# Patient Record
Sex: FEMALE | Race: WHITE | Employment: UNEMPLOYED | URBAN - METROPOLITAN AREA
[De-identification: names, ages, dates, MRNs, and addresses within clinical notes are randomized per-mention and may not be internally consistent; named-entity substitution may affect disease eponyms.]

---

## 2017-03-15 ENCOUNTER — APPOINTMENT (OUTPATIENT)
Dept: GENERAL RADIOLOGY | Age: 82
End: 2017-03-15
Attending: EMERGENCY MEDICINE
Payer: MEDICARE

## 2017-03-15 ENCOUNTER — HOSPITAL ENCOUNTER (EMERGENCY)
Age: 82
Discharge: HOME | End: 2017-03-15
Attending: EMERGENCY MEDICINE | Admitting: EMERGENCY MEDICINE
Payer: MEDICARE

## 2017-03-15 VITALS
OXYGEN SATURATION: 97 % | DIASTOLIC BLOOD PRESSURE: 79 MMHG | HEART RATE: 78 BPM | WEIGHT: 124 LBS | RESPIRATION RATE: 17 BRPM | SYSTOLIC BLOOD PRESSURE: 181 MMHG | TEMPERATURE: 98 F | BODY MASS INDEX: 21.28 KG/M2

## 2017-03-15 DIAGNOSIS — J20.9 ACUTE BRONCHITIS, UNSPECIFIED ORGANISM: Primary | ICD-10-CM

## 2017-03-15 LAB
ALBUMIN SERPL BCP-MCNC: 3.6 G/DL (ref 3.4–5)
ALBUMIN/GLOB SERPL: 1.1 {RATIO} (ref 0.8–1.7)
ALP SERPL-CCNC: 81 U/L (ref 45–117)
ALT SERPL-CCNC: 26 U/L (ref 13–56)
ANION GAP BLD CALC-SCNC: 9 MMOL/L (ref 3–18)
AST SERPL W P-5'-P-CCNC: 23 U/L (ref 15–37)
BASOPHILS # BLD AUTO: 0 K/UL (ref 0–0.06)
BASOPHILS # BLD: 0 % (ref 0–2)
BILIRUB SERPL-MCNC: 0.5 MG/DL (ref 0.2–1)
BNP SERPL-MCNC: 1171 PG/ML (ref 0–1800)
BUN SERPL-MCNC: 30 MG/DL (ref 7–18)
BUN/CREAT SERPL: 25 (ref 12–20)
CALCIUM SERPL-MCNC: 8.8 MG/DL (ref 8.5–10.1)
CHLORIDE SERPL-SCNC: 107 MMOL/L (ref 100–108)
CK MB CFR SERPL CALC: 1.5 % (ref 0–4)
CK MB SERPL-MCNC: 1 NG/ML (ref 5–25)
CK SERPL-CCNC: 66 U/L (ref 26–192)
CO2 SERPL-SCNC: 26 MMOL/L (ref 21–32)
CREAT SERPL-MCNC: 1.22 MG/DL (ref 0.6–1.3)
DIFFERENTIAL METHOD BLD: ABNORMAL
EOSINOPHIL # BLD: 0 K/UL (ref 0–0.4)
EOSINOPHIL NFR BLD: 0 % (ref 0–5)
ERYTHROCYTE [DISTWIDTH] IN BLOOD BY AUTOMATED COUNT: 12.3 % (ref 11.6–14.5)
GLOBULIN SER CALC-MCNC: 3.3 G/DL (ref 2–4)
GLUCOSE SERPL-MCNC: 87 MG/DL (ref 74–99)
HCT VFR BLD AUTO: 34.3 % (ref 35–45)
HGB BLD-MCNC: 11.2 G/DL (ref 12–16)
LACTATE BLD-SCNC: 0.8 MMOL/L (ref 0.4–2)
LYMPHOCYTES # BLD AUTO: 18 % (ref 21–52)
LYMPHOCYTES # BLD: 0.9 K/UL (ref 0.9–3.6)
MCH RBC QN AUTO: 30.2 PG (ref 24–34)
MCHC RBC AUTO-ENTMCNC: 32.7 G/DL (ref 31–37)
MCV RBC AUTO: 92.5 FL (ref 74–97)
MONOCYTES # BLD: 0.3 K/UL (ref 0.05–1.2)
MONOCYTES NFR BLD AUTO: 7 % (ref 3–10)
NEUTS SEG # BLD: 3.5 K/UL (ref 1.8–8)
NEUTS SEG NFR BLD AUTO: 75 % (ref 40–73)
PLATELET # BLD AUTO: 196 K/UL (ref 135–420)
PMV BLD AUTO: 9.9 FL (ref 9.2–11.8)
POTASSIUM SERPL-SCNC: 4.6 MMOL/L (ref 3.5–5.5)
PROT SERPL-MCNC: 6.9 G/DL (ref 6.4–8.2)
RBC # BLD AUTO: 3.71 M/UL (ref 4.2–5.3)
SODIUM SERPL-SCNC: 142 MMOL/L (ref 136–145)
TROPONIN I SERPL-MCNC: <0.02 NG/ML (ref 0–0.04)
WBC # BLD AUTO: 4.8 K/UL (ref 4.6–13.2)

## 2017-03-15 PROCEDURE — 85025 COMPLETE CBC W/AUTO DIFF WBC: CPT | Performed by: EMERGENCY MEDICINE

## 2017-03-15 PROCEDURE — 83605 ASSAY OF LACTIC ACID: CPT

## 2017-03-15 PROCEDURE — 83880 ASSAY OF NATRIURETIC PEPTIDE: CPT | Performed by: EMERGENCY MEDICINE

## 2017-03-15 PROCEDURE — 87040 BLOOD CULTURE FOR BACTERIA: CPT | Performed by: EMERGENCY MEDICINE

## 2017-03-15 PROCEDURE — 93005 ELECTROCARDIOGRAM TRACING: CPT

## 2017-03-15 PROCEDURE — 82550 ASSAY OF CK (CPK): CPT | Performed by: EMERGENCY MEDICINE

## 2017-03-15 PROCEDURE — 80053 COMPREHEN METABOLIC PANEL: CPT | Performed by: EMERGENCY MEDICINE

## 2017-03-15 PROCEDURE — 71010 XR CHEST PORT: CPT

## 2017-03-15 PROCEDURE — 99285 EMERGENCY DEPT VISIT HI MDM: CPT

## 2017-03-15 RX ORDER — LANOLIN ALCOHOL/MO/W.PET/CERES
CREAM (GRAM) TOPICAL
COMMUNITY
End: 2018-09-24

## 2017-03-15 RX ORDER — HYDROCODONE POLISTIREX AND CHLORPHENIRAMINE POLISTIREX 10; 8 MG/5ML; MG/5ML
2.5 SUSPENSION, EXTENDED RELEASE ORAL
Qty: 60 ML | Refills: 0 | Status: SHIPPED | OUTPATIENT
Start: 2017-03-15 | End: 2018-04-09

## 2017-03-15 NOTE — ED TRIAGE NOTES
Pt reports runny nose and drainage to back of throat that \"gives me a tickle and makes me cough\". Pt reports shortness of breath x 3 days and was seen at PCP for same. Pt arrives via EMS from [facility] due to pt being hypertensive at Urgent Care.

## 2017-03-15 NOTE — ED NOTES
I have reviewed discharge instructions and medications with the patient. The patient verbalized understanding. Patient is stable and in good condition with normal vital signs. Patient chose to discharge with their wristband; privacy risks were discussed.  Patient escorted to lobby via wheelchair

## 2017-03-15 NOTE — ED PROVIDER NOTES
HPI Comments: 3:59 PM Andrew is a 80 y.o. female with a history of HTN, CAD, DJD, and dyslipidemia, who presents to the ED c/o congestion. She also c/o rhinorrhea, chest tightness, and SOB. She was sent here via EMS from Urgent Care because she had high blood pressure. Patient denies any tobacco, alcohol, or any illicit drug use. She states that she has been taking robitussin for her sx, but found little relief. She denies any cough, wheezing, fever, syncope, nausea, or vomiting. There are no other concerns at this time. Patient is a 80 y.o. female presenting with shortness of breath and hypertension. The history is provided by the patient. Shortness of Breath   Associated symptoms include rhinorrhea. Pertinent negatives include no fever, no headaches, no sore throat, no cough, no wheezing, no chest pain, no vomiting, no abdominal pain and no rash. Hypertension    Associated symptoms include shortness of breath. Pertinent negatives include no chest pain, no palpitations, no headaches, no dizziness, no nausea and no vomiting. Past Medical History:   Diagnosis Date    Coronary artery disease     Degenerative joint disease     Degenerative spine disease     Dyslipidemia     Hypertension     Hypertension     Osteoarthritis        Past Surgical History:   Procedure Laterality Date    HX HYSTERECTOMY           History reviewed. No pertinent family history. Social History     Social History    Marital status:      Spouse name: N/A    Number of children: N/A    Years of education: N/A     Occupational History    Not on file. Social History Main Topics    Smoking status: Never Smoker    Smokeless tobacco: Not on file    Alcohol use No    Drug use: No    Sexual activity: No     Other Topics Concern    Not on file     Social History Narrative         ALLERGIES: Demerol [meperidine]; Erythromycin;  Nitrofurantoin; Pcn [penicillins]; and Sulfa (sulfonamide antibiotics)    Review of Systems   Constitutional: Negative for chills, fatigue and fever. HENT: Positive for congestion and rhinorrhea. Negative for sore throat. Respiratory: Positive for chest tightness and shortness of breath. Negative for cough and wheezing. Cardiovascular: Negative for chest pain and palpitations. Gastrointestinal: Negative for abdominal pain, diarrhea, nausea and vomiting. Genitourinary: Negative for dysuria, hematuria and urgency. Musculoskeletal: Negative for myalgias. Skin: Negative for rash and wound. Neurological: Negative for dizziness, syncope and headaches. All other systems reviewed and are negative. Vitals:    03/15/17 1537 03/15/17 1615 03/15/17 1630 03/15/17 1645   BP: 177/71 193/79 180/76 177/79   Pulse:  77 79 79   Resp:  14 12 12   Temp: 98 °F (36.7 °C)      SpO2:  96% 97% 98%   Weight: 56.2 kg (124 lb)               Physical Exam   Constitutional: She is oriented to person, place, and time. She appears well-developed and well-nourished. No distress. HENT:   Head: Normocephalic and atraumatic. Mouth/Throat: Oropharynx is clear and moist.   Eyes: Conjunctivae and EOM are normal. Pupils are equal, round, and reactive to light. No scleral icterus. Neck: Normal range of motion. Neck supple. Cardiovascular: Normal rate, regular rhythm and normal heart sounds. No murmur heard. Pulmonary/Chest: Effort normal and breath sounds normal. No respiratory distress. Abdominal: Soft. Bowel sounds are normal. She exhibits no distension. There is no tenderness. Musculoskeletal: She exhibits no edema. Lymphadenopathy:     She has no cervical adenopathy. Neurological: She is alert and oriented to person, place, and time. Coordination normal.   Skin: Skin is warm and dry. No rash noted. Psychiatric: She has a normal mood and affect. Her behavior is normal.   Nursing note and vitals reviewed.        MDM  Number of Diagnoses or Management Options  Acute bronchitis, unspecified organism:   Diagnosis management comments: C/o cough nasal congestion concerned for possible pneumonia     cxr neg in ED labs reviewed will not use abx at present pt requested something for cough will start 2.5ml tussionex        Amount and/or Complexity of Data Reviewed  Clinical lab tests: ordered and reviewed  Tests in the radiology section of CPT®: ordered and reviewed  Independent visualization of images, tracings, or specimens: yes    Risk of Complications, Morbidity, and/or Mortality  Presenting problems: high  Diagnostic procedures: moderate  Management options: moderate      ED Course       Procedures      Lab findings:  Recent Results (from the past 12 hour(s))   EKG, 12 LEAD, INITIAL    Collection Time: 03/15/17  3:41 PM   Result Value Ref Range    Ventricular Rate 81 BPM    Atrial Rate 81 BPM    P-R Interval 240 ms    QRS Duration 92 ms    Q-T Interval 370 ms    QTC Calculation (Bezet) 429 ms    Calculated P Axis 78 degrees    Calculated R Axis 44 degrees    Calculated T Axis 51 degrees    Diagnosis       Sinus rhythm with 1st degree AV block  Otherwise normal ECG  When compared with ECG of 02-AUG-2015 09:50,  ME interval has increased  T wave inversion no longer evident in Inferior leads  T wave inversion no longer evident in Anterolateral leads     CBC WITH AUTOMATED DIFF    Collection Time: 03/15/17  4:20 PM   Result Value Ref Range    WBC 4.8 4.6 - 13.2 K/uL    RBC 3.71 (L) 4.20 - 5.30 M/uL    HGB 11.2 (L) 12.0 - 16.0 g/dL    HCT 34.3 (L) 35.0 - 45.0 %    MCV 92.5 74.0 - 97.0 FL    MCH 30.2 24.0 - 34.0 PG    MCHC 32.7 31.0 - 37.0 g/dL    RDW 12.3 11.6 - 14.5 %    PLATELET 107 756 - 224 K/uL    MPV 9.9 9.2 - 11.8 FL    NEUTROPHILS 75 (H) 40 - 73 %    LYMPHOCYTES 18 (L) 21 - 52 %    MONOCYTES 7 3 - 10 %    EOSINOPHILS 0 0 - 5 %    BASOPHILS 0 0 - 2 %    ABS. NEUTROPHILS 3.5 1.8 - 8.0 K/UL    ABS. LYMPHOCYTES 0.9 0.9 - 3.6 K/UL    ABS. MONOCYTES 0.3 0.05 - 1.2 K/UL    ABS. EOSINOPHILS 0.0 0.0 - 0.4 K/UL    ABS. BASOPHILS 0.0 0.0 - 0.06 K/UL    DF AUTOMATED     METABOLIC PANEL, COMPREHENSIVE    Collection Time: 03/15/17  4:20 PM   Result Value Ref Range    Sodium 142 136 - 145 mmol/L    Potassium 4.6 3.5 - 5.5 mmol/L    Chloride 107 100 - 108 mmol/L    CO2 26 21 - 32 mmol/L    Anion gap 9 3.0 - 18 mmol/L    Glucose 87 74 - 99 mg/dL    BUN 30 (H) 7.0 - 18 MG/DL    Creatinine 1.22 0.6 - 1.3 MG/DL    BUN/Creatinine ratio 25 (H) 12 - 20      GFR est AA 50 (L) >60 ml/min/1.73m2    GFR est non-AA 41 (L) >60 ml/min/1.73m2    Calcium 8.8 8.5 - 10.1 MG/DL    Bilirubin, total 0.5 0.2 - 1.0 MG/DL    ALT (SGPT) 26 13 - 56 U/L    AST (SGOT) 23 15 - 37 U/L    Alk. phosphatase 81 45 - 117 U/L    Protein, total 6.9 6.4 - 8.2 g/dL    Albumin 3.6 3.4 - 5.0 g/dL    Globulin 3.3 2.0 - 4.0 g/dL    A-G Ratio 1.1 0.8 - 1.7     CARDIAC PANEL,(CK, CKMB & TROPONIN)    Collection Time: 03/15/17  4:20 PM   Result Value Ref Range    CK 66 26 - 192 U/L    CK - MB 1.0 <3.6 ng/ml    CK-MB Index 1.5 0.0 - 4.0 %    Troponin-I, Qt. <0.02 0.0 - 0.045 NG/ML   PRO-BNP    Collection Time: 03/15/17  4:20 PM   Result Value Ref Range    NT pro-BNP 1171 0 - 1800 PG/ML   CULTURE, BLOOD    Collection Time: 03/15/17  4:20 PM   Result Value Ref Range    Special Requests: PERIPHERAL      Culture result: PENDING    POC LACTIC ACID    Collection Time: 03/15/17  4:23 PM   Result Value Ref Range    Lactic Acid (POC) 0.8 0.4 - 2.0 mmol/L       X-Ray, CT or other radiology findings or impressions:  XR CHEST PORT   Final Result   Impression:     1.  No acute pulmonary disease         Orders  Orders Placed This Encounter    CULTURE, BLOOD     Standing Status:   Standing     Number of Occurrences:   1    CULTURE, BLOOD     Standing Status:   Standing     Number of Occurrences:   1    XR CHEST PORT     Standing Status:   Standing     Number of Occurrences:   1     Order Specific Question:   Reason for Exam     Answer:   dyspnea    CBC WITH AUTOMATED DIFF     Standing Status:   Standing     Number of Occurrences:   1    METABOLIC PANEL, COMPREHENSIVE     Standing Status:   Standing     Number of Occurrences:   1    CARDIAC PANEL,(CK, CKMB & TROPONIN)     Standing Status:   Standing     Number of Occurrences:   1    PRO-BNP     Standing Status:   Standing     Number of Occurrences:   1    POC LACTIC ACID     Standing Status:   Standing     Number of Occurrences:   1    POC LACTIC ACID     Standing Status:   Standing     Number of Occurrences:   1    EKG, 12 LEAD, INITIAL     Standing Status:   Standing     Number of Occurrences:   1     Order Specific Question:   Reason for Exam:     Answer:   dyspnea    ferrous sulfate (IRON) 325 mg (65 mg iron) tablet     Sig: Take  by mouth Daily (before breakfast).  chlorpheniramine-HYDROcodone (TUSSIONEX PENNKINETIC ER) 10-8 mg/5 mL suspension     Sig: Take 2.5 mL by mouth every twelve (12) hours as needed for Cough. Max Daily Amount: 5 mL. Dispense:  60 mL     Refill:  0         Re-evaluation:  4:57 PM I have assessed the patient, who will be discharged in stable condition. I've given the patient precautions to return to the emergency room if there are any new or worsening conditions. I've also instructed the patient to follow up regarding their visit today. Patient has verbalized understanding and agreement with treatment plan, plan to discharge, and follow-up. All questions were answered at this time. Disposition:  Diagnosis:   1.  Acute bronchitis, unspecified organism        Disposition: Discharge    Follow-up Information     Follow up With Details Comments Contact [facility] EMERGENCY DEPT  As needed, If symptoms worsen [address] [place] [address], MD Schedule an appointment as soon as possible for a visit for ED follow up appointment  [address] Liam Mendez  [facility] Internal Medicine [address] [place] [address]  [phone]             Patient's Medications   Start Taking    CHLORPHENIRAM[facility]OCODONE [facility]) 10-8 MG/5 ML SUSPENSION    Take 2.5 mL by mouth every twelve (12) hours as needed for Cough. Max Daily Amount: 5 mL. Continue Taking    ACETAMINOPHEN (TYLENOL) 325 MG TABLET    Take 2 Tabs by mouth every four (4) hours as needed. ASPIRIN DELAYED-RELEASE 81 MG TABLET    Take 1 Tab by mouth daily. ATORVASTATIN (LIPITOR) 40 MG TABLET    Take 1 Tab by mouth nightly. DICLOFENAC POTASSIUM (CATAFLAM) 50 MG TABLET    Take 50 mg by mouth two (2) times a day. ERGOCALCIFEROL (VITAMIN D2) 50,000 UNIT CAPSULE    Take 50,000 Units by mouth. FERROUS SULFATE (IRON) 325 MG (65 MG IRON) TABLET    Take  by mouth Daily (before breakfast). HYDROCODONE-ACETAMINOPHEN (NORCO) 5-325 MG PER TABLET    Take 1-2 tablets PO every 4-6 hours as needed for pain control. If over the counter ibuprofen or acetaminophen was suggested, then only take the vicodin for pain not well controlled with the over the counter medication. METOPROLOL (LOPRESSOR) 25 MG TABLET    Take 1 Tab by mouth every twelve (12) hours. Indications: HYPERTENSION    NITROGLYCERIN (NITROSTAT) 0.4 MG SL TABLET    1 Tab by SubLINGual route every five (5) minutes as needed for Chest Pain. PANTOPRAZOLE (PROTONIX) 40 MG TABLET    Take 40 mg by mouth daily. Indications: GASTROESOPHAGEAL REFLUX    POLYETHYLENE GLYCOL (MIRALAX) 17 GRAM PACKET    Take 1 Packet by mouth daily. TICAGRELOR (BRILINTA) 90 MG TABLET    Take 1 Tab by mouth two (2) times a day. TRAMADOL (ULTRAM) 50 MG TABLET    Take 50 mg by mouth every six (6) hours as needed for Pain. VALSARTAN (DIOVAN) 320 MG TABLET    Take 0.5 Tabs by mouth daily. Indications: HYPERTENSION   These Medications have changed    No medications on file   Stop Taking    No medications on file         Scribe Attestation:   Pablo Rocha acting as a scribe for and in the presence of Dr. Mireya Nolasco MD     Signed by:  Pablo Rocha, Skip, March 15, 2017 at 4:57 PM     Provider Attestation:   I personally performed the services described in the documentation, reviewed the documentation, as recorded by the scribe in my presence, and it accurately and completely records my words and actions.      Reviewed and signed by:  Dr. Elaine Munoz MD

## 2017-03-15 NOTE — DISCHARGE INSTRUCTIONS
Bronchitis: Care Instructions  Your Care Instructions    Bronchitis is inflammation of the bronchial tubes, which carry air to the lungs. The tubes swell and produce mucus, or phlegm. The mucus and inflamed bronchial tubes make you cough. You may have trouble breathing. Most cases of bronchitis are caused by viruses like those that cause colds. Antibiotics usually do not help and they may be harmful. Bronchitis usually develops rapidly and lasts about 2 to 3 weeks in otherwise healthy people. Follow-up care is a key part of your treatment and safety. Be sure to make and go to all appointments, and call your doctor if you are having problems. It's also a good idea to know your test results and keep a list of the medicines you take. How can you care for yourself at home? · Take all medicines exactly as prescribed. Call your doctor if you think you are having a problem with your medicine. · Get some extra rest.  · Take an over-the-counter pain medicine, such as acetaminophen (Tylenol), ibuprofen (Advil, Motrin), or naproxen (Aleve) to reduce fever and relieve body aches. Read and follow all instructions on the label. · Do not take two or more pain medicines at the same time unless the doctor told you to. Many pain medicines have acetaminophen, which is Tylenol. Too much acetaminophen (Tylenol) can be harmful. · Take an over-the-counter cough medicine that contains dextromethorphan to help quiet a dry, hacking cough so that you can sleep. Avoid cough medicines that have more than one active ingredient. Read and follow all instructions on the label. · Breathe moist air from a humidifier, hot shower, or sink filled with hot water. The heat and moisture will thin mucus so you can cough it out. · Do not smoke. Smoking can make bronchitis worse. If you need help quitting, talk to your doctor about stop-smoking programs and medicines. These can increase your chances of quitting for good.   When should you call for help? Call 911 anytime you think you may need emergency care. For example, call if:  · You have severe trouble breathing. Call your doctor now or seek immediate medical care if:  · You have new or worse trouble breathing. · You cough up dark brown or bloody mucus (sputum). · You have a new or higher fever. · You have a new rash. Watch closely for changes in your health, and be sure to contact your doctor if:  · You cough more deeply or more often, especially if you notice more mucus or a change in the color of your mucus. · You are not getting better as expected. Where can you learn more? Go to http://christ-antwon.info/. Enter H333 in the search box to learn more about \"Bronchitis: Care Instructions. \"  Current as of: May 23, 2016  Content Version: 11.1  © 7665-4557 [employer]. Care instructions adapted under license by [employer] (which disclaims liability or warranty for this information). If you have questions about a medical condition or this instruction, always ask your healthcare professional. [address] any warranty or liability for your use of this information.

## 2017-03-15 NOTE — ED NOTES
Pt states she is not currently feeling short of breath. States \"I think I might have the flu.  I'm a bit congested\"

## 2017-03-16 LAB
ATRIAL RATE: 81 BPM
CALCULATED P AXIS, ECG09: 78 DEGREES
CALCULATED R AXIS, ECG10: 44 DEGREES
CALCULATED T AXIS, ECG11: 51 DEGREES
DIAGNOSIS, 93000: NORMAL
P-R INTERVAL, ECG05: 240 MS
Q-T INTERVAL, ECG07: 370 MS
QRS DURATION, ECG06: 92 MS
QTC CALCULATION (BEZET), ECG08: 429 MS
VENTRICULAR RATE, ECG03: 81 BPM

## 2017-03-21 LAB
BACTERIA SPEC CULT: NORMAL
BACTERIA SPEC CULT: NORMAL
SERVICE CMNT-IMP: NORMAL
SERVICE CMNT-IMP: NORMAL

## 2017-12-18 ENCOUNTER — HOSPITAL ENCOUNTER (EMERGENCY)
Age: 82
Discharge: HOME | End: 2017-12-18
Attending: EMERGENCY MEDICINE
Payer: MEDICARE

## 2017-12-18 ENCOUNTER — APPOINTMENT (OUTPATIENT)
Dept: CT IMAGING | Age: 82
End: 2017-12-18
Attending: EMERGENCY MEDICINE
Payer: MEDICARE

## 2017-12-18 ENCOUNTER — APPOINTMENT (OUTPATIENT)
Dept: GENERAL RADIOLOGY | Age: 82
End: 2017-12-18
Attending: EMERGENCY MEDICINE
Payer: MEDICARE

## 2017-12-18 VITALS
OXYGEN SATURATION: 99 % | HEART RATE: 91 BPM | DIASTOLIC BLOOD PRESSURE: 71 MMHG | RESPIRATION RATE: 16 BRPM | TEMPERATURE: 98.6 F | SYSTOLIC BLOOD PRESSURE: 168 MMHG

## 2017-12-18 DIAGNOSIS — R07.89 ATYPICAL CHEST PAIN: Primary | ICD-10-CM

## 2017-12-18 LAB
ALBUMIN SERPL-MCNC: 3.2 G/DL (ref 3.4–5)
ALBUMIN/GLOB SERPL: 1.1 {RATIO} (ref 0.8–1.7)
ALP SERPL-CCNC: 79 U/L (ref 45–117)
ALT SERPL-CCNC: 20 U/L (ref 13–56)
ANION GAP SERPL CALC-SCNC: 9 MMOL/L (ref 3–18)
AST SERPL-CCNC: 21 U/L (ref 15–37)
BASOPHILS # BLD: 0 K/UL (ref 0–0.06)
BASOPHILS NFR BLD: 0 % (ref 0–2)
BILIRUB SERPL-MCNC: 1 MG/DL (ref 0.2–1)
BUN SERPL-MCNC: 20 MG/DL (ref 7–18)
BUN/CREAT SERPL: 21 (ref 12–20)
CALCIUM SERPL-MCNC: 8.5 MG/DL (ref 8.5–10.1)
CHLORIDE SERPL-SCNC: 105 MMOL/L (ref 100–108)
CK MB CFR SERPL CALC: NORMAL % (ref 0–4)
CK MB SERPL-MCNC: <1 NG/ML (ref 5–25)
CK SERPL-CCNC: 46 U/L (ref 26–192)
CO2 SERPL-SCNC: 27 MMOL/L (ref 21–32)
CREAT SERPL-MCNC: 0.97 MG/DL (ref 0.6–1.3)
DIFFERENTIAL METHOD BLD: ABNORMAL
EOSINOPHIL # BLD: 0 K/UL (ref 0–0.4)
EOSINOPHIL NFR BLD: 1 % (ref 0–5)
ERYTHROCYTE [DISTWIDTH] IN BLOOD BY AUTOMATED COUNT: 12.6 % (ref 11.6–14.5)
GLOBULIN SER CALC-MCNC: 3 G/DL (ref 2–4)
GLUCOSE SERPL-MCNC: 100 MG/DL (ref 74–99)
HCT VFR BLD AUTO: 31 % (ref 35–45)
HGB BLD-MCNC: 10.1 G/DL (ref 12–16)
LYMPHOCYTES # BLD: 0.7 K/UL (ref 0.9–3.6)
LYMPHOCYTES NFR BLD: 17 % (ref 21–52)
MCH RBC QN AUTO: 30.1 PG (ref 24–34)
MCHC RBC AUTO-ENTMCNC: 32.6 G/DL (ref 31–37)
MCV RBC AUTO: 92.5 FL (ref 74–97)
MONOCYTES # BLD: 0.3 K/UL (ref 0.05–1.2)
MONOCYTES NFR BLD: 7 % (ref 3–10)
NEUTS SEG # BLD: 3.2 K/UL (ref 1.8–8)
NEUTS SEG NFR BLD: 75 % (ref 40–73)
PLATELET # BLD AUTO: 176 K/UL (ref 135–420)
PMV BLD AUTO: 9.9 FL (ref 9.2–11.8)
POTASSIUM SERPL-SCNC: 4 MMOL/L (ref 3.5–5.5)
PROT SERPL-MCNC: 6.2 G/DL (ref 6.4–8.2)
RBC # BLD AUTO: 3.35 M/UL (ref 4.2–5.3)
SODIUM SERPL-SCNC: 141 MMOL/L (ref 136–145)
TROPONIN I SERPL-MCNC: 0.02 NG/ML (ref 0–0.04)
TROPONIN I SERPL-MCNC: <0.02 NG/ML (ref 0–0.04)
WBC # BLD AUTO: 4.3 K/UL (ref 4.6–13.2)

## 2017-12-18 PROCEDURE — 70450 CT HEAD/BRAIN W/O DYE: CPT

## 2017-12-18 PROCEDURE — 82550 ASSAY OF CK (CPK): CPT | Performed by: EMERGENCY MEDICINE

## 2017-12-18 PROCEDURE — 99285 EMERGENCY DEPT VISIT HI MDM: CPT

## 2017-12-18 PROCEDURE — 80053 COMPREHEN METABOLIC PANEL: CPT | Performed by: EMERGENCY MEDICINE

## 2017-12-18 PROCEDURE — 71010 XR CHEST PORT: CPT

## 2017-12-18 PROCEDURE — 85025 COMPLETE CBC W/AUTO DIFF WBC: CPT | Performed by: EMERGENCY MEDICINE

## 2017-12-18 PROCEDURE — 93005 ELECTROCARDIOGRAM TRACING: CPT

## 2017-12-18 NOTE — ED TRIAGE NOTES
Per EMS-\"Patient complains of jaw pain and face pain and states this is how she felt when she had an MI previously.  We medicated her with 324 mf baby aspirin and she took 2 SL NTG at home prior to our arrival.\"

## 2017-12-18 NOTE — DISCHARGE INSTRUCTIONS
Chest Pain: Care Instructions  Your Care Instructions    There are many things that can cause chest pain. Some are not serious and will get better on their own in a few days. But some kinds of chest pain need more testing and treatment. Your doctor may have recommended a follow-up visit in the next 8 to 12 hours. If you are not getting better, you may need more tests or treatment. Even though your doctor has released you, you still need to watch for any problems. The doctor carefully checked you, but sometimes problems can develop later. If you have new symptoms or if your symptoms do not get better, get medical care right away. If you have worse or different chest pain or pressure that lasts more than 5 minutes or you passed out (lost consciousness), call 911 or seek other emergency help right away. A medical visit is only one step in your treatment. Even if you feel better, you still need to do what your doctor recommends, such as going to all suggested follow-up appointments and taking medicines exactly as directed. This will help you recover and help prevent future problems. How can you care for yourself at home? · Rest until you feel better. · Take your medicine exactly as prescribed. Call your doctor if you think you are having a problem with your medicine. · Do not drive after taking a prescription pain medicine. When should you call for help? Call 911 if:  ? · You passed out (lost consciousness). ? · You have severe difficulty breathing. ? · You have symptoms of a heart attack. These may include:  ¨ Chest pain or pressure, or a strange feeling in your chest.  ¨ Sweating. ¨ Shortness of breath. ¨ Nausea or vomiting. ¨ Pain, pressure, or a strange feeling in your back, neck, jaw, or upper belly or in one or both shoulders or arms. ¨ Lightheadedness or sudden weakness. ¨ A fast or irregular heartbeat.   After you call 911, the  may tell you to chew 1 adult-strength or 2 to 4 low-dose aspirin. Wait for an ambulance. Do not try to drive yourself. ?Call your doctor today if:  ? · You have any trouble breathing. ? · Your chest pain gets worse. ? · You are dizzy or lightheaded, or you feel like you may faint. ? · You are not getting better as expected. ? · You are having new or different chest pain. Where can you learn more? Go to http://christ-antwon.info/. Enter A120 in the search box to learn more about \"Chest Pain: Care Instructions. \"  Current as of: March 20, 2017  Content Version: 11.4  © 4229-1401 [employer]. Care instructions adapted under license by [employer] (which disclaims liability or warranty for this information). If you have questions about a medical condition or this instruction, always ask your healthcare professional. [address] any warranty or liability for your use of this information.

## 2017-12-18 NOTE — ED PROVIDER NOTES
EMERGENCY DEPARTMENT HISTORY AND PHYSICAL EXAM    11:28 AM      Date: 12/18/2017  Patient Name: Neo Arellano    History of Presenting Illness     Chief Complaint   Patient presents with    Chest Pain         History Provided By: Patient    Chief Complaint: Facial discomfort  Duration: few Hours PTA  Timing:  Acute and Improving  Location: N/A  Quality: discomfort, tingling  Severity: N/A  Modifying Factors: N/A  Associated Symptoms: weakness, near syncope, facial discomfort, dizziness      Additional History (Context): Neo Arellano is a 80 y.o. female with hypertension and CAD who presents with  dizziness occurring few hours PTA. Associated sx include weakness, near syncope, discomfort in face. Pt took 2 NTG and 81mg ASA as her facial discomfort felt similar to prior MI circumstance. EMS adminsitered 324 mg ASA as well. Pt denies diaphoresis. Hx MI on 05/2015. Pt does not use etOH or tobacco.    PCP: Author Rachid MD    Current Outpatient Prescriptions   Medication Sig Dispense Refill    ferrous sulfate (IRON) 325 mg (65 mg iron) tablet Take  by mouth Daily (before breakfast).  chlorpheniramine-HYDROcodone (TUSSIONEX PENNKINETIC ER) 10-8 mg/5 mL suspension Take 2.5 mL by mouth every twelve (12) hours as needed for Cough. Max Daily Amount: 5 mL. 60 mL 0    traMADol (ULTRAM) 50 mg tablet Take 50 mg by mouth every six (6) hours as needed for Pain.  diclofenac potassium (CATAFLAM) 50 mg tablet Take 50 mg by mouth two (2) times a day.  HYDROcodone-acetaminophen (NORCO) 5-325 mg per tablet Take 1-2 tablets PO every 4-6 hours as needed for pain control. If over the counter ibuprofen or acetaminophen was suggested, then only take the vicodin for pain not well controlled with the over the counter medication. 6 Tab 0    metoprolol (LOPRESSOR) 25 mg tablet Take 1 Tab by mouth every twelve (12) hours. Indications: HYPERTENSION (Patient taking differently: Take 50 mg by mouth every twelve (12) hours. Indications: hypertension) 60 Tab 2    atorvastatin (LIPITOR) 40 mg tablet Take 1 Tab by mouth nightly. 30 Tab 0    ticagrelor (BRILINTA) 90 mg tablet Take 1 Tab by mouth two (2) times a day. 60 Tab 2    valsartan (DIOVAN) 320 mg tablet Take 0.5 Tabs by mouth daily. Indications: HYPERTENSION 30 Tab 0    acetaminophen (TYLENOL) 325 mg tablet Take 2 Tabs by mouth every four (4) hours as needed. 60 Tab 0    aspirin delayed-release 81 mg tablet Take 1 Tab by mouth daily. 30 Tab 0    nitroglycerin (NITROSTAT) 0.4 mg SL tablet 1 Tab by SubLINGual route every five (5) minutes as needed for Chest Pain. 30 Tab 0    polyethylene glycol (MIRALAX) 17 gram packet Take 1 Packet by mouth daily. 30 Packet 0    pantoprazole (PROTONIX) 40 mg tablet Take 40 mg by mouth daily. Indications: GASTROESOPHAGEAL REFLUX      ergocalciferol (VITAMIN D2) 50,000 unit capsule Take 50,000 Units by mouth. Past History     Past Medical History:  Past Medical History:   Diagnosis Date    Coronary artery disease     Degenerative joint disease     Degenerative spine disease     Dyslipidemia     Hypertension     Hypertension     Osteoarthritis        Past Surgical History:  Past Surgical History:   Procedure Laterality Date    HX HYSTERECTOMY         Family History:  History reviewed. No pertinent family history. Social History:  Social History   Substance Use Topics    Smoking status: Never Smoker    Smokeless tobacco: None    Alcohol use No       Allergies: Allergies   Allergen Reactions    Demerol [Meperidine] Other (comments)     Oral pain     Erythromycin Other (comments)    Nitrofurantoin Other (comments)     Mouth pain     Pcn [Penicillins] Other (comments)     Oral pain     Sulfa (Sulfonamide Antibiotics) Other (comments)     Oral pain          Review of Systems       Review of Systems   Constitutional: Positive for appetite change. Negative for diaphoresis and fever.    HENT:        Positive for facial discomfort   Neurological: Positive for dizziness, weakness and light-headedness (near syncope feeling). All other systems reviewed and are negative. Physical Exam     Visit Vitals    /73    Pulse 92    Temp 98.6 °F (37 °C)    Resp 14    SpO2 98%         Physical Exam   Constitutional: She is oriented to person, place, and time. She appears well-developed and well-nourished. No distress. HENT:   Head: Normocephalic and atraumatic. Mouth/Throat: Oropharynx is clear and moist.   Eyes: Conjunctivae and EOM are normal. Pupils are equal, round, and reactive to light. No scleral icterus. Neck: Normal range of motion. Neck supple. Cardiovascular: Normal rate, regular rhythm and normal heart sounds. No murmur heard. Pulmonary/Chest: Effort normal and breath sounds normal. No respiratory distress. Abdominal: Soft. Bowel sounds are normal. She exhibits no distension. There is no tenderness. Musculoskeletal: She exhibits no edema. Lymphadenopathy:     She has no cervical adenopathy. Neurological: She is alert and oriented to person, place, and time. Coordination normal.   Skin: Skin is warm and dry. No rash noted. Psychiatric: She has a normal mood and affect. Her behavior is normal.   Nursing note and vitals reviewed.         Diagnostic Study Results     Labs -  Recent Results (from the past 12 hour(s))   EKG, 12 LEAD, INITIAL    Collection Time: 12/18/17 11:18 AM   Result Value Ref Range    Ventricular Rate 90 BPM    Atrial Rate 90 BPM    P-R Interval 224 ms    QRS Duration 90 ms    Q-T Interval 358 ms    QTC Calculation (Bezet) 437 ms    Calculated P Axis 81 degrees    Calculated R Axis 85 degrees    Calculated T Axis -2 degrees    Diagnosis       Sinus rhythm with 1st degree AV block  Abnormal QRS-T angle, consider primary T wave abnormality  Abnormal ECG  When compared with ECG of 15-MAR-2017 15:41,  T wave inversion now evident in Inferior leads     CARDIAC PANEL,(CK, CKMB & TROPONIN)    Collection Time: 12/18/17 11:30 AM   Result Value Ref Range    CK 46 26 - 192 U/L    CK - MB <1.0 <3.6 ng/ml    CK-MB Index  0.0 - 4.0 %     CALCULATION NOT PERFORMED WHEN RESULT IS BELOW LINEAR LIMIT    Troponin-I, Qt. <0.02 0.0 - 0.045 NG/ML   CBC WITH AUTOMATED DIFF    Collection Time: 12/18/17 11:30 AM   Result Value Ref Range    WBC 4.3 (L) 4.6 - 13.2 K/uL    RBC 3.35 (L) 4.20 - 5.30 M/uL    HGB 10.1 (L) 12.0 - 16.0 g/dL    HCT 31.0 (L) 35.0 - 45.0 %    MCV 92.5 74.0 - 97.0 FL    MCH 30.1 24.0 - 34.0 PG    MCHC 32.6 31.0 - 37.0 g/dL    RDW 12.6 11.6 - 14.5 %    PLATELET 715 575 - 405 K/uL    MPV 9.9 9.2 - 11.8 FL    NEUTROPHILS 75 (H) 40 - 73 %    LYMPHOCYTES 17 (L) 21 - 52 %    MONOCYTES 7 3 - 10 %    EOSINOPHILS 1 0 - 5 %    BASOPHILS 0 0 - 2 %    ABS. NEUTROPHILS 3.2 1.8 - 8.0 K/UL    ABS. LYMPHOCYTES 0.7 (L) 0.9 - 3.6 K/UL    ABS. MONOCYTES 0.3 0.05 - 1.2 K/UL    ABS. EOSINOPHILS 0.0 0.0 - 0.4 K/UL    ABS. BASOPHILS 0.0 0.0 - 0.06 K/UL    DF AUTOMATED     METABOLIC PANEL, COMPREHENSIVE    Collection Time: 12/18/17 11:30 AM   Result Value Ref Range    Sodium 141 136 - 145 mmol/L    Potassium 4.0 3.5 - 5.5 mmol/L    Chloride 105 100 - 108 mmol/L    CO2 27 21 - 32 mmol/L    Anion gap 9 3.0 - 18 mmol/L    Glucose 100 (H) 74 - 99 mg/dL    BUN 20 (H) 7.0 - 18 MG/DL    Creatinine 0.97 0.6 - 1.3 MG/DL    BUN/Creatinine ratio 21 (H) 12 - 20      GFR est AA >60 >60 ml/min/1.73m2    GFR est non-AA 53 (L) >60 ml/min/1.73m2    Calcium 8.5 8.5 - 10.1 MG/DL    Bilirubin, total 1.0 0.2 - 1.0 MG/DL    ALT (SGPT) 20 13 - 56 U/L    AST (SGOT) 21 15 - 37 U/L    Alk.  phosphatase 79 45 - 117 U/L    Protein, total 6.2 (L) 6.4 - 8.2 g/dL    Albumin 3.2 (L) 3.4 - 5.0 g/dL    Globulin 3.0 2.0 - 4.0 g/dL    A-G Ratio 1.1 0.8 - 1.7     TROPONIN I    Collection Time: 12/18/17  3:45 PM   Result Value Ref Range    Troponin-I, Qt. 0.02 0.0 - 0.045 NG/ML       Radiologic Studies -   XR CHEST PORT   Final Result   As read by RAD:    Impression:  No acute radiographic cardiopulmonary abnormality. Mild, chronic appearing lower  lung interstitial opacities without substantial change from prior exams     CT HEAD WO CONT   Final Result   As read by RAD:    IMPRESSION:     1. No acute intracranial abnormalities are identified. No CT evidence to  suggest acute intracranial hematoma, cortical infarct, or mass effect/mass  lesion. CT is known to be relatively insensitive to acute ischemia in the first  24-48 hours and MRI may be useful if clinically indicated. 2.  Mild cerebral atrophy/volume loss and periventricular white matter changes,  most commonly seen with chronic microvascular disease           Medical Decision Making   I am the first provider for this patient. I reviewed the vital signs, available nursing notes, past medical history, past surgical history, family history and social history. Vital Signs-Reviewed the patient's vital signs. Pulse Oximetry Analysis -  99% on room air (Interpretation) normal    Cardiac Monitor:  Rate: 89    EKG: Interpreted by the EP. Time Interpreted: 1120   Rate: 89   Rhythm: Normal Sinus Rhythm 1st degree block    Interpretation: nonspecific changes   Comparison: h/o similar in past     Records Reviewed: Nursing Notes (Time of Review: 11:28 AM)    ED Course: Progress Notes, Reevaluation, and Consults:      11:20 Consult:  Discussed care with Dr. Blade Hodges (Primary Care). Standard discussion; including history of patients chief complaint, available diagnostic results, and treatment course. Agrees with 2 sets cardiac enzymes, DC if neg. Provider Notes (Medical Decision Making): atypical presentation for chest pain with face discomfort similar to previous symptoms in past with MI early this am now resolved 2 sets of neg cardiac enzymes         Diagnosis     Clinical Impression:   1.  Atypical chest pain        Disposition: home     Follow-up Information     Follow up With Details Comments [facility] EMERGENCY DEPT  As needed, If symptoms worsen [address] [place] [address] [address]    Vicente Lee MD Schedule an appointment as soon as possible for a visit for ED follow up appointment  [facility] [address] [facility] [address] [phone]             Patient's Medications   Start Taking    No medications on file   Continue Taking    ACETAMINOPHEN (TYLENOL) 325 MG TABLET    Take 2 Tabs by mouth every four (4) hours as needed. ASPIRIN DELAYED-RELEASE 81 MG TABLET    Take 1 Tab by mouth daily. ATORVASTATIN (LIPITOR) 40 MG TABLET    Take 1 Tab by mouth nightly. CHLORPHENIRAMINE-HYDROCODONE (TUSSIONEX PENNKINETIC ER) 10-8 MG/5 ML SUSPENSION    Take 2.5 mL by mouth every twelve (12) hours as needed for Cough. Max Daily Amount: 5 mL. DICLOFENAC POTASSIUM (CATAFLAM) 50 MG TABLET    Take 50 mg by mouth two (2) times a day. ERGOCALCIFEROL (VITAMIN D2) 50,000 UNIT CAPSULE    Take 50,000 Units by mouth. FERROUS SULFATE (IRON) 325 MG (65 MG IRON) TABLET    Take  by mouth Daily (before breakfast). HYDROCODONE-ACETAMINOPHEN (NORCO) 5-325 MG PER TABLET    Take 1-2 tablets PO every 4-6 hours as needed for pain control. If over the counter ibuprofen or acetaminophen was suggested, then only take the vicodin for pain not well controlled with the over the counter medication. METOPROLOL (LOPRESSOR) 25 MG TABLET    Take 1 Tab by mouth every twelve (12) hours. Indications: HYPERTENSION    NITROGLYCERIN (NITROSTAT) 0.4 MG SL TABLET    1 Tab by SubLINGual route every five (5) minutes as needed for Chest Pain. PANTOPRAZOLE (PROTONIX) 40 MG TABLET    Take 40 mg by mouth daily. Indications: GASTROESOPHAGEAL REFLUX    POLYETHYLENE GLYCOL (MIRALAX) 17 GRAM PACKET    Take 1 Packet by mouth daily. TICAGRELOR (BRILINTA) 90 MG TABLET    Take 1 Tab by mouth two (2) times a day.     TRAMADOL (ULTRAM) 50 MG TABLET    Take 50 mg by mouth every six (6) hours as needed for Pain. VALSARTAN (DIOVAN) 320 MG TABLET    Take 0.5 Tabs by mouth daily. Indications: HYPERTENSION   These Medications have changed    No medications on file   Stop Taking    No medications on file     _______________________________    Attestations:  [address] [place] [address] acting as a scribe for and in the presence of Addie Arias MD      December 18, 2017 at 4:32 PM       Provider Attestation:      I personally performed the services described in the documentation, reviewed the documentation, as recorded by the scribe in my presence, and it accurately and completely records my words and actions.  December 18, 2017 at 4:32 PM - Addie Arias MD    _______________________________

## 2017-12-19 LAB
ATRIAL RATE: 89 BPM
CALCULATED P AXIS, ECG09: 73 DEGREES
CALCULATED R AXIS, ECG10: 83 DEGREES
CALCULATED T AXIS, ECG11: 0 DEGREES
DIAGNOSIS, 93000: NORMAL
P-R INTERVAL, ECG05: 220 MS
Q-T INTERVAL, ECG07: 354 MS
QRS DURATION, ECG06: 90 MS
QTC CALCULATION (BEZET), ECG08: 430 MS
VENTRICULAR RATE, ECG03: 89 BPM

## 2018-04-09 ENCOUNTER — HOSPITAL ENCOUNTER (EMERGENCY)
Age: 83
Discharge: HOME | End: 2018-04-09
Attending: EMERGENCY MEDICINE
Payer: MEDICARE

## 2018-04-09 ENCOUNTER — APPOINTMENT (OUTPATIENT)
Dept: CT IMAGING | Age: 83
End: 2018-04-09
Attending: EMERGENCY MEDICINE
Payer: MEDICARE

## 2018-04-09 VITALS
RESPIRATION RATE: 22 BRPM | TEMPERATURE: 98.1 F | HEIGHT: 62 IN | SYSTOLIC BLOOD PRESSURE: 224 MMHG | WEIGHT: 125 LBS | HEART RATE: 85 BPM | DIASTOLIC BLOOD PRESSURE: 80 MMHG | BODY MASS INDEX: 23 KG/M2 | OXYGEN SATURATION: 97 %

## 2018-04-09 DIAGNOSIS — N20.0 RIGHT NEPHROLITHIASIS: ICD-10-CM

## 2018-04-09 DIAGNOSIS — R10.9 ACUTE RIGHT FLANK PAIN: Primary | ICD-10-CM

## 2018-04-09 LAB
ALBUMIN SERPL-MCNC: 3.7 G/DL (ref 3.4–5)
ALBUMIN/GLOB SERPL: 1.1 {RATIO} (ref 0.8–1.7)
ALP SERPL-CCNC: 91 U/L (ref 45–117)
ALT SERPL-CCNC: 22 U/L (ref 13–56)
ANION GAP SERPL CALC-SCNC: 6 MMOL/L (ref 3–18)
APPEARANCE UR: CLEAR
AST SERPL-CCNC: 23 U/L (ref 15–37)
BACTERIA URNS QL MICRO: NEGATIVE /HPF
BASOPHILS # BLD: 0 K/UL (ref 0–0.06)
BASOPHILS NFR BLD: 1 % (ref 0–2)
BILIRUB SERPL-MCNC: 0.6 MG/DL (ref 0.2–1)
BILIRUB UR QL: NEGATIVE
BUN SERPL-MCNC: 27 MG/DL (ref 7–18)
BUN/CREAT SERPL: 23 (ref 12–20)
CALCIUM SERPL-MCNC: 8.7 MG/DL (ref 8.5–10.1)
CHLORIDE SERPL-SCNC: 107 MMOL/L (ref 100–108)
CO2 SERPL-SCNC: 28 MMOL/L (ref 21–32)
COLOR UR: YELLOW
CREAT SERPL-MCNC: 1.16 MG/DL (ref 0.6–1.3)
DIFFERENTIAL METHOD BLD: ABNORMAL
EOSINOPHIL # BLD: 0.1 K/UL (ref 0–0.4)
EOSINOPHIL NFR BLD: 2 % (ref 0–5)
EPITH CASTS URNS QL MICRO: NORMAL /LPF (ref 0–5)
ERYTHROCYTE [DISTWIDTH] IN BLOOD BY AUTOMATED COUNT: 12.5 % (ref 11.6–14.5)
GLOBULIN SER CALC-MCNC: 3.3 G/DL (ref 2–4)
GLUCOSE SERPL-MCNC: 91 MG/DL (ref 74–99)
GLUCOSE UR STRIP.AUTO-MCNC: NEGATIVE MG/DL
HCT VFR BLD AUTO: 33 % (ref 35–45)
HGB BLD-MCNC: 10.8 G/DL (ref 12–16)
HGB UR QL STRIP: NEGATIVE
KETONES UR QL STRIP.AUTO: NEGATIVE MG/DL
LEUKOCYTE ESTERASE UR QL STRIP.AUTO: ABNORMAL
LYMPHOCYTES # BLD: 1.2 K/UL (ref 0.9–3.6)
LYMPHOCYTES NFR BLD: 20 % (ref 21–52)
MCH RBC QN AUTO: 29.9 PG (ref 24–34)
MCHC RBC AUTO-ENTMCNC: 32.7 G/DL (ref 31–37)
MCV RBC AUTO: 91.4 FL (ref 74–97)
MONOCYTES # BLD: 0.5 K/UL (ref 0.05–1.2)
MONOCYTES NFR BLD: 9 % (ref 3–10)
NEUTS SEG # BLD: 4.1 K/UL (ref 1.8–8)
NEUTS SEG NFR BLD: 68 % (ref 40–73)
NITRITE UR QL STRIP.AUTO: NEGATIVE
PH UR STRIP: 5 [PH] (ref 5–8)
PLATELET # BLD AUTO: 206 K/UL (ref 135–420)
PMV BLD AUTO: 9.7 FL (ref 9.2–11.8)
POTASSIUM SERPL-SCNC: 4.4 MMOL/L (ref 3.5–5.5)
PROT SERPL-MCNC: 7 G/DL (ref 6.4–8.2)
PROT UR STRIP-MCNC: NEGATIVE MG/DL
RBC # BLD AUTO: 3.61 M/UL (ref 4.2–5.3)
RBC #/AREA URNS HPF: 0 /HPF (ref 0–5)
SODIUM SERPL-SCNC: 141 MMOL/L (ref 136–145)
SP GR UR REFRACTOMETRY: 1.01 (ref 1–1.03)
UROBILINOGEN UR QL STRIP.AUTO: 0.2 EU/DL (ref 0.2–1)
WBC # BLD AUTO: 6 K/UL (ref 4.6–13.2)
WBC URNS QL MICRO: NORMAL /HPF (ref 0–4)

## 2018-04-09 PROCEDURE — 74011250637 HC RX REV CODE- 250/637: Performed by: EMERGENCY MEDICINE

## 2018-04-09 PROCEDURE — 74011250636 HC RX REV CODE- 250/636: Performed by: EMERGENCY MEDICINE

## 2018-04-09 PROCEDURE — 74011250636 HC RX REV CODE- 250/636

## 2018-04-09 PROCEDURE — 85025 COMPLETE CBC W/AUTO DIFF WBC: CPT | Performed by: PHYSICIAN ASSISTANT

## 2018-04-09 PROCEDURE — 74177 CT ABD & PELVIS W/CONTRAST: CPT

## 2018-04-09 PROCEDURE — 96374 THER/PROPH/DIAG INJ IV PUSH: CPT

## 2018-04-09 PROCEDURE — 96375 TX/PRO/DX INJ NEW DRUG ADDON: CPT

## 2018-04-09 PROCEDURE — 81001 URINALYSIS AUTO W/SCOPE: CPT | Performed by: PHYSICIAN ASSISTANT

## 2018-04-09 PROCEDURE — 80053 COMPREHEN METABOLIC PANEL: CPT | Performed by: PHYSICIAN ASSISTANT

## 2018-04-09 PROCEDURE — 99285 EMERGENCY DEPT VISIT HI MDM: CPT

## 2018-04-09 PROCEDURE — 96361 HYDRATE IV INFUSION ADD-ON: CPT

## 2018-04-09 PROCEDURE — 74011636320 HC RX REV CODE- 636/320: Performed by: EMERGENCY MEDICINE

## 2018-04-09 RX ORDER — TRAMADOL HYDROCHLORIDE 50 MG/1
50 TABLET ORAL
Qty: 12 TAB | Refills: 0 | Status: SHIPPED | OUTPATIENT
Start: 2018-04-09 | End: 2019-04-11

## 2018-04-09 RX ORDER — TRAMADOL HYDROCHLORIDE 50 MG/1
50 TABLET ORAL
Status: COMPLETED | OUTPATIENT
Start: 2018-04-09 | End: 2018-04-09

## 2018-04-09 RX ORDER — MORPHINE SULFATE 4 MG/ML
INJECTION INTRAVENOUS
Status: COMPLETED
Start: 2018-04-09 | End: 2018-04-09

## 2018-04-09 RX ORDER — MORPHINE SULFATE 4 MG/ML
2 INJECTION, SOLUTION INTRAMUSCULAR; INTRAVENOUS
Status: COMPLETED | OUTPATIENT
Start: 2018-04-09 | End: 2018-04-09

## 2018-04-09 RX ORDER — ONDANSETRON 2 MG/ML
4 INJECTION INTRAMUSCULAR; INTRAVENOUS
Status: COMPLETED | OUTPATIENT
Start: 2018-04-09 | End: 2018-04-09

## 2018-04-09 RX ORDER — ACETAMINOPHEN 325 MG/1
975 TABLET ORAL
Status: COMPLETED | OUTPATIENT
Start: 2018-04-09 | End: 2018-04-09

## 2018-04-09 RX ORDER — ACETAMINOPHEN 500 MG
1000 TABLET ORAL
Qty: 50 TAB | Refills: 0 | Status: SHIPPED | OUTPATIENT
Start: 2018-04-09

## 2018-04-09 RX ADMIN — MORPHINE SULFATE 2 MG: 4 INJECTION, SOLUTION INTRAMUSCULAR; INTRAVENOUS at 19:26

## 2018-04-09 RX ADMIN — ONDANSETRON 4 MG: 2 INJECTION INTRAMUSCULAR; INTRAVENOUS at 19:25

## 2018-04-09 RX ADMIN — IOPAMIDOL 80 ML: 612 INJECTION, SOLUTION INTRAVENOUS at 20:02

## 2018-04-09 RX ADMIN — SODIUM CHLORIDE 500 ML: 900 INJECTION, SOLUTION INTRAVENOUS at 19:29

## 2018-04-09 RX ADMIN — ACETAMINOPHEN 975 MG: 325 TABLET, FILM COATED ORAL at 21:09

## 2018-04-09 RX ADMIN — MORPHINE SULFATE 2 MG: 4 INJECTION INTRAVENOUS at 19:26

## 2018-04-09 RX ADMIN — TRAMADOL HYDROCHLORIDE 50 MG: 50 TABLET, FILM COATED ORAL at 21:09

## 2018-04-09 NOTE — ED TRIAGE NOTES
Pt c/o back,right flank, and left knee pain 2/85 with certain movement. Per pt, it started last week. Pt took Tylenol and did not help.

## 2018-04-10 NOTE — ED PROVIDER NOTES
HPI Comments: Angeline Cano is a 80 y.o. Female with c/o right sided back pain for last week getting worse. Worse with movement, bending. Better with rest. Some relief with tylenol . No fcs, nvd, urinary sx, cough, sob. No previous evaluation for pain. Constant spasm like pain      The history is provided by the patient. Past Medical History:   Diagnosis Date    Coronary artery disease     Degenerative joint disease     Degenerative spine disease     Dyslipidemia     Hypertension     Hypertension     Osteoarthritis        Past Surgical History:   Procedure Laterality Date    HX APPENDECTOMY      HX HYSTERECTOMY           History reviewed. No pertinent family history. Social History     Social History    Marital status:      Spouse name: N/A    Number of children: N/A    Years of education: N/A     Occupational History    Not on file. Social History Main Topics    Smoking status: Never Smoker    Smokeless tobacco: Never Used    Alcohol use No    Drug use: No    Sexual activity: No     Other Topics Concern    Not on file     Social History Narrative         ALLERGIES: Demerol [meperidine]; Erythromycin; Nitrofurantoin; Pcn [penicillins]; and Sulfa (sulfonamide antibiotics)    Review of Systems   Constitutional: Negative for appetite change and fever. HENT: Negative for sore throat and trouble swallowing. Eyes: Negative for visual disturbance. Respiratory: Negative for cough and shortness of breath. Cardiovascular: Negative for chest pain. Gastrointestinal: Positive for abdominal pain. Genitourinary: Positive for flank pain. Negative for difficulty urinating and hematuria. Musculoskeletal: Positive for back pain. Skin: Negative for rash. Allergic/Immunologic: Negative for immunocompromised state. Neurological: Negative for syncope. Psychiatric/Behavioral: Positive for sleep disturbance.        Vitals:    04/09/18 1815 04/09/18 1830 04/09/18 1845 04/09/18 1926   BP: (!) 230/96 (!) 139/110 176/84    Pulse:    84   Resp:    21   Temp:       SpO2:  99% 99% 97%   Weight:       Height:                Physical Exam   Constitutional: She is oriented to person, place, and time. She appears well-developed and well-nourished. She appears distressed (elderly appearing in mild distress over pain). HENT:   Head: Normocephalic and atraumatic. Right Ear: External ear normal.   Left Ear: External ear normal.   Nose: Nose normal.   Mouth/Throat: Uvula is midline, oropharynx is clear and moist and mucous membranes are normal.   Eyes: Conjunctivae are normal. No scleral icterus. Neck: Neck supple. Cardiovascular: Normal rate, regular rhythm, normal heart sounds and intact distal pulses. Pulmonary/Chest: Effort normal and breath sounds normal.   Abdominal: Soft. Normal appearance. She exhibits no distension, no pulsatile midline mass and no mass. There is no hepatosplenomegaly. There is tenderness. There is CVA tenderness. There is no rigidity, no rebound, no guarding, no tenderness at McBurney's point and negative Jarrell's sign. Musculoskeletal: She exhibits no edema. Neurological: She is alert and oriented to person, place, and time. Gait normal.   Skin: Skin is warm and dry. She is not diaphoretic. Psychiatric: Her behavior is normal.   Nursing note and vitals reviewed.        [facility]      ED Course       Procedures    Vitals:  Patient Vitals for the past 12 hrs:   Temp Pulse Resp BP SpO2   04/09/18 1926 - 84 21 - 97 %   04/09/18 1845 - - - 176/84 99 %   04/09/18 1830 - - - (!) 139/110 99 %   04/09/18 1815 - - - (!) 230/96 -   04/09/18 1801 - 77 15 - 98 %   04/09/18 1740 98.1 °F (36.7 °C) 77 20 (!) 201/99 99 %         Medications ordered:   Medications   morphine injection 2 mg (2 mg IntraVENous Given 4/9/18 1926)   ondansetron (ZOFRAN) injection 4 mg (4 mg IntraVENous Given 4/9/18 1925)   sodium chloride 0.9 % bolus infusion 500 mL (500 mL IntraVENous New Bag 4/9/18 1929)   iopamidol (ISOVUE 300) 61 % contrast injection 100-200 mL (80 mL IntraVENous Given 4/9/18 2002)   acetaminophen (TYLENOL) tablet 975 mg (975 mg Oral Given 4/9/18 2109)   traMADol (ULTRAM) tablet 50 mg (50 mg Oral Given 4/9/18 2109)         Lab findings:  Recent Results (from the past 12 hour(s))   URINALYSIS W/ RFLX MICROSCOPIC    Collection Time: 04/09/18  6:08 PM   Result Value Ref Range    Color YELLOW      Appearance CLEAR      Specific gravity 1.014 1.005 - 1.030      pH (UA) 5.0 5.0 - 8.0      Protein NEGATIVE  NEG mg/dL    Glucose NEGATIVE  NEG mg/dL    Ketone NEGATIVE  NEG mg/dL    Bilirubin NEGATIVE  NEG      Blood NEGATIVE  NEG      Urobilinogen 0.2 0.2 - 1.0 EU/dL    Nitrites NEGATIVE  NEG      Leukocyte Esterase TRACE (A) NEG     URINE MICROSCOPIC ONLY    Collection Time: 04/09/18  6:08 PM   Result Value Ref Range    WBC 0 to 3 0 - 4 /hpf    RBC 0 0 - 5 /hpf    Epithelial cells FEW 0 - 5 /lpf    Bacteria NEGATIVE  NEG /hpf   CBC WITH AUTOMATED DIFF    Collection Time: 04/09/18  6:24 PM   Result Value Ref Range    WBC 6.0 4.6 - 13.2 K/uL    RBC 3.61 (L) 4.20 - 5.30 M/uL    HGB 10.8 (L) 12.0 - 16.0 g/dL    HCT 33.0 (L) 35.0 - 45.0 %    MCV 91.4 74.0 - 97.0 FL    MCH 29.9 24.0 - 34.0 PG    MCHC 32.7 31.0 - 37.0 g/dL    RDW 12.5 11.6 - 14.5 %    PLATELET 368 744 - 643 K/uL    MPV 9.7 9.2 - 11.8 FL    NEUTROPHILS 68 40 - 73 %    LYMPHOCYTES 20 (L) 21 - 52 %    MONOCYTES 9 3 - 10 %    EOSINOPHILS 2 0 - 5 %    BASOPHILS 1 0 - 2 %    ABS. NEUTROPHILS 4.1 1.8 - 8.0 K/UL    ABS. LYMPHOCYTES 1.2 0.9 - 3.6 K/UL    ABS. MONOCYTES 0.5 0.05 - 1.2 K/UL    ABS. EOSINOPHILS 0.1 0.0 - 0.4 K/UL    ABS.  BASOPHILS 0.0 0.0 - 0.06 K/UL    DF AUTOMATED     METABOLIC PANEL, COMPREHENSIVE    Collection Time: 04/09/18  6:24 PM   Result Value Ref Range    Sodium 141 136 - 145 mmol/L    Potassium 4.4 3.5 - 5.5 mmol/L    Chloride 107 100 - 108 mmol/L    CO2 28 21 - 32 mmol/L    Anion gap 6 3.0 - 18 mmol/L    Glucose 91 74 - 99 mg/dL    BUN 27 (H) 7.0 - 18 MG/DL    Creatinine 1.16 0.6 - 1.3 MG/DL    BUN/Creatinine ratio 23 (H) 12 - 20      GFR est AA 53 (L) >60 ml/min/1.73m2    GFR est non-AA 43 (L) >60 ml/min/1.73m2    Calcium 8.7 8.5 - 10.1 MG/DL    Bilirubin, total 0.6 0.2 - 1.0 MG/DL    ALT (SGPT) 22 13 - 56 U/L    AST (SGOT) 23 15 - 37 U/L    Alk. phosphatase 91 45 - 117 U/L    Protein, total 7.0 6.4 - 8.2 g/dL    Albumin 3.7 3.4 - 5.0 g/dL    Globulin 3.3 2.0 - 4.0 g/dL    A-G Ratio 1.1 0.8 - 1.7         EKG interpretation by ED Physician:      X-Ray, CT or other radiology findings or impressions:  CT ABD PELV W CONT    (Results Pending)   nonobstructing right renal calculus. Possible 5mm right ureteral stone,   Mod rectal stool      Progress notes, Consult notes or additional Procedure notes:   No obvious acute infection, obstruction. Poss related to stone, muscular. No rash noted on exam  Doubt need for other work up here  I have discussed with patient and/or family/sig other the results, interpretation of any imaging if performed, suspected diagnosis and treatment plan to include instructions regarding the diagnoses listed to which understanding was expressed with all questions answered        Reevaluation of patient:   stable    Disposition:  Diagnosis:   1. Acute right flank pain    2.  Right nephrolithiasis        Disposition: home    Follow-up Information     Follow up With Details Comments [facility] MD Grace Schedule an appointment as soon as possible for a visit  [address] [facility] [address] [place] [address] Ta[place]      Shagufta Ramsey MD Schedule an appointment as soon as possible for a visit about your kidney stone [facility] EMERGENCY DEPT  If symptoms worsen [address]  [phone]            Patient's Medications   Start Taking    ACETAMINOPHEN (TYLENOL EXTRA STRENGTH) 500 MG TABLET Take 2 Tabs by mouth every six (6) hours as needed for Pain. Continue Taking    ASPIRIN DELAYED-RELEASE 81 MG TABLET    Take 1 Tab by mouth daily. ATORVASTATIN (LIPITOR) 40 MG TABLET    Take 1 Tab by mouth nightly. DICLOFENAC POTASSIUM (CATAFLAM) 50 MG TABLET    Take 50 mg by mouth two (2) times a day. ERGOCALCIFEROL (VITAMIN D2) 50,000 UNIT CAPSULE    Take 50,000 Units by mouth. FERROUS SULFATE (IRON) 325 MG (65 MG IRON) TABLET    Take  by mouth Daily (before breakfast). METOPROLOL (LOPRESSOR) 25 MG TABLET    Take 1 Tab by mouth every twelve (12) hours. Indications: HYPERTENSION    NITROGLYCERIN (NITROSTAT) 0.4 MG SL TABLET    1 Tab by SubLINGual route every five (5) minutes as needed for Chest Pain. PANTOPRAZOLE (PROTONIX) 40 MG TABLET    Take 40 mg by mouth daily. Indications: GASTROESOPHAGEAL REFLUX    POLYETHYLENE GLYCOL (MIRALAX) 17 GRAM PACKET    Take 1 Packet by mouth daily. TICAGRELOR (BRILINTA) 90 MG TABLET    Take 1 Tab by mouth two (2) times a day. VALSARTAN (DIOVAN) 320 MG TABLET    Take 0.5 Tabs by mouth daily. Indications: HYPERTENSION   These Medications have changed    Modified Medication Previous Medication    TRAMADOL (ULTRAM) 50 MG TABLET traMADol (ULTRAM) 50 mg tablet       Take 1 Tab by mouth every eight (8) hours as needed for Pain. Max Daily Amount: 150 mg. Take 50 mg by mouth every six (6) hours as needed for Pain. Stop Taking    ACETAMINOPHEN (TYLENOL) 325 MG TABLET    Take 2 Tabs by mouth every four (4) hours as needed. CHLORPHENIRAMINE-HYDROCODONE (TUSSIONEX PENNKINETIC ER) 10-8 MG/5 ML SUSPENSION    Take 2.5 mL by mouth every twelve (12) hours as needed for Cough. Max Daily Amount: 5 mL. HYDROCODONE-ACETAMINOPHEN (NORCO) 5-325 MG PER TABLET    Take 1-2 tablets PO every 4-6 hours as needed for pain control.   If over the counter ibuprofen or acetaminophen was suggested, then only take the vicodin for pain not well controlled with the over the counter medication.

## 2018-04-12 PROBLEM — N20.0 BILATERAL NEPHROLITHIASIS: Status: ACTIVE | Noted: 2018-04-12

## 2018-04-12 PROBLEM — M54.50 RIGHT LOW BACK PAIN: Status: ACTIVE | Noted: 2018-04-12

## 2018-05-15 PROBLEM — N28.1 RENAL CYST, LEFT: Status: ACTIVE | Noted: 2018-05-15

## 2018-09-20 ENCOUNTER — APPOINTMENT (OUTPATIENT)
Dept: GENERAL RADIOLOGY | Age: 83
DRG: 310 | End: 2018-09-20
Attending: EMERGENCY MEDICINE
Payer: MEDICARE

## 2018-09-20 ENCOUNTER — APPOINTMENT (OUTPATIENT)
Dept: NON INVASIVE DIAGNOSTICS | Age: 83
DRG: 310 | End: 2018-09-20
Attending: INTERNAL MEDICINE
Payer: MEDICARE

## 2018-09-20 ENCOUNTER — HOSPITAL ENCOUNTER (INPATIENT)
Age: 83
LOS: 4 days | Discharge: NURSING, REHAB OR LONG-TERM CARE | DRG: 310 | End: 2018-09-24
Attending: EMERGENCY MEDICINE | Admitting: INTERNAL MEDICINE
Payer: MEDICARE

## 2018-09-20 DIAGNOSIS — I48.91 NEW ONSET ATRIAL FIBRILLATION (HCC): Primary | ICD-10-CM

## 2018-09-20 DIAGNOSIS — R03.0 ELEVATED BLOOD PRESSURE READING: ICD-10-CM

## 2018-09-20 LAB
ANION GAP SERPL CALC-SCNC: 8 MMOL/L (ref 3–18)
APTT PPP: 26.2 SEC (ref 23–36.4)
BASOPHILS # BLD: 0 K/UL (ref 0–0.1)
BASOPHILS NFR BLD: 0 % (ref 0–2)
BUN SERPL-MCNC: 26 MG/DL (ref 7–18)
BUN/CREAT SERPL: 22 (ref 12–20)
CALCIUM SERPL-MCNC: 8.9 MG/DL (ref 8.5–10.1)
CHLORIDE SERPL-SCNC: 106 MMOL/L (ref 100–108)
CK MB CFR SERPL CALC: 2.5 % (ref 0–4)
CK MB SERPL-MCNC: 2.2 NG/ML (ref 5–25)
CK SERPL-CCNC: 87 U/L (ref 26–192)
CO2 SERPL-SCNC: 27 MMOL/L (ref 21–32)
CREAT SERPL-MCNC: 1.19 MG/DL (ref 0.6–1.3)
DIFFERENTIAL METHOD BLD: ABNORMAL
EOSINOPHIL # BLD: 0.1 K/UL (ref 0–0.4)
EOSINOPHIL NFR BLD: 1 % (ref 0–5)
ERYTHROCYTE [DISTWIDTH] IN BLOOD BY AUTOMATED COUNT: 12.7 % (ref 11.6–14.5)
GLUCOSE SERPL-MCNC: 104 MG/DL (ref 74–99)
HCT VFR BLD AUTO: 32.5 % (ref 35–45)
HGB BLD-MCNC: 10.6 G/DL (ref 12–16)
INR PPP: 1 (ref 0.8–1.2)
LYMPHOCYTES # BLD: 1.1 K/UL (ref 0.9–3.6)
LYMPHOCYTES NFR BLD: 21 % (ref 21–52)
MCH RBC QN AUTO: 30.2 PG (ref 24–34)
MCHC RBC AUTO-ENTMCNC: 32.6 G/DL (ref 31–37)
MCV RBC AUTO: 92.6 FL (ref 74–97)
MONOCYTES # BLD: 0.3 K/UL (ref 0.05–1.2)
MONOCYTES NFR BLD: 6 % (ref 3–10)
NEUTS SEG # BLD: 3.5 K/UL (ref 1.8–8)
NEUTS SEG NFR BLD: 72 % (ref 40–73)
PLATELET # BLD AUTO: 192 K/UL (ref 135–420)
PMV BLD AUTO: 9.8 FL (ref 9.2–11.8)
POTASSIUM SERPL-SCNC: 4.3 MMOL/L (ref 3.5–5.5)
PROTHROMBIN TIME: 13.2 SEC (ref 11.5–15.2)
RBC # BLD AUTO: 3.51 M/UL (ref 4.2–5.3)
SODIUM SERPL-SCNC: 141 MMOL/L (ref 136–145)
TROPONIN I SERPL-MCNC: 0.1 NG/ML (ref 0–0.04)
TROPONIN I SERPL-MCNC: <0.02 NG/ML (ref 0–0.04)
TSH SERPL DL<=0.05 MIU/L-ACNC: 0.39 UIU/ML (ref 0.36–3.74)
WBC # BLD AUTO: 5 K/UL (ref 4.6–13.2)

## 2018-09-20 PROCEDURE — 99285 EMERGENCY DEPT VISIT HI MDM: CPT

## 2018-09-20 PROCEDURE — 96361 HYDRATE IV INFUSION ADD-ON: CPT

## 2018-09-20 PROCEDURE — 74011000250 HC RX REV CODE- 250: Performed by: EMERGENCY MEDICINE

## 2018-09-20 PROCEDURE — 71045 X-RAY EXAM CHEST 1 VIEW: CPT

## 2018-09-20 PROCEDURE — 80048 BASIC METABOLIC PNL TOTAL CA: CPT | Performed by: EMERGENCY MEDICINE

## 2018-09-20 PROCEDURE — 65660000000 HC RM CCU STEPDOWN

## 2018-09-20 PROCEDURE — 74011250636 HC RX REV CODE- 250/636: Performed by: INTERNAL MEDICINE

## 2018-09-20 PROCEDURE — 93005 ELECTROCARDIOGRAM TRACING: CPT

## 2018-09-20 PROCEDURE — 96374 THER/PROPH/DIAG INJ IV PUSH: CPT

## 2018-09-20 PROCEDURE — 85730 THROMBOPLASTIN TIME PARTIAL: CPT | Performed by: EMERGENCY MEDICINE

## 2018-09-20 PROCEDURE — 82550 ASSAY OF CK (CPK): CPT | Performed by: INTERNAL MEDICINE

## 2018-09-20 PROCEDURE — 85610 PROTHROMBIN TIME: CPT | Performed by: EMERGENCY MEDICINE

## 2018-09-20 PROCEDURE — 84443 ASSAY THYROID STIM HORMONE: CPT | Performed by: INTERNAL MEDICINE

## 2018-09-20 PROCEDURE — 36415 COLL VENOUS BLD VENIPUNCTURE: CPT | Performed by: INTERNAL MEDICINE

## 2018-09-20 PROCEDURE — 74011250637 HC RX REV CODE- 250/637: Performed by: INTERNAL MEDICINE

## 2018-09-20 PROCEDURE — 93308 TTE F-UP OR LMTD: CPT

## 2018-09-20 PROCEDURE — 85025 COMPLETE CBC W/AUTO DIFF WBC: CPT | Performed by: EMERGENCY MEDICINE

## 2018-09-20 PROCEDURE — 74011250636 HC RX REV CODE- 250/636: Performed by: EMERGENCY MEDICINE

## 2018-09-20 PROCEDURE — 84484 ASSAY OF TROPONIN QUANT: CPT | Performed by: EMERGENCY MEDICINE

## 2018-09-20 RX ORDER — POLYETHYLENE GLYCOL 3350 17 G/17G
17 POWDER, FOR SOLUTION ORAL DAILY
Status: DISCONTINUED | OUTPATIENT
Start: 2018-09-21 | End: 2018-09-24 | Stop reason: HOSPADM

## 2018-09-20 RX ORDER — PANTOPRAZOLE SODIUM 40 MG/1
40 TABLET, DELAYED RELEASE ORAL
Status: DISCONTINUED | OUTPATIENT
Start: 2018-09-21 | End: 2018-09-24 | Stop reason: HOSPADM

## 2018-09-20 RX ORDER — GUAIFENESIN 100 MG/5ML
324 LIQUID (ML) ORAL
Status: DISCONTINUED | OUTPATIENT
Start: 2018-09-20 | End: 2018-09-20

## 2018-09-20 RX ORDER — DILTIAZEM HYDROCHLORIDE 60 MG/1
60 TABLET, FILM COATED ORAL
Status: COMPLETED | OUTPATIENT
Start: 2018-09-20 | End: 2018-09-21

## 2018-09-20 RX ORDER — METOPROLOL TARTRATE 25 MG/1
25 TABLET, FILM COATED ORAL EVERY 12 HOURS
Status: DISCONTINUED | OUTPATIENT
Start: 2018-09-20 | End: 2018-09-24 | Stop reason: HOSPADM

## 2018-09-20 RX ORDER — ATORVASTATIN CALCIUM 40 MG/1
40 TABLET, FILM COATED ORAL
Status: DISCONTINUED | OUTPATIENT
Start: 2018-09-20 | End: 2018-09-24 | Stop reason: HOSPADM

## 2018-09-20 RX ORDER — HYDRALAZINE HYDROCHLORIDE 20 MG/ML
10 INJECTION INTRAMUSCULAR; INTRAVENOUS
Status: DISCONTINUED | OUTPATIENT
Start: 2018-09-20 | End: 2018-09-24 | Stop reason: HOSPADM

## 2018-09-20 RX ORDER — DILTIAZEM HYDROCHLORIDE 5 MG/ML
10 INJECTION INTRAVENOUS
Status: COMPLETED | OUTPATIENT
Start: 2018-09-20 | End: 2018-09-20

## 2018-09-20 RX ADMIN — DILTIAZEM HYDROCHLORIDE 10 MG: 5 INJECTION INTRAVENOUS at 11:19

## 2018-09-20 RX ADMIN — HYDRALAZINE HYDROCHLORIDE 10 MG: 20 INJECTION INTRAMUSCULAR; INTRAVENOUS at 18:37

## 2018-09-20 RX ADMIN — ATORVASTATIN CALCIUM 40 MG: 40 TABLET, FILM COATED ORAL at 21:20

## 2018-09-20 RX ADMIN — SODIUM CHLORIDE 1000 ML: 900 INJECTION, SOLUTION INTRAVENOUS at 11:18

## 2018-09-20 RX ADMIN — METOPROLOL TARTRATE 25 MG: 25 TABLET ORAL at 20:20

## 2018-09-20 RX ADMIN — DILTIAZEM HYDROCHLORIDE 60 MG: 60 TABLET, FILM COATED ORAL at 17:40

## 2018-09-20 RX ADMIN — METOPROLOL TARTRATE 25 MG: 25 TABLET ORAL at 14:51

## 2018-09-20 RX ADMIN — APIXABAN 2.5 MG: 2.5 TABLET, FILM COATED ORAL at 17:40

## 2018-09-20 NOTE — ED TRIAGE NOTES
Alert female arrives by EMS george feeling weak and dizzy. Pt denies chest pain. States took Nitro x 2 and ASA because she felt like she had high blood pressure.   Pt hr in the 160s upon arrival to ED

## 2018-09-20 NOTE — ED NOTES
TRANSFER - ED to INPATIENT REPORT: 
 
SBAR report made available to receiving floor on this patient being transferred to [address] [place] [address] ([facility])  for routine progression of care Admitting diagnosis New onset atrial fibrillation ([facility] [address] Information from the following report(s) SBAR and ED Summary was made available to receiving floor. Lines:  
Peripheral IV 09/20/18 Left Antecubital (Active) Site Assessment Clean, dry, & intact 9/20/2018 11:18 AM  
Phlebitis Assessment 0 9/20/2018 11:18 AM  
Infiltration Assessment 0 9/20/2018 11:18 AM  
Dressing Status Clean, dry, & intact 9/20/2018 11:18 AM  
Dressing Type Transparent 9/20/2018 11:18 AM  
Hub Color/Line Status Green;Flushed 9/20/2018 11:18 AM  
Action Taken Blood drawn 9/20/2018 11:18 AM  
  
 
Medication list unable to confirm Opportunity for questions and clarification was provided. Patient is oriented to time, place, person and situation Patient is  continent and ambulatory with assist 
  
Valuables transported with patient Patient transported with: 
 Monitor Registered Nurse

## 2018-09-20 NOTE — ROUTINE PROCESS
1345 - patient to [phone] [id number] via stretcher from ER - denies any pain or SOB at this time. Oriented to unit and call system - 
 
1430 - placed on tele # 45 - AFib per tele tech. Patient eating - admission completed 1630 - echo at bedside 1730 - sitting up for dinner - no complaints - PM meds given 1830 - PRN hydralazine administered for BP > 170. 
 
1900 - repeat /64

## 2018-09-20 NOTE — IP AVS SNAPSHOT
[address] [address] 
[phone] Patient: [facility] ERNESTINE MRN: [id number] About your hospitalization You were admitted on:  2018 You last received care in the:  [address] [place] [address] You were discharged on:  2018 Why you were hospitalized Your primary diagnosis was:  Not on File Your diagnoses also included:  New Onset Atrial Fibrillation (Hcc), Htn (Hypertension), Cad (Coronary Artery Disease), Hypercholesteremia, Gerd (Gastroesophageal Reflux Disease), Stomatitis And Mucositis Follow-up Information Follow up With Details Comments Contact Info MD Ozzie Emanuel recent hospitalization [address] [facility] [address] [facility] [place] [id number] [phone] Discharge Orders None A check john indicates which time of day the medication should be taken. My Medications START taking these medications Instructions Each Dose to Equal  
 Morning Noon Evening Bedtime  
 apixaban 2.5 mg tablet Commonly known as:  All Rick Your last dose was: Your next dose is: Take 1 Tab by mouth two (2) times a day. 2.5 mg  
    
   
   
   
  
 dilTIAZem  mg ER capsule Commonly known as:  CARDIZEM CD Your last dose was: Your next dose is: Take 1 Cap by mouth daily. 180 mg CHANGE how you take these medications Instructions Each Dose to Equal  
 Morning Noon Evening Bedtime  
 metoprolol tartrate 25 mg tablet Commonly known as:  LOPRESSOR What changed:  how much to take Your last dose was: Your next dose is: Take 1 Tab by mouth every twelve (12) hours. Indications: HYPERTENSION  
 25 mg CONTINUE taking these medications Instructions Each Dose to Equal  
 Morning Noon Evening Bedtime acetaminophen 500 mg tablet Commonly known as:  [address] [facility] Your last dose was: Your next dose is: Take 2 Tabs by mouth every six (6) hours as needed for Pain. 1000 mg  
    
   
   
   
  
 atorvastatin 40 mg tablet Commonly known as:  LIPITOR Your last dose was: Your next dose is: Take 1 Tab by mouth nightly. 40 mg  
    
   
   
   
  
 nitroglycerin 0.4 mg SL tablet Commonly known as:  NITROSTAT Your last dose was: Your next dose is:    
   
   
 1 Tab by SubLINGual route every five (5) minutes as needed for Chest Pain. 0.4 mg  
    
   
   
   
  
 polyethylene glycol 17 gram packet Commonly known as:  Ronne Shahzad Your last dose was: Your next dose is: Take 1 Packet by mouth daily. 17 g PROTONIX 40 mg tablet Generic drug:  pantoprazole Your last dose was: Your next dose is: Take 40 mg by mouth daily. Indications: GASTROESOPHAGEAL REFLUX  
 40 mg  
    
   
   
   
  
 traMADol 50 mg tablet Commonly known as:  ULTRAM  
   
Your last dose was: Your next dose is: Take 1 Tab by mouth every eight (8) hours as needed for Pain. Max Daily Amount: 150 mg.  
 50 mg  
    
   
   
   
  
 VITAMIN D2 50,000 unit capsule Generic drug:  ergocalciferol Your last dose was: Your next dose is: Take 50,000 Units by mouth. [address]15 Units STOP taking these medications   
 aspirin delayed-release 81 mg tablet  
   
  
 diclofenac potassium 50 mg tablet Commonly known as:  CATAFLAM  
   
  
 Iron 325 mg (65 mg iron) tablet Generic drug:  ferrous sulfate  
   
  
 ticagrelor 90 mg tablet Commonly known as:  BRILINTA  
   
  
 valsartan 320 mg tablet Commonly known as:  DIOVAN Where to Get Your Medications Information on where to get these meds will be given to you by the nurse or doctor. ! Ask your nurse or doctor about these medications  
  apixaban 2.5 mg tablet  
 dilTIAZem  mg ER capsule Opioid Education Prescription Opioids: What You Need to Know: 
 
 
 
F-face looks uneven A-arms unable to move or move unevenly S-speech slurred or non-existent T-time-call 911 as soon as signs and symptoms begin-DO NOT go Back to bed or wait to see if you get better-TIME IS BRAIN. Warning Signs of HEART ATTACK Call 911 if you have these symptoms: 
? Chest discomfort. Most heart attacks involve discomfort in the center of the chest that lasts more than a few minutes, or that goes away and comes back. It can feel like uncomfortable pressure, squeezing, fullness, or pain. ? Discomfort in other areas of the upper body. Symptoms can include pain or discomfort in one or both arms, the back, neck, jaw, or stomach. ? Shortness of breath with or without chest discomfort. ? Other signs may include breaking out in a cold sweat, nausea, or lightheadedness. Don't wait more than five minutes to call [address]! Fast action can save your life. Calling 911 is almost always the fastest way to get lifesaving treatment.  Emergency Medical Services staff can begin treatment when they arrive  up to an hour sooner than if someone gets to the hospital by car. The discharge information has been reviewed with the patient. The patient verbalized understanding. Discharge medications reviewed with the patient and appropriate educational materials and side effects teaching were provided. Patient armband removed and shredded Atrial Fibrillation: Care Instructions Your Care Instructions Atrial fibrillation is an irregular and often fast heartbeat. Treating this condition is important for several reasons. It can cause blood clots, which can travel from your heart to your brain and cause a stroke. If you have a fast heartbeat, you may feel lightheaded, dizzy, and weak. An irregular heartbeat can also increase your risk for heart failure. Atrial fibrillation is often the result of another heart condition, such as high blood pressure or coronary artery disease. Making changes to improve your heart condition will help you stay healthy and active. Follow-up care is a key part of your treatment and safety. Be sure to make and go to all appointments, and call your doctor if you are having problems. It's also a good idea to know your test results and keep a list of the medicines you take. How can you care for yourself at home? Medicines 
  · Take your medicines exactly as prescribed. Call your doctor if you think you are having a problem with your medicine. You will get more details on the specific medicines your doctor prescribes.  
  · If your doctor has given you a blood thinner to prevent a stroke, be sure you get instructions about how to take your medicine safely. Blood thinners can cause serious bleeding problems.  
  · Do not take any vitamins, over-the-counter drugs, or herbal products without talking to your doctor first.  
 Lifestyle changes 
  · Do not smoke.  Smoking can increase your chance of a stroke and heart attack. If you need help quitting, talk to your doctor about stop-smoking programs and medicines. These can increase your chances of quitting for good.  
  · Eat a heart-healthy diet.  
  · Stay at a healthy weight. Lose weight if you need to.  
  · Limit alcohol to 2 drinks a day for men and 1 drink a day for women. Too much alcohol can cause health problems.  
  · Avoid colds and flu. Get a pneumococcal vaccine shot. If you have had one before, ask your doctor whether you need another dose. Get a flu shot every year. If you must be around people with colds or flu, wash your hands often. Activity 
  · If your doctor recommends it, get more exercise. Walking is a good choice. Bit by bit, increase the amount you walk every day. Try for at least 30 minutes on most days of the week. You also may want to swim, bike, or do other activities. Your doctor may suggest that you join a cardiac rehabilitation program so that you can have help increasing your physical activity safely.  
  · Start light exercise if your doctor says it is okay. Even a small amount will help you get stronger, have more energy, and manage stress. Walking is an easy way to get exercise. Start out by walking a little more than you did in the hospital. Gradually increase the amount you walk.  
  · When you exercise, watch for signs that your heart is working too hard. You are pushing too hard if you cannot talk while you are exercising. If you become short of breath or dizzy or have chest pain, sit down and rest immediately.  
  · Check your pulse regularly. Place two fingers on the artery at the palm side of your wrist, in line with your thumb. If your heartbeat seems uneven or fast, talk to your doctor. When should you call for help? Call 911 anytime you think you may need emergency care. For example, call if: 
  · You have symptoms of a heart attack. These may include: ¨ Chest pain or pressure, or a strange feeling in the chest. 
¨ Sweating. ¨ Shortness of breath. ¨ Nausea or vomiting. ¨ Pain, pressure, or a strange feeling in the back, neck, jaw, or upper belly or in one or both shoulders or arms. ¨ Lightheadedness or sudden weakness. ¨ A fast or irregular heartbeat. After you call 911, the  may tell you to chew 1 adult-strength or 2 to 4 low-dose aspirin. Wait for an ambulance. Do not try to drive yourself.  
  · You have symptoms of a stroke. These may include: 
¨ Sudden numbness, tingling, weakness, or loss of movement in your face, arm, or leg, especially on only one side of your body. ¨ Sudden vision changes. ¨ Sudden trouble speaking. ¨ Sudden confusion or trouble understanding simple statements. ¨ Sudden problems with walking or balance. ¨ A sudden, severe headache that is different from past headaches.  
  · You passed out (lost consciousness).  
 Call your doctor now or seek immediate medical care if: 
  · You have new or increased shortness of breath.  
  · You feel dizzy or lightheaded, or you feel like you may faint.  
  · Your heart rate becomes irregular.  
  · You can feel your heart flutter in your chest or skip heartbeats. Tell your doctor if these symptoms are new or worse.  
 Watch closely for changes in your health, and be sure to contact your doctor if you have any problems. Where can you learn more? Go to http://christ-antwon.info/. Enter U020 in the search box to learn more about \"Atrial Fibrillation: Care Instructions. \" Current as of: December 6, 2017 Content Version: 11.7 © 9117-1759 [employer]. Care instructions adapted under license by [employer] (which disclaims liability or warranty for this information). If you have questions about a medical condition or this instruction, always ask your healthcare professional. [place] [address] any warranty or liability for your use of this information. High Blood Pressure: Care Instructions Your Care Instructions If your blood pressure is usually above 130/80, you have high blood pressure, or hypertension. That means the top number is 130 or higher or the bottom number is 80 or higher, or both. Despite what a lot of people think, high blood pressure usually doesn't cause headaches or make you feel dizzy or lightheaded. It usually has no symptoms. But it does increase your risk for heart attack, stroke, and kidney or eye damage. The higher your blood pressure, the more your risk increases. Your doctor will give you a goal for your blood pressure. Your goal will be based on your health and your age. Lifestyle changes, such as eating healthy and being active, are always important to help lower blood pressure. You might also take medicine to reach your blood pressure goal. 
Follow-up care is a key part of your treatment and safety. Be sure to make and go to all appointments, and call your doctor if you are having problems. It's also a good idea to know your test results and keep a list of the medicines you take. How can you care for yourself at home? Medical treatment · If you stop taking your medicine, your blood pressure will go back up. You may take one or more types of medicine to lower your blood pressure. Be safe with medicines. Take your medicine exactly as prescribed. Call your doctor if you think you are having a problem with your medicine. · Talk to your doctor before you start taking aspirin every day. Aspirin can help certain people lower their risk of a heart attack or stroke. But taking aspirin isn't right for everyone, because it can cause serious bleeding. · See your doctor regularly. You may need to see the doctor more often at first or until your blood pressure comes down. · If you are taking blood pressure medicine, talk to your doctor before you take decongestants or anti-inflammatory medicine, such as ibuprofen. Some of these medicines can raise blood pressure. · Learn how to check your blood pressure at home. Lifestyle changes · Stay at a healthy weight. This is especially important if you put on weight around the waist. Losing even 10 pounds can help you lower your blood pressure. · If your doctor recommends it, get more exercise. Walking is a good choice. Bit by bit, increase the amount you walk every day. Try for at least 30 minutes on most days of the week. You also may want to swim, bike, or do other activities. · Avoid or limit alcohol. Talk to your doctor about whether you can drink any alcohol. · Try to limit how much sodium you eat to less than 2,300 milligrams (mg) a day. Your doctor may ask you to try to eat less than 1,500 mg a day. · Eat plenty of fruits (such as bananas and oranges), vegetables, legumes, whole grains, and low-fat dairy products. · Lower the amount of saturated fat in your diet. Saturated fat is found in animal products such as milk, cheese, and meat. Limiting these foods may help you lose weight and also lower your risk for heart disease. · Do not smoke. Smoking increases your risk for heart attack and stroke. If you need help quitting, talk to your doctor about stop-smoking programs and medicines. These can increase your chances of quitting for good. When should you call for help? Call 911 anytime you think you may need emergency care. This may mean having symptoms that suggest that your blood pressure is causing a serious heart or blood vessel problem. Your blood pressure may be over 180/110. 
 For example, call 911 if: 
  · You have symptoms of a heart attack. These may include: ¨ Chest pain or pressure, or a strange feeling in the chest. 
¨ Sweating. ¨ Shortness of breath. ¨ Nausea or vomiting. ¨ Pain, pressure, or a strange feeling in the back, neck, jaw, or upper belly or in one or both shoulders or arms. ¨ Lightheadedness or sudden weakness. ¨ A fast or irregular heartbeat.   · You have symptoms of a stroke. These may include: 
¨ Sudden numbness, tingling, weakness, or loss of movement in your face, arm, or leg, especially on only one side of your body. ¨ Sudden vision changes. ¨ Sudden trouble speaking. ¨ Sudden confusion or trouble understanding simple statements. ¨ Sudden problems with walking or balance. ¨ A sudden, severe headache that is different from past headaches.  
  · You have severe back or belly pain.  
 Do not wait until your blood pressure comes down on its own. Get help right away. 
 Call your doctor now or seek immediate care if: 
  · Your blood pressure is much higher than normal (such as 180/110 or higher), but you don't have symptoms.  
  · You think high blood pressure is causing symptoms, such as: ¨ Severe headache. ¨ Blurry vision.  
 Watch closely for changes in your health, and be sure to contact your doctor if: 
  · Your blood pressure measures 140/90 or higher at least 2 times. That means the top number is 140 or higher or the bottom number is 90 or higher, or both.  
  · You think you may be having side effects from your blood pressure medicine.  
  · Your blood pressure is usually normal, but it goes above normal at least 2 times. Where can you learn more? Go to http://christ-antwon.info/. Enter U747 in the search box to learn more about \"High Blood Pressure: Care Instructions. \" Current as of: December 6, 2017 Content Version: 11.7 © 6696-6987 [employer]. Care instructions adapted under license by [employer] (which disclaims liability or warranty for this information). If you have questions about a medical condition or this instruction, always ask your healthcare professional. [address] any warranty or liability for your use of this information.  
 
___________________________________________________________________________ ________________________________________________________ Aliveshoes Announcement We are excited to announce that we are making your provider's discharge notes available to you in Aliveshoes. You will see these notes when they are completed and signed by the physician that discharged you from your recent hospital stay. If you have any questions or concerns about any information you see in Aliveshoes, please call the Health Information Department where you were seen or reach out to your Primary Care Provider for more information about your plan of care. Introducing [facility]! Serafin Chino introduces Aliveshoes patient portal. Now you can access parts of your medical record, email your doctor's office, and request medication refills online. 1. In your internet browser, go to [email or web address] 2. Click on the First Time User? Click Here link in the Sign In box. You will see the New Member Sign Up page. 3. Enter your Aliveshoes Access Code exactly as it appears below. You will not need to use this code after youve completed the sign-up process. If you do not sign up before the expiration date, you must request a new code. · Aliveshoes Access Code: [email or web address] Expires: 12/19/2018 11:00 AM 
 
4. Enter the last four digits of your Social Security Number (xxxx) and Date of Birth (mm/dd/yyyy) as indicated and click Submit. You will be taken to the next sign-up page. 5. Create a Aliveshoes ID. This will be your Aliveshoes login ID and cannot be changed, so think of one that is secure and easy to remember. 6. Create a Aliveshoes password. You can change your password at any time. 7. Enter your Password Reset Question and Answer. This can be used at a later time if you forget your password. 8. Enter your e-mail address. You will receive e-mail notification when new information is available in [address] 9. Click Sign Up. You can now view and download portions of your medical record. 10. Click the Download Summary menu link to download a portable copy of your medical information. If you have questions, please visit the Frequently Asked Questions section of the ybuyt website. Remember, [employer] is NOT to be used for urgent needs. For medical emergencies, dial 911. Now available from your iPhone and Android! Introducing Manuel Abdul As [facility], I wanted to make you aware of our electronic visit tool called Manuel Abdul. [employer] allows you to connect within minutes with a medical provider 24 hours a day, seven days a week via a mobile device or tablet or logging into a secure website from your computer. You can access Manuel Abdul from anywhere in the [place]. A virtual visit might be right for you when you have a simple condition and feel like you just dont want to get out of bed, or cant get away from work for an appointment, when your regular [place] provider is not available (evenings, weekends or holidays), or when youre out of town and need minor care. Electronic visits cost only $49 and if the [employer] provider determines a prescription is needed to treat your condition, one can be electronically transmitted to a nearby pharmacy*. Please take a moment to enroll today if you have not already done so. The enrollment process is free and takes just a few minutes. To enroll, please download the [employer] warren to your tablet or phone, or visit [email or web address] to enroll on your computer. And, as an [address] [place] [address] patient with a [employer] account, the results of your visits will be scanned into your electronic medical record and your primary care provider will be able to view the scanned results. We urge you to continue to see your regular [facility] provider for your ongoing medical care.   And while your primary care provider may not be the one available when you seek a Manuel Sappmiguelfin virtual visit, the peace of mind you get from getting a real diagnosis real time can be priceless. For more information on Manuel Diazfin, view our Frequently Asked Questions (FAQs) at [email or web address]. Sincerely, 
 
Corinne Parra MD 
Chief Medical Officer Gloria Ho *:  certain medications cannot be prescribed via Manuel Sekougarret Providers Seen During Your Hospitalization Provider Specialty Primary office phone Vida Benito MD Emergency Medicine [phone] Ling Head MD Geriatric Medicine [phone] Your Primary Care Physician (PCP) Primary Care Physician Office Phone Office Fax Josefina Harmon [phone] You are allergic to the following Allergen Reactions Demerol (Meperidine) Other (comments) Oral pain Erythromycin Other (comments) Nitrofurantoin Other (comments) Mouth pain   
    
 Pcn (Penicillins) Other (comments) Oral pain   
    
 Sulfa (Sulfonamide Antibiotics) Other (comments) Oral pain Recent Documentation Height Weight Breastfeeding? BMI OB Status Smoking Status 1.626 m 58.4 kg No 22.1 kg/m2 Hysterectomy Never Smoker Emergency Contacts Name Discharge Info Relation Home Work Mobile Ag Snyder DISCHARGE CAREGIVER [3] Child [2] [phone] Patient Belongings The following personal items are in your possession at time of discharge: 
  Dental Appliances: None  Visual Aid: Glasses      Home Medications: None   Jewelry: Ring (gold band L ring finger)  Clothing: Slippers, Undergarments    Other Valuables: Purse Please provide this summary of care documentation to your next provider. Signatures-by signing, you are acknowledging that this After Visit Summary has been reviewed with you and you have received a copy.   
  
 
  
    
    
 Patient Signature: ____________________________________________________________ Date:  ____________________________________________________________  
  
Josph Perfect Provider Signature:  ____________________________________________________________ Date:  ____________________________________________________________

## 2018-09-20 NOTE — PROGRESS NOTES
Chart reviewed and patient verified demographics. She has [place] and [place],  for life and also [employer] qualified Long Term Insurances. I put copy of the long term insurance on front of chart and I will email registration. She sees Dr Virgilio Palmer and Dr Tg Mcleod as PCP's. She uses a cane or walker to ambulate. 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 She lives alone and is having some issues with being alone. She has someone to cut the grass, some one who she pays to come in sometimes to clean her house. She has a disabled neighbor that picks up groceries. She has no family in the area. She is able to talk and give information well, she seems able to make decisions. Her son Bhavna Weiss is her only surviviing child, two other sons passed away. His phone number is listed as [phone], but the mailbox is full. I looked in previous admission and have a # [phone] which son's wife, Bharathi Vásquez answered. Capri Barrera is a  and is not able to get or receive calls a lot. He is patient's POA, she has the document at home. I see no advance directive. Ms Chacha Marcial will let  know, and I gave her  Aleja's number to call tomorrow. She says she would only consider going to [facility] for rehab, no other facilities. I posted in [facility]. She says she would really like to get long term personal care aide to come in and help her. She said they had sent her information on that, but states she did nothing else with the info at the time. Her [address] is still valid and could give her the help she needs for home . The phone number for son or patient to call is [phone], then option #1, then option # 5, then option #3. That is what the insurance gave me ad direct number to get them where they can talk to company.  I had used the number on the card -[phone] but that is not direct. Her policy # is [id number]. It can provide nursing homes, assisted living facilities, hospice, or home health or personal care. They have steps in place to get personal care aides. The patient or family will need to arrange it. I have placed a FYI asking MD to order PT/OT while in hospital. To go to [facility], she will need 3 over nights and two skills. Reason for Admission:   A fib RRAT Score:     20 Resources/supports as identified by patient/family:  Son out of state Top Challenges facing patient (as identified by patient/family and CM): Finances/Medication cost?       
           
Transportation? She will have no transportation unless disabled neighbor can drive her home. Support system or lack thereof? Lacks this. But she has long term care insurance Living arrangements? Lives alone. Self-care/ADLs/Cognition? Very able Current Advanced Directive/Advance Care Plan:  no 
                       
Plan for utilizing home health:    snf vs home health PT/OT, would like skilled nurse for disease management and medication education. Likelihood of readmission:  Patient is 80years old and has issues, co morbiditys Transition of Care Plan:      snf vs home with home health

## 2018-09-20 NOTE — PROGRESS NOTES
completed the initial Spiritual Assessment of the patient in bed 5 of the emergency room  and offered Pastoral Care support. No family seen at time of this visit. . Patient does not have any Restorationism/cultural needs that will affect patients preferences in health care. Chaplains will continue to follow and will provide pastoral care on an as needed/requested basis. Andreia Machado Board Certified [facility] Care Department [phone]

## 2018-09-20 NOTE — IP AVS SNAPSHOT
[address] [address] 
[phone] Patient: [facility] ERNESTINE MRN: [id number] OKI:[birth year] A check john indicates which time of day the medication should be taken. My Medications START taking these medications Instructions Each Dose to Equal  
 Morning Noon Evening Bedtime  
 apixaban 2.5 mg tablet Commonly known as:  Marcia Paz Your last dose was: Your next dose is: Take 1 Tab by mouth two (2) times a day. 2.5 mg  
    
   
   
   
  
 dilTIAZem  mg ER capsule Commonly known as:  CARDIZEM CD Your last dose was: Your next dose is: Take 1 Cap by mouth daily. 180 mg CHANGE how you take these medications Instructions Each Dose to Equal  
 Morning Noon Evening Bedtime  
 metoprolol tartrate 25 mg tablet Commonly known as:  LOPRESSOR What changed:  how much to take Your last dose was: Your next dose is: Take 1 Tab by mouth every twelve (12) hours. Indications: HYPERTENSION  
 25 mg CONTINUE taking these medications Instructions Each Dose to Equal  
 Morning Noon Evening Bedtime  
 acetaminophen 500 mg tablet Commonly known as:  [address] [facility] Your last dose was: Your next dose is: Take 2 Tabs by mouth every six (6) hours as needed for Pain. 1000 mg  
    
   
   
   
  
 atorvastatin 40 mg tablet Commonly known as:  LIPITOR Your last dose was: Your next dose is: Take 1 Tab by mouth nightly. 40 mg  
    
   
   
   
  
 nitroglycerin 0.4 mg SL tablet Commonly known as:  NITROSTAT Your last dose was: Your next dose is:    
   
   
 1 Tab by SubLINGual route every five (5) minutes as needed for Chest Pain. 0.4 mg  
    
   
   
   
  
 polyethylene glycol 17 gram packet Commonly known as:  Keily Gannon Your last dose was: Your next dose is: Take 1 Packet by mouth daily. 17 g PROTONIX 40 mg tablet Generic drug:  pantoprazole Your last dose was: Your next dose is: Take 40 mg by mouth daily. Indications: GASTROESOPHAGEAL REFLUX  
 40 mg  
    
   
   
   
  
 traMADol 50 mg tablet Commonly known as:  ULTRAM  
   
Your last dose was: Your next dose is: Take 1 Tab by mouth every eight (8) hours as needed for Pain. Max Daily Amount: 150 mg.  
 50 mg  
    
   
   
   
  
 VITAMIN D2 50,000 unit capsule Generic drug:  ergocalciferol Your last dose was: Your next dose is: Take 50,000 Units by mouth. 95522 Units STOP taking these medications   
 aspirin delayed-release 81 mg tablet  
   
  
 diclofenac potassium 50 mg tablet Commonly known as:  CATAFLAM  
   
  
 Iron 325 mg (65 mg iron) tablet Generic drug:  ferrous sulfate  
   
  
 ticagrelor 90 mg tablet Commonly known as:  BRILINTA  
   
  
 valsartan 320 mg tablet Commonly known as:  DIOVAN Where to Get Your Medications Information on where to get these meds will be given to you by the nurse or doctor. ! Ask your nurse or doctor about these medications  
  apixaban 2.5 mg tablet  
 dilTIAZem  mg ER capsule

## 2018-09-20 NOTE — ED PROVIDER NOTES
EMERGENCY DEPARTMENT HISTORY AND PHYSICAL EXAM 
 
11:01 AM 
 
 
Date: 9/20/2018 Patient Name: [facility] ERNESTINE History of Presenting Illness Chief Complaint Patient presents with  Dizziness History Provided By: Patient Chief Complaint: Chest palpitations Duration:  Hours Timing:  Constant Location: chest 
Quality: \"faster than normal\" Severity: Moderate Modifying Factors: No modifying or aggravating factors were reported. Associated Symptoms: weakness Additional History (Context): 11:01 AM [facility] ERNESTINE is a 80 y.o. female with h/o HTN, dyslipidemia, CAD who presents to ED complaining of moderate constant chest palpitations with onset a few hours pta. Pt states she has not been feeling well over the past week. Associated symptoms include weakness. Denies abd pain, diarrhea, fever, cough. Reports she does take aspirin daily. Denies having a cardiology. Pt does live alone. She did apply 2 nitro this morning because her face \"felt funny. \" No modifying or aggravating factors were reported. No other concerns or symptoms at this time. PCP: Amira Staples MD 
 
 
 
Current Facility-Administered Medications Medication Dose Route Frequency Provider Last Rate Last Dose  aspirin chewable tablet 324 mg  324 mg Oral NOW Kelsie Albarran MD      
 
Current Outpatient Prescriptions Medication Sig Dispense Refill  traMADol (ULTRAM) 50 mg tablet Take 1 Tab by mouth every eight (8) hours as needed for Pain. Max Daily Amount: 150 mg. 12 Tab 0  
 acetaminophen (TYLENOL EXTRA STRENGTH) 500 mg tablet Take 2 Tabs by mouth every six (6) hours as needed for Pain. 50 Tab 0  
 ferrous sulfate (IRON) 325 mg (65 mg iron) tablet Take  by mouth Daily (before breakfast).  diclofenac potassium (CATAFLAM) 50 mg tablet Take 50 mg by mouth two (2) times a day.     
 metoprolol (LOPRESSOR) 25 mg tablet Take 1 Tab by mouth every twelve (12) hours. Indications: HYPERTENSION (Patient taking differently: Take 50 mg by mouth every twelve (12) hours. Indications: hypertension) 60 Tab 2  
 atorvastatin (LIPITOR) 40 mg tablet Take 1 Tab by mouth nightly. 30 Tab 0  
 ticagrelor (BRILINTA) 90 mg tablet Take 1 Tab by mouth two (2) times a day. 60 Tab 2  
 valsartan (DIOVAN) 320 mg tablet Take 0.5 Tabs by mouth daily. Indications: HYPERTENSION 30 Tab 0  
 aspirin delayed-release 81 mg tablet Take 1 Tab by mouth daily. 30 Tab 0  
 nitroglycerin (NITROSTAT) 0.4 mg SL tablet 1 Tab by SubLINGual route every five (5) minutes as needed for Chest Pain. 30 Tab 0  
 polyethylene glycol (MIRALAX) 17 gram packet Take 1 Packet by mouth daily. 30 Packet 0  
 pantoprazole (PROTONIX) 40 mg tablet Take 40 mg by mouth daily. Indications: GASTROESOPHAGEAL REFLUX  ergocalciferol (VITAMIN D2) 50,000 unit capsule Take 50,000 Units by mouth. Past History Past Medical History: 
Past Medical History:  
Diagnosis Date  Coronary artery disease  Degenerative joint disease  Degenerative spine disease  Dyslipidemia  Hypertension  Hypertension  Kidney stone  Osteoarthritis Past Surgical History: 
Past Surgical History:  
Procedure Laterality Date  HX APPENDECTOMY  HX HYSTERECTOMY Family History: 
Family History Problem Relation Age of Onset  Family history unknown: Yes  
 
 
Social History: 
Social History Substance Use Topics  Smoking status: Never Smoker  Smokeless tobacco: Never Used  Alcohol use No  
 
 
Allergies: Allergies Allergen Reactions  Demerol [Meperidine] Other (comments) Oral pain  Erythromycin Other (comments)  Nitrofurantoin Other (comments) Mouth pain  Pcn [Penicillins] Other (comments) Oral pain  Sulfa (Sulfonamide Antibiotics) Other (comments) Oral pain Review of Systems Review of Systems Constitutional: Negative for fever. Respiratory: Negative for cough. Cardiovascular: Positive for palpitations. Negative for chest pain. Gastrointestinal: Negative for abdominal pain and diarrhea. Neurological: Positive for weakness. All other systems reviewed and are negative. Visit Vitals  /66  Pulse 79  Temp 97.9 °F (36.6 °C)  Resp 15  Ht 5' 4\" (1.626 m)  Wt 58.6 kg (129 lb 1.6 oz)  SpO2 97%  BMI 22.16 kg/m2 Physical Exam / Medical Decision Making I am the first provider for this patient. I reviewed the vital signs, available nursing notes, past medical history, past surgical history, family history and social history. Vital Signs-Reviewed the patient's vital signs. Physical exam: 
General: Elderly nontoxic nondiaphoretic, . Head:  Normocephalic atraumatic. Eyes:  Pupils midrange extraocular movements intact. No pallor or conjunctival injection. Nose:  No rhinorrhea, inspection grossly normal.   
Ears:  Grossly normal to inspection, no discharge. Mouth:  Mucous membranes moist, no appreciable intraoral lesion. Neck/Back:  Trachea midline, no asymmetry. Chest:  Grossly normal inspection, symmetric chest rise. Pulmonary:  Clear to auscultation bilaterally no wheezes rhonchi or rales. Cardiovascular:  S1-S2 no murmurs rubs or gallops. Abdomen: Soft, nontender, nondistended no guarding rebound or peritoneal signs. Extremities:  Grossly normal to inspection, peripheral pulses intact  no distal edema Neurologic:  Alert and oriented no appreciable focal neurologic deficit Skin:  Warm and dry Psychiatric:  Grossly normal mood and affect Nursing note reviewed, vital signs reviewed. ED course: 
Patient presents with generalized weakness, rapid atrial fibrillation EMS EKG, no chest pain no shortness breath no palpitations.   She is afebrile tachycardic in the 140s to 150s, blood pressure normal saturation normal 
 
Monitor rapid atrial fibrillation EKG done at 1103 hrs. intrafibrillation heart rate 165 intervals within normal limits, there are ST depressions in V3 V4 V5 and V6 without elevation, likely rate related. Blood pressure normal she was given 10 mg of intravenous diltiazem, will rule out ACS Monitor: Normal sinus rhythm EKG repeated at 1140 hrs. sinus rhythm heart rate 80 [place] interval is prolonged at 232, ST changes have resolved, there are no ST elevations the T-wave inversions. Labs unremarkable Patient reevaluated, vital signs are stable heart rate is in the 80s normal sinus on monitor, blood pressure remains minimally elevated, she feels stills minimally fatigued although improved compared to prior, plan for admission since this is her 1st episode of rapid atrial fibrillation Patient's presentation, history, physical exam and laboratory evaluations were reviewed. I felt the patient would benefit from inpatient management and treatment. Consult:  Discussed care with Margie Jameson. Standard discussion; including history of patients chief complaint, available diagnostic results, and treatment course. Patient was accepted to their service. Disposition: 
 
Admitted to medicine service Portions of this chart were created with Dragon medical speech to text program.   Unrecognized errors may be present. Diagnostic Study Results Labs - Recent Results (from the past 12 hour(s)) EKG, 12 LEAD, INITIAL Collection Time: 09/20/18 11:03 AM  
Result Value Ref Range Ventricular Rate 165 BPM  
 Atrial Rate 187 BPM  
 QRS Duration 80 ms  
 Q-T Interval 270 ms QTC Calculation (Bezet) 447 ms Calculated R Axis 58 degrees Calculated T Axis -126 degrees Diagnosis Atrial fibrillation with rapid ventricular response Marked ST abnormality, possible inferolateral subendocardial injury Abnormal ECG When compared with ECG of 18-DEC-2017 11:19, 
Atrial fibrillation has replaced Sinus rhythm Vent. rate has increased BY  76 BPM 
ST now depressed in Inferior leads ST now depressed in Anterolateral leads T wave inversion now evident in Lateral leads CBC WITH AUTOMATED DIFF Collection Time: 09/20/18 11:12 AM  
Result Value Ref Range WBC 5.0 4.6 - 13.2 K/uL  
 RBC 3.51 (L) 4.20 - 5.30 M/uL  
 HGB 10.6 (L) 12.0 - 16.0 g/dL HCT 32.5 (L) 35.0 - 45.0 % MCV 92.6 74.0 - 97.0 FL  
 MCH 30.2 24.0 - 34.0 PG  
 MCHC 32.6 31.0 - 37.0 g/dL  
 RDW 12.7 11.6 - 14.5 % PLATELET 120 228 - 796 K/uL MPV 9.8 9.2 - 11.8 FL  
 NEUTROPHILS 72 40 - 73 % LYMPHOCYTES 21 21 - 52 % MONOCYTES 6 3 - 10 % EOSINOPHILS 1 0 - 5 % BASOPHILS 0 0 - 2 %  
 ABS. NEUTROPHILS 3.5 1.8 - 8.0 K/UL  
 ABS. LYMPHOCYTES 1.1 0.9 - 3.6 K/UL  
 ABS. MONOCYTES 0.3 0.05 - 1.2 K/UL  
 ABS. EOSINOPHILS 0.1 0.0 - 0.4 K/UL  
 ABS. BASOPHILS 0.0 0.0 - 0.1 K/UL  
 DF AUTOMATED METABOLIC PANEL, BASIC Collection Time: 09/20/18 11:12 AM  
Result Value Ref Range Sodium 141 136 - 145 mmol/L Potassium 4.3 3.5 - 5.5 mmol/L Chloride 106 100 - 108 mmol/L  
 CO2 27 21 - 32 mmol/L Anion gap 8 3.0 - 18 mmol/L Glucose 104 (H) 74 - 99 mg/dL BUN 26 (H) 7.0 - 18 MG/DL Creatinine 1.19 0.6 - 1.3 MG/DL  
 BUN/Creatinine ratio 22 (H) 12 - 20 GFR est AA 51 (L) >60 ml/min/1.73m2 GFR est non-AA 42 (L) >60 ml/min/1.73m2 Calcium 8.9 8.5 - 10.1 MG/DL  
PTT Collection Time: 09/20/18 11:12 AM  
Result Value Ref Range aPTT 26.2 23.0 - 36.4 SEC PROTHROMBIN TIME + INR Collection Time: 09/20/18 11:12 AM  
Result Value Ref Range Prothrombin time 13.2 11.5 - 15.2 sec INR 1.0 0.8 - 1.2    
TROPONIN I Collection Time: 09/20/18 11:12 AM  
Result Value Ref Range Troponin-I, Qt. <0.02 0.0 - 0.045 NG/ML  
EKG, 12 LEAD, SUBSEQUENT Collection Time: 09/20/18 11:40 AM  
Result Value Ref Range Ventricular Rate 80 BPM  
 Atrial Rate 80 BPM  
 P-R Interval 232 ms QRS Duration 84 ms Q-T Interval 368 ms QTC Calculation (Bezet) 424 ms Calculated P Axis 68 degrees Calculated R Axis 47 degrees Calculated T Axis 48 degrees Diagnosis Sinus rhythm with 1st degree AV block with occasional premature ventricular  
complexes Otherwise normal ECG When compared with ECG of 20-SEP-2018 11:03, Sinus rhythm has replaced Atrial fibrillation Vent. rate has decreased BY  85 BPM 
ST no longer depressed in Inferior leads ST no longer depressed in Anterolateral leads Nonspecific T wave abnormality has replaced inverted T waves in Inferior  
leads T wave inversion no longer evident in Lateral leads Radiologic Studies -  
XR CHEST PORT    (Results Pending) Diagnosis Clinical Impression: 1. New onset atrial fibrillation ([address] 2. Elevated blood pressure reading Follow-up Information None Patient's Medications Start Taking No medications on file Continue Taking ACETAMINOPHEN (TYLENOL EXTRA STRENGTH) 500 MG TABLET    Take 2 Tabs by mouth every six (6) hours as needed for Pain. ASPIRIN DELAYED-RELEASE 81 MG TABLET    Take 1 Tab by mouth daily. ATORVASTATIN (LIPITOR) 40 MG TABLET    Take 1 Tab by mouth nightly. DICLOFENAC POTASSIUM (CATAFLAM) 50 MG TABLET    Take 50 mg by mouth two (2) times a day. ERGOCALCIFEROL (VITAMIN D2) 50,000 UNIT CAPSULE    Take 50,000 Units by mouth. FERROUS SULFATE (IRON) 325 MG (65 MG IRON) TABLET    Take  by mouth Daily (before breakfast). METOPROLOL (LOPRESSOR) 25 MG TABLET    Take 1 Tab by mouth every twelve (12) hours. Indications: HYPERTENSION  
 NITROGLYCERIN (NITROSTAT) 0.4 MG SL TABLET    1 Tab by SubLINGual route every five (5) minutes as needed for Chest Pain. PANTOPRAZOLE (PROTONIX) 40 MG TABLET    Take 40 mg by mouth daily. Indications: GASTROESOPHAGEAL REFLUX POLYETHYLENE GLYCOL (MIRALAX) 17 GRAM PACKET    Take 1 Packet by mouth daily. TICAGRELOR (BRILINTA) 90 MG TABLET    Take 1 Tab by mouth two (2) times a day. TRAMADOL (ULTRAM) 50 MG TABLET    Take 1 Tab by mouth every eight (8) hours as needed for Pain. Max Daily Amount: 150 mg. VALSARTAN (DIOVAN) 320 MG TABLET    Take 0.5 Tabs by mouth daily. Indications: HYPERTENSION These Medications have changed No medications on file Stop Taking No medications on file  
 
_______________________________ Attestations: 
Scribe Attestation Ambar Roth MD acting as a scribe for and in the presence of Ambar Roth MD     
September 20, 2018 at 12:43 PM 
    
Provider Attestation:     
I personally performed the services described in the documentation, reviewed the documentation, as recorded by the scribe in my presence, and it accurately and completely records my words and actions. September 20, 2018 at 12:43 PM - Ambar Roth MD   
_______________________________

## 2018-09-21 PROBLEM — E78.00 HYPERCHOLESTEREMIA: Status: ACTIVE | Noted: 2018-09-21

## 2018-09-21 PROBLEM — I10 HTN (HYPERTENSION): Status: ACTIVE | Noted: 2018-09-21

## 2018-09-21 PROBLEM — K12.30 STOMATITIS AND MUCOSITIS: Status: ACTIVE | Noted: 2018-09-21

## 2018-09-21 PROBLEM — K21.9 GERD (GASTROESOPHAGEAL REFLUX DISEASE): Status: ACTIVE | Noted: 2018-09-21

## 2018-09-21 PROBLEM — K12.1 STOMATITIS AND MUCOSITIS: Status: ACTIVE | Noted: 2018-09-21

## 2018-09-21 PROBLEM — M54.50 RIGHT LOW BACK PAIN: Status: RESOLVED | Noted: 2018-04-12 | Resolved: 2018-09-21

## 2018-09-21 PROBLEM — I25.10 CAD (CORONARY ARTERY DISEASE): Status: ACTIVE | Noted: 2018-09-21

## 2018-09-21 LAB
ATRIAL RATE: 187 BPM
ATRIAL RATE: 80 BPM
CALCULATED P AXIS, ECG09: 68 DEGREES
CALCULATED R AXIS, ECG10: 47 DEGREES
CALCULATED R AXIS, ECG10: 58 DEGREES
CALCULATED T AXIS, ECG11: -126 DEGREES
CALCULATED T AXIS, ECG11: 48 DEGREES
DIAGNOSIS, 93000: NORMAL
DIAGNOSIS, 93000: NORMAL
ECHO EST RA PRESSURE: 10 MMHG
ECHO LV EDV A2C: 85.3 ML
ECHO LV EDV A4C: 53.1 ML
ECHO LV EDV BP: 70.8 ML (ref 56–104)
ECHO LV EDV INDEX A4C: 32.7 ML/M2
ECHO LV EDV INDEX BP: 43.6 ML/M2
ECHO LV EDV NDEX A2C: 52.5 ML/M2
ECHO LV EJECTION FRACTION A2C: 68 %
ECHO LV EJECTION FRACTION A4C: 65 %
ECHO LV EJECTION FRACTION BIPLANE: 67.9 % (ref 55–100)
ECHO LV ESV A2C: 27.6 ML
ECHO LV ESV A4C: 18.6 ML
ECHO LV ESV BP: 22.7 ML (ref 19–49)
ECHO LV ESV INDEX A2C: 17 ML/M2
ECHO LV ESV INDEX A4C: 11.5 ML/M2
ECHO LV ESV INDEX BP: 14 ML/M2
ECHO LV INTERNAL DIMENSION DIASTOLIC: 3.73 CM (ref 3.9–5.3)
ECHO LV INTERNAL DIMENSION SYSTOLIC: 2.82 CM
ECHO LV IVSD: 1.07 CM (ref 0.6–0.9)
ECHO LV MASS 2D: 155.7 G (ref 67–162)
ECHO LV MASS INDEX 2D: 95.9 G/M2
ECHO LV POSTERIOR WALL DIASTOLIC: 1.19 CM (ref 0.6–0.9)
ECHO LVOT DIAM: 2.01 CM
ECHO MV A VELOCITY: 74.67 CM/S
ECHO MV AREA PHT: 5.9 CM2
ECHO MV E DECELERATION TIME (DT): 128.4 MS
ECHO MV E VELOCITY: 1.14 CM/S
ECHO MV E/A RATIO: 0.02
ECHO MV PRESSURE HALF TIME (PHT): 37.2 MS
ECHO PULMONARY ARTERY SYSTOLIC PRESSURE (PASP): 13.4 MMHG
ECHO PVEIN A VELOCITY: 15.3 CM/S
ECHO RIGHT VENTRICULAR SYSTOLIC PRESSURE (RVSP): 13.4 MMHG
ECHO TV REGURGITANT MAX VELOCITY: -91.79 CM/S
ECHO TV REGURGITANT PEAK GRADIENT: 3.4 MMHG
P-R INTERVAL, ECG05: 232 MS
Q-T INTERVAL, ECG07: 270 MS
Q-T INTERVAL, ECG07: 368 MS
QRS DURATION, ECG06: 80 MS
QRS DURATION, ECG06: 84 MS
QTC CALCULATION (BEZET), ECG08: 424 MS
QTC CALCULATION (BEZET), ECG08: 447 MS
VENTRICULAR RATE, ECG03: 165 BPM
VENTRICULAR RATE, ECG03: 80 BPM

## 2018-09-21 PROCEDURE — 74011250637 HC RX REV CODE- 250/637: Performed by: INTERNAL MEDICINE

## 2018-09-21 PROCEDURE — 65660000000 HC RM CCU STEPDOWN

## 2018-09-21 RX ORDER — DILTIAZEM HYDROCHLORIDE 180 MG/1
180 CAPSULE, COATED, EXTENDED RELEASE ORAL DAILY
Status: DISCONTINUED | OUTPATIENT
Start: 2018-09-22 | End: 2018-09-24 | Stop reason: HOSPADM

## 2018-09-21 RX ADMIN — ATORVASTATIN CALCIUM 40 MG: 40 TABLET, FILM COATED ORAL at 21:26

## 2018-09-21 RX ADMIN — DILTIAZEM HYDROCHLORIDE 60 MG: 60 TABLET, FILM COATED ORAL at 14:00

## 2018-09-21 RX ADMIN — METOPROLOL TARTRATE 25 MG: 25 TABLET ORAL at 22:51

## 2018-09-21 RX ADMIN — APIXABAN 2.5 MG: 2.5 TABLET, FILM COATED ORAL at 08:28

## 2018-09-21 RX ADMIN — DILTIAZEM HYDROCHLORIDE 60 MG: 60 TABLET, FILM COATED ORAL at 17:47

## 2018-09-21 RX ADMIN — APIXABAN 2.5 MG: 2.5 TABLET, FILM COATED ORAL at 17:47

## 2018-09-21 RX ADMIN — METOPROLOL TARTRATE 25 MG: 25 TABLET ORAL at 08:28

## 2018-09-21 RX ADMIN — PANTOPRAZOLE SODIUM 40 MG: 40 TABLET, DELAYED RELEASE ORAL at 08:27

## 2018-09-21 RX ADMIN — DILTIAZEM HYDROCHLORIDE 60 MG: 60 TABLET, FILM COATED ORAL at 08:28

## 2018-09-21 NOTE — PROGRESS NOTES
Nutrition initial assessment/Plan of care RECOMMENDATIONS:  
 
1. Regular diet 2. Monitor weight, labs and PO intake 3. RD to follow GOALS:  
 
1. PO intake meets >75% of protein/calorie needs by 9/26 2. Weight Maintenance (+/- 1-2 lb by 9/26) ASSESSMENT:  
 
Weight status is classified as normal per BMI of 22.1 However, patient is at nutrition risk due to BMI below 23 with patient above 72years of age. PO intake is adequate. Labs noted. Nutrition recommendations listed. RD to follow. Nutrition Diagnoses: No nutrition diagnosis at this time. Nutrition Risk:  [] High  [] Moderate [x]  Low SUBJECTIVE/OBJECTIVE:  
  
Admitted with atrial fibrillation. Patient reports having a good appetite and eating all of meals. Observed 100% intake of lunch meal during visit. States weight of 145 lb back in 1992 but weight has recently been stable at 125 lb for several years. Denies food allergies and problems with chewing/swallowing. Will monitor. Information Obtained from:  
 [x] Chart Review [x] Patient 
 [] Family/Caregiver 
 [] Nurse/Physician 
 [] Interdisciplinary Meeting/Rounds Diet: Regular diet Medications: [x] Reviewed Allergies: [x] Reviewed Encounter Diagnoses ICD-10-CM ICD-9-CM 1. [facility] I48.91 427.31  
2. Elevated blood pressure reading R03.0 796.2 Past Medical History:  
Diagnosis Date  Coronary artery disease  Degenerative joint disease  Degenerative spine disease  Dyslipidemia  Hypertension  Hypertension  Kidney stone  Osteoarthritis Labs:  Lab Results Component Value Date/Time  Sodium 141 09/20/2018 11:12 AM  
 Potassium 4.3 09/20/2018 11:12 AM  
 Chloride 106 09/20/2018 11:12 AM  
 CO2 27 09/20/2018 11:12 AM  
 Anion gap 8 09/20/2018 11:12 AM  
 Glucose 104 (H) 09/20/2018 11:12 AM  
 BUN 26 (H) 09/20/2018 11:12 AM  
 Creatinine 1.19 09/20/2018 11:12 AM  
 Calcium 8.9 09/20/2018 11:12 AM  
 Magnesium 1.9 09/03/2011 08:15 PM  
 Albumin 3.7 04/09/2018 06:24 PM  
 
Anthropometrics: BMI (calculated): 22.1 Last 3 Recorded Weights in this Encounter  
 09/20/18 1106 09/20/18 1600 09/21/18 0429 Weight: 58.6 kg (129 lb 1.6 oz) 58.5 kg (129 lb) 58.5 kg (129 lb) Ht Readings from Last 1 Encounters:  
09/20/18 5' 4\" (1.626 m) Patient Vitals for the past 100 hrs: 
 % Diet Eaten  
09/21/18 1330 100 % 09/21/18 0955 50 % 09/20/18 1837 85 %  
09/20/18 1504 100 % IBW: 120 lb %IBW: 107% UBW: 125 lb %UBW: 103% [] Weight Loss [] Weight Gain [x] Weight Stable Estimated Nutrition Needs: [x] MSJ Calories: 2177-3251 Kcal Based on:   [x] Actual BW   
Protein:   60-70 g Based on:   [x] Actual BW Fluid:       1500 ml Based on:   [x] Actual BW  
 
 [x] No Cultural, Taoism or ethnic dietary need identified. [] Cultural, Taoism and ethnic food preferences identified and addressed Wt Status:  [x] Normal (18.6 - 24.9) [] Underweight (<18.5) [] Overweight (25 - 29.9) [] Mild Obesity (30 - 34.9)  [] Moderate Obesity (35 - 39.9) [] Morbid Obesity (40+) Nutrition Problems Identified:  
[] Suboptimal PO intake  
[] Food Allergies [] Difficulty chewing/swallowing/poor dentition 
[] Constipation/Diarrhea  
[] Nausea/Vomiting  
[x] None 
[] Other:  
 
Plan:  
[] Therapeutic Diet 
[]  Obtained/adjusted food preferences/tolerances and/or snacks options  
[]  Supplements added  
[] Occupational therapy following for feeding techniques []  HS snack added  
[]  Modify diet texture  
[]  Modify diet for food allergies []  Assist with menu selection  
[x]  Monitor PO intake on meal rounds  
[x]  Continue inpatient monitoring and intervention  
[]  Participated in discharge planning/Interdisciplinary rounds/Team meetings  
[]  Other:  
 
Education Needs: 
 [] Not appropriate for teaching at this time due to: 
 [x] Identified and addressed Nutrition Monitoring and Evaluation: [x] Continue ongoing monitoring and intervention 
[] Other [address]

## 2018-09-21 NOTE — PROGRESS NOTES
General Internal Medicine/Geriatrics Patient: Alyse Howard MRN: [id number]  CSN: [id number] YOB: 1923  Age: 80 y.o. Sex: female DOA: 9/20/2018 LOS:  LOS: 1 day Subjective:  
 
Mouth sore No CP, SOB Weak Usual arthralgia Review of Systems: 
Eyes: negative Ears, Nose, Mouth, Throat, and Face: negative Respiratory: negative for dyspnea on exertion Cardiovascular: negative for chest pain Gastrointestinal: negative for nausea, vomiting and diarrhea Genitourinary:negative for dysuria and hematuria Integument/Breast: negative Hematologic/Lymphatic: negative for bleeding Musculoskeletal:negative for arthralgias Neurological: negative for weakness Behavioral/Psychiatric: negative for behavior problems Endocrine: negative for temperature intolerance Allergic/Immunologic: negative for hay fever Objective:  
 
Physical Exam: 
Patient Vitals for the past 24 hrs: 
 Temp Pulse Resp BP SpO2  
09/21/18 0915 - - - 128/62 -  
09/21/18 0817 98.2 °F (36.8 °C) 68 18 190/75 96 %  
09/21/18 0429 98.2 °F (36.8 °C) 65 18 168/73 96 %  
09/20/18 2008 98.5 °F (36.9 °C) 80 18 156/67 97 % 09/20/18 1900 - - - 151/64 -  
09/20/18 1833 - - - 177/83 -  
09/20/18 1832 - - - 173/76 -  
09/20/18 1628 98.6 °F (37 °C) 78 18 173/71 96 %  
09/20/18 1600 - - - 175/89 -  
09/20/18 1400 98 °F (36.7 °C) - 18 175/89 97 % 09/20/18 1300 - 82 18 189/76 98 %  
09/20/18 1250 - 82 19 197/76 96 %  
09/20/18 1240 - 79 14 191/70 98 %  
09/20/18 1230 - 79 17 178/67 99 % 09/20/18 1220 - 81 14 (!) 180/103 98 %  
09/20/18 1210 - 76 14 158/75 99 % 09/20/18 1200 - 92 20 175/60 97 % 09/20/18 1150 - 79 15 160/66 97 % 09/20/18 1140 - 80 18 158/65 98 %  
09/20/18 1135 - 80 15 - 97 % 09/20/18 1126 97.9 °F (36.6 °C) - - - -  
09/20/18 1125 - 88 16 - 98 %  
09/20/18 1120 - (!) 106 17 149/64 97 % 09/20/18 1119 - (!) 142 - (!) 176/141 -  
09/20/18 1115 - (!) 144 15 (!) 178/143 98 % 09/20/18 1110 - (!) 151 15 (!) 176/141 97 % 09/20/18 1106 - (!) 160 16 (!) 160/130 98 %  
09/20/18 1105 - (!) 158 14 - 98 %  
09/20/18 1100 - (!) 161 24 (!) 160/130 99 % 09/20/18 1055 - (!) 160 20 - -  
 
AF 
BP improved Tele, SR 
 
 
HEENT: wnl, mild stomatitis NECK:  No venous distention or adenopathy HEART: RRR, no rubs or gallops LUNGS: Clear to auscultation ABDOMEN: soft with active BS. No tenderness, no distention, no organomegaly EXT: warm,no edema SKIN: Normal turgor, no breaks NEURO: Awake, alert, non focal 
 
Intake and Output: 
Current Shift:    
Last three shifts:  09/19 1901 - 09/21 0700 In: 480 [P.O.:480] Out: 200 [Urine:200] Recent Results (from the past 24 hour(s)) EKG, 12 LEAD, INITIAL Collection Time: 09/20/18 11:03 AM  
Result Value Ref Range Ventricular Rate 165 BPM  
 Atrial Rate 187 BPM  
 QRS Duration 80 ms  
 Q-T Interval 270 ms QTC Calculation (Bezet) 447 ms Calculated R Axis 58 degrees Calculated T Axis -126 degrees Diagnosis Atrial fibrillation with rapid ventricular response Marked ST abnormality, possible inferolateral subendocardial injury Abnormal ECG When compared with ECG of 18-DEC-2017 11:19, 
Atrial fibrillation has replaced Sinus rhythm Vent. rate has increased BY  76 BPM 
ST now depressed in Inferior leads ST now depressed in Anterolateral leads T wave inversion now evident in Lateral leads Confirmed by Philip Russo ([id number]) on 9/21/2018 9:39:35 AM 
  
CBC WITH AUTOMATED DIFF Collection Time: 09/20/18 11:12 AM  
Result Value Ref Range WBC 5.0 4.6 - 13.2 K/uL  
 RBC 3.51 (L) 4.20 - 5.30 M/uL  
 HGB 10.6 (L) 12.0 - 16.0 g/dL HCT 32.5 (L) 35.0 - 45.0 % MCV 92.6 74.0 - 97.0 FL  
 MCH 30.2 24.0 - 34.0 PG  
 MCHC 32.6 31.0 - 37.0 g/dL  
 RDW 12.7 11.6 - 14.5 % PLATELET 543 927 - 432 K/uL MPV 9.8 9.2 - 11.8 FL  
 NEUTROPHILS 72 40 - 73 % LYMPHOCYTES 21 21 - 52 % MONOCYTES 6 3 - 10 % EOSINOPHILS 1 0 - 5 % BASOPHILS 0 0 - 2 %  
 ABS. NEUTROPHILS 3.5 1.8 - 8.0 K/UL  
 ABS. LYMPHOCYTES 1.1 0.9 - 3.6 K/UL  
 ABS. MONOCYTES 0.3 0.05 - 1.2 K/UL  
 ABS. EOSINOPHILS 0.1 0.0 - 0.4 K/UL  
 ABS. BASOPHILS 0.0 0.0 - 0.1 K/UL  
 DF AUTOMATED METABOLIC PANEL, BASIC Collection Time: 09/20/18 11:12 AM  
Result Value Ref Range Sodium 141 136 - 145 mmol/L Potassium 4.3 3.5 - 5.5 mmol/L Chloride 106 100 - 108 mmol/L  
 CO2 27 21 - 32 mmol/L Anion gap 8 3.0 - 18 mmol/L Glucose 104 (H) 74 - 99 mg/dL BUN 26 (H) 7.0 - 18 MG/DL Creatinine 1.19 0.6 - 1.3 MG/DL  
 BUN/Creatinine ratio 22 (H) 12 - 20 GFR est AA 51 (L) >60 ml/min/1.73m2 GFR est non-AA 42 (L) >60 ml/min/1.73m2 Calcium 8.9 8.5 - 10.1 MG/DL  
PTT Collection Time: 09/20/18 11:12 AM  
Result Value Ref Range aPTT 26.2 23.0 - 36.4 SEC PROTHROMBIN TIME + INR Collection Time: 09/20/18 11:12 AM  
Result Value Ref Range Prothrombin time 13.2 11.5 - 15.2 sec INR 1.0 0.8 - 1.2    
TROPONIN I Collection Time: 09/20/18 11:12 AM  
Result Value Ref Range Troponin-I, Qt. <0.02 0.0 - 0.045 NG/ML  
TSH 3RD GENERATION Collection Time: 09/20/18 11:12 AM  
Result Value Ref Range TSH 0.39 0.36 - 3.74 uIU/mL EKG, 12 LEAD, SUBSEQUENT Collection Time: 09/20/18 11:40 AM  
Result Value Ref Range Ventricular Rate 80 BPM  
 Atrial Rate 80 BPM  
 P-R Interval 232 ms QRS Duration 84 ms Q-T Interval 368 ms QTC Calculation (Bezet) 424 ms Calculated P Axis 68 degrees Calculated R Axis 47 degrees Calculated T Axis 48 degrees Diagnosis Sinus rhythm with 1st degree AV block with occasional premature ventricular  
complexes Otherwise normal ECG When compared with ECG of 20-SEP-2018 11:03, Sinus rhythm has replaced Atrial fibrillation Vent. rate has decreased BY  85 BPM 
ST no longer depressed in Inferior leads ST no longer depressed in Anterolateral leads Nonspecific T wave abnormality has replaced inverted T waves in Inferior  
leads T wave inversion no longer evident in Lateral leads Confirmed by Karie Art ([id number]) on 9/21/2018 9:40:10 AM 
  
ECHO ADULT FOLLOW-UP OR LIMITED Collection Time: 09/20/18  4:30 PM  
Result Value Ref Range LVIDd 3.73 (A) 3.9 - 5.3 cm  
 LVPWd 1.19 (A) 0.6 - 0.9 cm LVIDs 2.82 cm IVSd 1.07 (A) 0.6 - 0.9 cm  
 LV ED Vol A2C 85.3 mL  
 LV ES Vol A4C 18.6 mL  
 LV ES Vol BP 22.7 19 - 49 mL  
 LVOT d 2.01 cm  
 MVA (PHT) 5.9 cm2  
 MV A Mayank 74.67 cm/s  
 MV E Mayank 1.14 cm/s  
 MV E/A 0.02   
 BP EF 67.9 55 - 100 % LV Ejection Fraction MOD 4C 65 % LV Ejection Fraction MOD 2C 68 % LV Mass .7 67 - 162 g  
 LV Mass AL Index 95.9 g/m2 RVSP 13.4 mmHg LV ES Vol A2C 27.6 mL  
 LVES Vol Index BP 14.0 mL/m2 LV ED Vol A4C 53.1 mL  
 LVED Vol Index BP 43.6 mL/m2 Est. RA Pressure 10.0 mmHg Mitral Valve E Wave Deceleration Time 128.4 ms Mitral Valve Pressure Half-time 37.2 ms Triscuspid Valve Regurgitation Peak Gradient 3.4 mmHg Pulmonary Vein \"A\" Wave Velocity 15.30 cm/s LV ED Vol BP 70.8 56 - 104 ml  
 TR Max Velocity -91.79 cm/s PASP 13.4 mmHg LVED Vol Index A4C 32.7 mL/m2 LVED Vol Index A2C 52.5 mL/m2 LVES Vol Index A4C 11.5 mL/m2 LVES Vol Index A2C 17.0 mL/m2 CARDIAC PANEL,(CK, CKMB & TROPONIN) Collection Time: 09/20/18  8:36 PM  
Result Value Ref Range CK 87 26 - 192 U/L  
 CK - MB 2.2 <3.6 ng/ml CK-MB Index 2.5 0.0 - 4.0 % Troponin-I, Qt. 0.10 (H) 0.0 - 0.045 NG/ML Xr Chest [facility] Result Date: 9/20/2018 HISTORY: Weakness, dizziness, chest pain Comparison 12/18/2017 FINDINGS: AP portable erect chest obtained. Mild calcification aortic arch with heart size upper limits of normal stable. The lungs remain clear with no new mass consolidation or pleural effusion.  Multilevel degenerative thoracic spondylosis and severe arthritic changes bilateral shoulders with prominent calcification along the course of the distal rotator cuff suggesting calcific tendinitis. IMPRESSION: No evidence of active cardiopulmonary disease or significant interval change. Current Facility-Administered Medications Medication Dose Route Frequency  dilTIAZem (CARDIZEM) IR tablet 60 mg  60 mg Oral TIDAC  metoprolol tartrate (LOPRESSOR) tablet 25 mg  25 mg Oral Q12H  
 apixaban (ELIQUIS) tablet 2.5 mg  2.5 mg Oral BID  atorvastatin (LIPITOR) tablet 40 mg  40 mg Oral QHS  pantoprazole (PROTONIX) tablet 40 mg  40 mg Oral ACB  polyethylene glycol (MIRALAX) packet 17 g  17 g Oral DAILY  hydrALAZINE (APRESOLINE) 20 mg/mL injection 10 mg  10 mg IntraVENous Q6H PRN Lab Results Component Value Date/Time Glucose 104 (H) 09/20/2018 11:12 AM  
 Glucose 91 04/09/2018 06:24 PM  
 Glucose 100 (H) 12/18/2017 11:30 AM  
 Glucose 87 03/15/2017 04:20 PM  
 Glucose 97 08/02/2015 09:58 AM  
  
 
Assessment Active Problems: 
  New onset atrial fibrillation ([address] (9/20/2018) HTN (hypertension) (9/21/2018) CAD (coronary artery disease) (9/21/2018) Hypercholesteremia (9/21/2018) GERD (gastroesophageal reflux disease) (9/21/2018) Stomatitis and mucositis (9/21/2018) Plan Echo 
Clak's sol LA cardizem Mobilize Same meds including pennyqumanish Bernabe MD 
9/21/2018, 10:46 AM

## 2018-09-21 NOTE — PROGRESS NOTES
Assumed care; Pt resting in bed; no distress noted; Pt denies pain; bed in low position; Side rails up x 3; Call light and personal belonging within reach. Will continue to monitor. 2030: Pt complains of frequency in urination at bedtime. Bedside commode placed at bedside for pt safety. Gripper Socks and call light at bedside. Pt directed on use of both assistive devices.

## 2018-09-21 NOTE — ROUTINE PROCESS
Bedside and Verbal shift change report given to [address] (oncoming nurse) by Dakota Mueller RN (offgoing nurse). Report included the following information SBAR, Kardex and MAR.

## 2018-09-21 NOTE — MANAGEMENT PLAN
Discharge/Transition Planning Care Management following. Plan [facility] for SNF rehab if accepted [facility]. Mateo Martin RN BSN Outcomes Manager Pager # [phone]

## 2018-09-21 NOTE — PROGRESS NOTES
07: 25--Bedside report received from previous nurse, Lynette LUIS. Patient awake- No c/o pain/discomfort at this time. Call light in reach. 08:20-- B/P= 190/75-- Heart rhythm sinus rhythm -Tele tech reports that patient has been sinus rhythm all night. Med with scheduled lopressor 09:15- B/P recheck= 128/62   No c/o at this time. Call light in reach. Continue to monitor. Hourly Rounding. 11:08- No change in condition. 14:00-- Condi;tion unchanged-- continue in sinus rhythm. Continue to monitor. 19:45- Report to oncoming nurse given.

## 2018-09-21 NOTE — H&P
[facility] DIVISION HISTORY AND PHYSICAL Ketty Estrada 
MR#: [id number] : 1923 ACCOUNT #: [de-identified] ADMIT DATE: 2018 CHIEF COMPLAINT:  Palpitations, shortness of breath and dizziness. HISTORY OF PRESENT ILLNESS:  The patient is a very pleasant 42-year-old white female who lives alone in a relatively independent state. Patient today experienced rather sudden onset of rapid heart palpitations, shortness of breath, dizziness and sweating. She called the paramedics and was transferred to the ER where she was found to be in new-onset atrial fibrillation with rapid ventricular response. She was given an injection of IV Cardizem and she converted to sinus rhythm. Patient's vitals stabilized and she was referred for admission for further evaluation and management. The patient actually describes some intermittent episodes in the last few weeks of feeling weak, dizzy, etc.  She also had some episodes of shortness of breath. PAST MEDICAL HISTORY: 
1. Hypertension. 2.  Hypercholesterolemia. 3.  Osteoarthritis. 4.  Spinal stenosis and sciatica, status post epidural injections in the past. 
5.  Mild chronic anemia. 6.  Osteopenia. 7.  Gastroesophageal reflux disease. 8.  Coronary artery disease status post myocardial infarction in  with right coronary artery angioplasty and residual left anterior descending lesion on medical therapy. PAST SURGICAL HISTORY:  Partial hysterectomy and appendectomy. ALLERGIES:  SHE IS ALLERGIC TO PENICILLIN, SULFA AND NITROFURANTOIN. REGULAR MEDICATIONS: 
1. Diovan 320 mg daily. 2.  Protonix 40 mg daily. 3.  Vitamin D 50,000 units every 2 weeks. 4.  Metoprolol 25 mg 3 times daily or 37.5 mg twice a day. 5.  MiraLax daily. 6.  Tylenol as needed. 7.  Lipitor 40 mg daily. 8.  Aspirin 81 mg a day. 9.  Sublingual nitroglycerin as needed. 10.  Ferrous sulfate 325 mg daily. 11.  Norco occasionally as needed for pain. SOCIAL HISTORY:  She is . She has 3 sons, but she lives alone. She is a nonsmoker, nondrinker. FAMILY HISTORY:  Mother had diabetes and cancer and father also had cancer. REVIEW OF SYSTEMS:  Patient denies fever, chills or cough. No neurologic symptoms. No abdominal pain, nausea, vomiting. No change in bowel habit. No dysuria, polyuria, hematuria. System review otherwise as per history of the present illness. PHYSICAL EXAMINATION: 
GENERAL:  Pleasant female in no acute distress. VITAL SIGNS:  Temperature 98, pulse 82, respirations 18, blood pressure 189/76. HEENT:  Atraumatic, normocephalic. Sclerae are anicteric. NECK:  No venous distention, adenopathy or thyromegaly. HEART:  Regular rhythm. No rubs or gallops appreciated. ABDOMEN:  Soft, nontender, nondistended. EXTREMITIES:  No cyanosis or edema. NEUROLOGIC:  She is pleasant, oriented x 3, nonfocal. 
 
LABORATORY DATA:  CBC:  WBC 5.0, H and H 10.6 and 32.5. Urinalysis pending. Chemistry slightly elevated blood sugar 104, BUN 26, otherwise within normal range. TSH 0.39 -- normal.  EKG:  Initially atrial fibrillation with rapid ventricular response subsequently sinus rhythm with first degree AV block. Troponin initially negative 0.02. Chest x-ray, no acute disease. ASSESSMENT: 
1. Atrial fibrillation with rapid ventricular response. 2.  History of coronary artery disease, myocardial infarction, status post angioplasty. 3.  Hypertension. 4.  Hypercholesterolemia. 5.  As per past medical history. PLAN:  Patient will be admitted to telemetry for monitoring. Patient will be started on Cardizem in addition to her Lopressor. We will start her also on Eliquis. Echocardiogram will be done. Resume usual medications. Repeat cardiac enzymes. Further management accordingly. MD NKECHI Bear/[place] 
D: 09/20/2018 20:09    
T: 09/20/2018 20:59 
JOB #: [id number]

## 2018-09-22 PROCEDURE — 74011250637 HC RX REV CODE- 250/637: Performed by: INTERNAL MEDICINE

## 2018-09-22 PROCEDURE — 74011250636 HC RX REV CODE- 250/636: Performed by: INTERNAL MEDICINE

## 2018-09-22 PROCEDURE — 65660000000 HC RM CCU STEPDOWN

## 2018-09-22 PROCEDURE — 97162 PT EVAL MOD COMPLEX 30 MIN: CPT

## 2018-09-22 PROCEDURE — 97530 THERAPEUTIC ACTIVITIES: CPT

## 2018-09-22 RX ADMIN — METOPROLOL TARTRATE 25 MG: 25 TABLET ORAL at 08:53

## 2018-09-22 RX ADMIN — PANTOPRAZOLE SODIUM 40 MG: 40 TABLET, DELAYED RELEASE ORAL at 08:53

## 2018-09-22 RX ADMIN — ATORVASTATIN CALCIUM 40 MG: 40 TABLET, FILM COATED ORAL at 21:23

## 2018-09-22 RX ADMIN — DILTIAZEM HYDROCHLORIDE 180 MG: 180 CAPSULE, EXTENDED RELEASE ORAL at 08:53

## 2018-09-22 RX ADMIN — APIXABAN 2.5 MG: 2.5 TABLET, FILM COATED ORAL at 08:53

## 2018-09-22 RX ADMIN — METOPROLOL TARTRATE 25 MG: 25 TABLET ORAL at 21:16

## 2018-09-22 RX ADMIN — HYDRALAZINE HYDROCHLORIDE 10 MG: 20 INJECTION INTRAMUSCULAR; INTRAVENOUS at 09:20

## 2018-09-22 RX ADMIN — APIXABAN 2.5 MG: 2.5 TABLET, FILM COATED ORAL at 17:58

## 2018-09-22 RX ADMIN — HYDROCORTISONE 5 ML: 10 TABLET ORAL at 18:38

## 2018-09-22 NOTE — PROGRESS NOTES
Problem: Falls - Risk of 
Goal: *Absence of Falls Document Miguelito Melendrez Fall Risk and appropriate interventions in the flowsheet. Outcome: Progressing Towards Goal 
Fall Risk Interventions: 
Mobility Interventions: Patient to call before getting OOB Medication Interventions: Teach patient to arise slowly Elimination Interventions: Call light in reach

## 2018-09-22 NOTE — PROGRESS NOTES
General Internal Medicine/Geriatrics Patient: Osvaldo Garcia MRN: [id number]  CSN: [id number] YOB: 1923  Age: 80 y.o. Sex: female DOA: 9/20/2018 LOS:  LOS: 2 days Subjective:  
 
Mouth better No CP, SOB Weak Usual arthralgia Review of Systems: 
Eyes: negative Ears, Nose, Mouth, Throat, and Face: negative Respiratory: negative for dyspnea on exertion Cardiovascular: negative for chest pain Gastrointestinal: negative for nausea, vomiting and diarrhea Genitourinary:negative for dysuria and hematuria Integument/Breast: negative Hematologic/Lymphatic: negative for bleeding Musculoskeletal:negative for arthralgias Neurological: negative for weakness Behavioral/Psychiatric: negative for behavior problems Endocrine: negative for temperature intolerance Allergic/Immunologic: negative for hay fever Objective:  
 
Physical Exam: 
Patient Vitals for the past 24 hrs: 
 Temp Pulse Resp BP SpO2  
09/22/18 1524 98.2 °F (36.8 °C) 66 20 125/62 96 %  
09/22/18 1255 98.3 °F (36.8 °C) 69 20 133/71 96 %  
09/22/18 0830 97.2 °F (36.2 °C) 65 20 196/76 97 % 09/22/18 0419 98.2 °F (36.8 °C) 69 20 168/73 99 % 09/22/18 0016 98 °F (36.7 °C) 66 18 154/62 96 %  
09/21/18 2251 - 64 - 160/68 -  
09/21/18 2129 - - - - 96 %  
09/21/18 2124 - (!) 58 - 145/71 -  
09/21/18 2001 97.9 °F (36.6 °C) (!) 57 16 133/50 97 % 09/21/18 1615 97.5 °F (36.4 °C) 63 15 174/80 95 % AF 
BP improved Tele, SR 
 
 
HEENT: wnl, mild stomatitis NECK:  No venous distention or adenopathy HEART: RRR, no rubs or gallops LUNGS: Clear to auscultation ABDOMEN: soft with active BS. No tenderness, no distention, no organomegaly EXT: warm,no edema SKIN: Normal turgor, no breaks NEURO: Awake, alert, non focal 
 
Intake and Output: 
Current Shift:  09/22 0701 - 09/22 1900 In: 240 [P.O.:240] Out: - Last three shifts:  09/20 1901 - 09/22 0700 In: 480 [P.O.:480] Out: 200 [Urine:200] No results found for this or any previous visit (from the past 24 hour(s)). No results found. Current Facility-Administered Medications Medication Dose Route Frequency  dilTIAZem CD (CARDIZEM CD) capsule 180 mg  180 mg Oral DAILY  metoprolol tartrate (LOPRESSOR) tablet 25 mg  25 mg Oral Q12H  
 apixaban (ELIQUIS) tablet 2.5 mg  2.5 mg Oral BID  atorvastatin (LIPITOR) tablet 40 mg  40 mg Oral QHS  pantoprazole (PROTONIX) tablet 40 mg  40 mg Oral ACB  polyethylene glycol (MIRALAX) packet 17 g  17 g Oral DAILY  hydrALAZINE (APRESOLINE) 20 mg/mL injection 10 mg  10 mg IntraVENous Q6H PRN Lab Results Component Value Date/Time Glucose 104 (H) 09/20/2018 11:12 AM  
 Glucose 91 04/09/2018 06:24 PM  
 Glucose 100 (H) 12/18/2017 11:30 AM  
 Glucose 87 03/15/2017 04:20 PM  
 Glucose 97 08/02/2015 09:58 AM  
  
 
Assessment Active Problems: 
  New onset atrial fibrillation ([address] (9/20/2018) HTN (hypertension) (9/21/2018) CAD (coronary artery disease) (9/21/2018) Hypercholesteremia (9/21/2018) GERD (gastroesophageal reflux disease) (9/21/2018) Stomatitis and mucositis (9/21/2018) Plan Echo noted Sabino's sol LA cardizem Mobilize Same meds including eliquis SNF Lyly Duff MD 
9/22/2018,

## 2018-09-22 NOTE — PROGRESS NOTES
Bedside and Verbal shift change report given to Gracy Fong RN (oncoming nurse) by Shalom Silveira RN (offgoing nurse). Report included the following information SBAR, Kardex, ED Summary, Intake/Output, MAR, Recent Results and Cardiac Rhythm . Patient Vitals for the past 12 hrs: 
 Temp Pulse Resp BP SpO2  
09/22/18 1524 98.2 °F (36.8 °C) 66 20 125/62 96 %  
09/22/18 1255 98.3 °F (36.8 °C) 69 20 133/71 96 %  
09/22/18 0830 97.2 °F (36.2 °C) 65 20 196/76 97 %

## 2018-09-22 NOTE — PROGRESS NOTES
Assumed patient care. Received report from Rajinder Flores, [facility] (offgoing nurse). Report included SBAR, Kardex, and MAR. Patient resting in bed, in no signs of pain or distress. Call light and possesions within reach. 2125: Patient's heart rate 58, held metropolol will reassess. 2253: reassessment: heart rate 64. Blood pressure 160/68. metropolol given 0137: Patient pulled out IV accidentally. Cleaned linens. Patient states she does not want another iV at this time as she is worried she might bleed again. Patient alert and oriented x 4. This nurse stated that while patient has the right to refuse, it is not recommended as the nursing staff may need to administer medications in an emergency situation. Additionally explained the patient's as needed apresoline is IV which is the medication ordered to combat high blood pressure. Patient stated she would rather wait until a later time to have IV placed as she is nervous of pulling out IV and causing bleeding. Assured patient it will be secured. Patient continues to refuse. 0715: Bedside and Verbal shift change report given to Karie Wilson RN (oncoming nurse) by Lizzie Tuttle RN (offgoing nurse). Report included the following information SBAR, Kardex and MAR.

## 2018-09-22 NOTE — PROGRESS NOTES
Problem: Falls - Risk of 
Goal: *Absence of Falls Document Magdi Metz Fall Risk and appropriate interventions in the flowsheet. Outcome: Progressing Towards Goal 
Fall Risk Interventions: 
Mobility Interventions: Patient to call before getting OOB Medication Interventions: Teach patient to arise slowly Elimination Interventions: Call light in reach

## 2018-09-22 NOTE — ROUTINE PROCESS
0730 Bedside, Verbal and Written shift change report given to [address] [place] [address],[address] (oncoming nurse) by Michael Salcido (offgoing nurse). Report included the following information SBAR and Kardex. Pt resting in bed, alert and oriented x4, hard of hearing, per report pt lost IV overnight- bp has been elevated but pt declined IV so unable to give hydralazine 
 
0920 pt agreeable to IV insertion- 22 g inserted to right AC and hydralazine given for elevated bp

## 2018-09-22 NOTE — PROGRESS NOTES
0715: Bedside and Verbal shift change report given to Asya RN (oncoming nurse) by Hermelindo Noel RN (offgoing nurse). Report included the following information SBAR, Kardex and MAR.

## 2018-09-22 NOTE — PROGRESS NOTES
Problem: Mobility Impaired (Adult and Pediatric) Goal: *Acute Goals and Plan of Care (Insert Text) Physical Therapy Goals Initiated 9/22/2018 and to be accomplished within 7 day(s) 1. Patient will move from supine to sit and sit to supine , scoot up and down and roll side to side in bed with modified independence. 2.  Patient will transfer from bed to chair and chair to bed with modified independence using the least restrictive device. 3.  Patient will perform sit to stand with modified independence. 4.  Patient will ambulate with modified independence for >/= 150 feet with the least restrictive device. 5.  Patient will ascend/descend 3 stairs with bilateral handrail(s) with modified independence. 6.  Patient will demonstrate independence with performance of home exercise program.  
 
physical Therapy EVALUATION Patient: Alba Sylvester (63 y.o. female) Date: 9/22/2018 Primary Diagnosis: New onset atrial fibrillation ([facility] [address] Precautions:     
 PLOF:Lives alone, Ambulatory with quad cane around the house ASSESSMENT : 
 Patient requires between minimal assistance/contact guard assist and moderate assistance  for bed mobility, transfers and ambulation. Will benefit from skilled PT intervention to increase overall functional mobility independence and safety. Patient presents with deficits in:  
Bed Mobility, Transfers, Gait, Strength and Balance Patient will benefit from skilled intervention to address the above impairments. Patients rehabilitation potential is considered to be Good Factors which may influence rehabilitation potential include:  
[]         None noted 
[]         Mental ability/status [x]         Medical condition  
[]         Home/family situation and support systems 
[]         Safety awareness 
[]         Pain tolerance/management 
[]         Other:  
 
Recommendations for nursing:  
Written on communication board: OOB with assist of 1 
 Verbally communicated to: nurse Asya PLAN : 
Recommendations and Planned Interventions: 
[x]           Bed Mobility Training             []    Neuromuscular Re-Education 
[x]           Transfer Training                   []    Orthotic/Prosthetic Training 
[x]           Gait Training                          []    Modalities [x]           Therapeutic Exercises          []    Edema Management/Control 
[]           Therapeutic Activities            [x]    Patient and Family Training/Education 
[x]           Other (comment): Plan of care, bed mobility, transfers, gait with rolling walker Frequency/Duration: Patient will be followed by physical therapy 1 time per day/3-5 days per week to address goals. Discharge Recommendations: [address] [facility] Final Further Equipment Recommendations for Discharge: N/A, has a wheelchair and rolling walker at home SUBJECTIVE:  
Patient stated My son does not want me to live by myself anymore but he lives in [place] and I don't want to go there. I might need some assistance at home and I might can use my long term care insurance.   Reports right leg shorter than left leg. OBJECTIVE DATA SUMMARY:  
 
Past Medical History:  
Diagnosis Date  Coronary artery disease  Degenerative joint disease  Degenerative spine disease  Dyslipidemia  Hypertension  Hypertension  Kidney stone  Osteoarthritis Past Surgical History:  
Procedure Laterality Date  HX APPENDECTOMY  HX HYSTERECTOMY Barriers to Learning/Limitations: yes;  none Compensate with: visual, verbal, tactile, kinesthetic cues/model G CODE:Mobility [id number] Current  CL= 60-79%   Goal  CL= 60-79%. The severity rating is based on the Other [employer] Balance Scale Eval Complexity: History: MEDIUM  Complexity : 1-2 comorbidities / personal factors will impact the outcome/ POC Exam:MEDIUM Complexity : 3 Standardized tests and measures addressing body structure, function, activity limitation and / or participation in recreation  Presentation: MEDIUM Complexity : Evolving with changing characteristics  Clinical Decision Making:Medium Complexity [facility] Standing Balance Scale Overall Complexity:MEDIUM [facility] Standing Balance Scale 
0: Pt performs 25% or less of standing activity (Max assist) CN, 100% impaired. 1: Pt supports self with upper extremities but requires therapist assistance. Pt performs 25-50% of effort (Mod assist) CM, 80% to <100% impaired. 1+: Pt supports self with upper extremities but requires therapist assistance. Pt performs >50% effort. (Min assist). CL, 60% to <80% impaired. 2: Pt supports self independently with both upper extremities (walker, crutches, parallel bars). CL, 60% to <80% impaired. 2+: Pt support self independently with 1 upper extremity (cane, crutch, 1 parallel bar). CK, 40% to <60% impaired. 3: Pt stands without upper extremity support for up to 30 seconds. CK, 40% to <60% impaired. 3+: Pt stands without upper extremity support for 30 seconds or greater. CJ, 20% to <40% impaired. 4: Pt independently moves and returns center of gravity 1-2 inches in one plane. CJ, 20% to <40% impaired. 4+: Pt independently moves and returns center of gravity 1-2 inches in multiple planes. CI, 1% to <20% impaired. 5: Pt independently moves and returns center of gravity in all planes greater than 2 inches. CH, 0% impaired. Prior Level of Function/Home Situation: Lives alone, Ambulatory with quad cane around the house. Doesn't drive anymore. Neighbors helps with grocery shopping. Home Situation Home Environment: Private residence # Steps to Enter: 3 Rails to Enter: Yes Hand Rails : Bilateral 
One/Two Story Residence: One story Living Alone: Yes Support Systems: Friends \ neighbors Patient Expects to be Discharged to[de-identified] Private residence Current DME Used/Available at Home: [employer] Critical Behavior: 
Neurologic State: Alert Orientation Level: Oriented to person;Oriented to place;Oriented to situation;Oriented to time Cognition: Follows commands Safety/Judgement: Awareness of environment; Fall prevention Psychosocial 
Patient Behaviors: Aggressive verbally;Calm; Cooperative Purposeful Interaction: Yes Pt Identified Daily Priority: Clinical issues (comment) Caritas Process: Attend basic human needs; Teaching/learning Caring Interventions: Reassure Strength:   
Strength: Generally decreased, functional (both LE) Tone & Sensation:  
Tone: Normal (both LE) Sensation: Impaired (numbness both feet/legs) Range Of Motion: 
AROM: Generally decreased, functional (both LE) Functional Mobility: 
Bed Mobility: 
Supine to Sit: Minimum assistance; Moderate assistance; Additional time Transfers: 
Sit to Stand: Contact guard assistance;Minimum assistance from bed and [facility] Stand to Sit: Contact guard assistance Bed to [facility]: Min/CGA stand pivot transfer Balance:  
Sitting - Static: Good (unsupported) Sitting - Dynamic: Fair (occasional) Standing: With support Standing - Static: Fair Standing - Dynamic : Fair (Minus) Ambulation/Gait Training: 
Distance (ft): 12 Feet (ft) Assistive Device: Walker, rolling Ambulation - Level of Assistance: Contact guard assistance Gait Abnormalities: Toe walking (Right LE) Base of Support: Center of gravity altered Speed/Mckenna: Pace decreased (<100 feet/min) Step Length: Left shortened;Right shortened Pain: 
Pre treatment pain level:  2, right hip with weight bearing Post treatment pain level: 0, right hip Pain Scale 1: Visual 
Pain Intensity 1: 0 Activity Tolerance:  
Fair Please refer to the flowsheet for vital signs taken during this treatment. After treatment:  
[x]         Patient left in no apparent distress sitting up in chair []         Patient left in no apparent distress in bed 
[x]         Call bell left within reach [x]         Nursing notified 
[]         Caregiver present 
[]         Bed alarm activated COMMUNICATION/EDUCATION:  
[x]         Fall prevention education was provided and the patient/caregiver indicated understanding. [x]         Patient/family have participated as able in goal setting and plan of care. [x]         Patient/family agree to work toward stated goals and plan of care. []         Patient understands intent and goals of therapy, but is neutral about his/her participation. []         Patient is unable to participate in goal setting and plan of care. Thank you for this referral. 
Casey Ruiz, PT Time Calculation: 24 mins

## 2018-09-23 PROCEDURE — 65660000000 HC RM CCU STEPDOWN

## 2018-09-23 PROCEDURE — 74011250637 HC RX REV CODE- 250/637: Performed by: INTERNAL MEDICINE

## 2018-09-23 PROCEDURE — 97116 GAIT TRAINING THERAPY: CPT

## 2018-09-23 RX ADMIN — DILTIAZEM HYDROCHLORIDE 180 MG: 180 CAPSULE, EXTENDED RELEASE ORAL at 07:41

## 2018-09-23 RX ADMIN — METOPROLOL TARTRATE 25 MG: 25 TABLET ORAL at 07:45

## 2018-09-23 RX ADMIN — ATORVASTATIN CALCIUM 40 MG: 40 TABLET, FILM COATED ORAL at 20:47

## 2018-09-23 RX ADMIN — METOPROLOL TARTRATE 25 MG: 25 TABLET ORAL at 20:47

## 2018-09-23 RX ADMIN — HYDROCORTISONE 5 ML: 10 TABLET ORAL at 07:44

## 2018-09-23 RX ADMIN — PANTOPRAZOLE SODIUM 40 MG: 40 TABLET, DELAYED RELEASE ORAL at 07:45

## 2018-09-23 RX ADMIN — APIXABAN 2.5 MG: 2.5 TABLET, FILM COATED ORAL at 16:55

## 2018-09-23 RX ADMIN — APIXABAN 2.5 MG: 2.5 TABLET, FILM COATED ORAL at 07:42

## 2018-09-23 NOTE — ROUTINE PROCESS
0730 Bedside, Verbal and Written shift change report given to gi alvarez (oncoming nurse) by Claudean Iron (offgoing nurse). Report included the following information SBAR and Kardex. Patient currently resting in bed, alert and oriented to all spheres, denies pain or shortness of breath, call bell in reach, 5 Ps and hourly rounding completed, bed locked in low position

## 2018-09-23 NOTE — PROGRESS NOTES
Problem: Mobility Impaired (Adult and Pediatric) Goal: *Acute Goals and Plan of Care (Insert Text) Physical Therapy Goals Initiated 9/22/2018 and to be accomplished within 7 day(s) 1. Patient will move from supine to sit and sit to supine , scoot up and down and roll side to side in bed with modified independence. 2.  Patient will transfer from bed to chair and chair to bed with modified independence using the least restrictive device. 3.  Patient will perform sit to stand with modified independence. 4.  Patient will ambulate with modified independence for >/= 150 feet with the least restrictive device. 5.  Patient will ascend/descend 3 stairs with bilateral handrail(s) with modified independence. 6.  Patient will demonstrate independence with performance of home exercise program.  
 
physical Therapy TREATMENT Patient: Lyndon Garcia (8 y.o. female) Date: 9/23/2018 Diagnosis: New onset atrial fibrillation (HCC) <principal problem not specified> Precautions:   
Chart, physical therapy assessment, plan of care and goals were reviewed. PLOF: Lives alone. Ambulatory with quad cane around the house. ASSESSMENT: 
Required Min Assist with bed mobility. Min/CGA with transfers. Ambulated with rolling walker 75 feet x 2 with CGA and sit rest between. SpO2 stayed between 95-97% in room air and heart rate 65-71 bpm after gait. As patient lives by herself, will benefit from a short stay in a SNF. EDUCATION: Bed Mobility, Transfers, Gait with rolling walker. Importance of increasing OOB activities to facilitate recovery. Verbalized agreement. Progression toward goals: 
      Improving appropriately and progressing toward goals PLAN: 
Patient continues to benefit from skilled intervention to address the above impairments. Continue treatment per established plan of care. Discharge Recommendations:  X[facility] Further Equipment Recommendations for Discharge:  N/A  
 
SUBJECTIVE:  
Patient stated I tire out easily when I walk. I need a right hip replacement as I have no more cartilage on my hip. But surgery is too risky for my age. My hip crack when I walk.  OBJECTIVE DATA SUMMARY:  
Critical Behavior: 
Neurologic State: Alert, Appropriate for age Orientation Level: Appropriate for age Cognition: Appropriate decision making, Appropriate for age attention/concentration, Appropriate safety awareness, Follows commands Safety/Judgement: Awareness of environment, Fall prevention G CODE:Mobility [id number] Current  CL= 60-79%   Goal  CL= 60-79%. The severity rating is based on the Other [facility] Balance Scale [address] Standing Balance Scale 
0: Pt performs 25% or less of standing activity (Max assist) CN, 100% impaired. 1: Pt supports self with upper extremities but requires therapist assistance. Pt performs 25-50% of effort (Mod assist) CM, 80% to <100% impaired. 1+: Pt supports self with upper extremities but requires therapist assistance. Pt performs >50% effort. (Min assist). CL, 60% to <80% impaired. 2: Pt supports self independently with both upper extremities (walker, crutches, parallel bars). CL, 60% to <80% impaired. 2+: Pt support self independently with 1 upper extremity (cane, crutch, 1 parallel bar). CK, 40% to <60% impaired. 3: Pt stands without upper extremity support for up to 30 seconds. CK, 40% to <60% impaired. 3+: Pt stands without upper extremity support for 30 seconds or greater. CJ, 20% to <40% impaired. 4: Pt independently moves and returns center of gravity 1-2 inches in one plane. CJ, 20% to <40% impaired. 4+: Pt independently moves and returns center of gravity 1-2 inches in multiple planes. CI, 1% to <20% impaired. 5: Pt independently moves and returns center of gravity in all planes greater than 2 inches. CH, 0% impaired. Functional Mobility Training: 
Bed Mobility: 
Supine to Sit: Minimum assistance Sit to supine: Minimum assistance Transfers: 
Sit to Stand: Contact guard assistance;Minimum assistance Stand to Sit: Contact guard assistance Balance: 
Sitting - Static: Good (unsupported) Sitting - Dynamic: Fair (occasional) Standing: With support Standing - Static: Fair Standing - Dynamic : Fair Ambulation/Gait Training: 
Distance (ft): 75 Feet (ft) (+ 75 feet with sit rest between) Assistive Device: Walker, rolling Ambulation - Level of Assistance: Contact guard assistance Gait Abnormalities: Toe walking (Right LE) Base of Support: Center of gravity altered;Narrowed Speed/Mckenna: Pace decreased (<100 feet/min) Step Length: Left shortened;Right shortened Vital Signs Temp: 98.5 °F (36.9 °C) Pulse (Heart Rate): 64    
BP: 128/62 Resp Rate: 20    
O2 Sat (%): 95 % Pain: 
Pre treatment pain level: 3, right hip Post treatment pain level: 3, right hip Pain Scale 1: Numeric (0 - 10) Pain Intensity 1: 0 Activity Tolerance: 
Fair After treatment:  
Patient left in no apparent distress supine in bed Call bell left within reach Nursing notified Recommendations for nursing: 
Written on communication board: OOB with assist of 1 Verbally communicated to: nurse Asya Colindres, PT Time Calculation: 24 mins

## 2018-09-23 NOTE — PROGRESS NOTES
Problem: Falls - Risk of 
Goal: *Absence of Falls Document [facility] Fall Risk and appropriate interventions in the flowsheet. Outcome: Progressing Towards Goal 
Fall Risk Interventions: 
Mobility Interventions: Bed/chair exit alarm Medication Interventions: Patient to call before getting OOB Elimination Interventions: Call light in reach

## 2018-09-23 NOTE — PROGRESS NOTES
Problem: Falls - Risk of 
Goal: *Absence of Falls Document Rei Chavez Fall Risk and appropriate interventions in the flowsheet. Outcome: Progressing Towards Goal 
Fall Risk Interventions: 
Mobility Interventions: Bed/chair exit alarm, Patient to call before getting OOB, Utilize walker, cane, or other assistive device Medication Interventions: Patient to call before getting OOB, Teach patient to arise slowly, Bed/chair exit alarm Elimination Interventions: Call light in reach, Bed/chair exit alarm, Patient to call for help with toileting needs, Toilet paper/wipes in reach

## 2018-09-23 NOTE — ROUTINE PROCESS
Bedside and Verbal shift change report given to Kelly Cardenas RN (oncoming nurse) by Bev Cluver RN (offgoing nurse). Report included the following information SBAR, Kardex, ED Summary, Intake/Output, MAR, Recent Results and Cardiac Rhythm . Patient Vitals for the past 12 hrs: 
 Temp Pulse Resp BP SpO2  
09/23/18 0443 97.9 °F (36.6 °C) 67 20 145/73 99 % 09/23/18 0049 98 °F (36.7 °C) 73 20 146/64 99 % 09/22/18 2016 97.9 °F (36.6 °C) 70 20 133/51 96 %

## 2018-09-23 NOTE — PROGRESS NOTES
General Internal Medicine/Geriatrics Patient: Mihaela Roy MRN: [id number]  CSN: [id number] YOB: 1923  Age: 80 y.o. Sex: female DOA: 9/20/2018 LOS:  LOS: 3 days Subjective:  
 
Mouth better No CP, SOB Weak Usual arthralgia Review of Systems: 
Eyes: negative Ears, Nose, Mouth, Throat, and Face: negative Respiratory: negative for dyspnea on exertion Cardiovascular: negative for chest pain Gastrointestinal: negative for nausea, vomiting and diarrhea Genitourinary:negative for dysuria and hematuria Integument/Breast: negative Hematologic/Lymphatic: negative for bleeding Musculoskeletal:negative for arthralgias Neurological: negative for weakness Behavioral/Psychiatric: negative for behavior problems Endocrine: negative for temperature intolerance Allergic/Immunologic: negative for hay fever Objective:  
 
Physical Exam: 
Patient Vitals for the past 24 hrs: 
 Temp Pulse Resp BP SpO2  
09/23/18 1317 98.5 °F (36.9 °C) 64 20 128/62 95 % 09/23/18 0827 97.7 °F (36.5 °C) 65 20 171/72 95 % 09/23/18 0443 97.9 °F (36.6 °C) 67 20 145/73 99 % 09/23/18 0049 98 °F (36.7 °C) 73 20 146/64 99 % 09/22/18 2016 97.9 °F (36.6 °C) 70 20 133/51 96 %  
09/22/18 1524 98.2 °F (36.8 °C) 66 20 125/62 96 % AF 
BP improved Tele, SR 
 
 
HEENT: wnl, mild stomatitis NECK:  No venous distention or adenopathy HEART: RRR, no rubs or gallops LUNGS: Clear to auscultation ABDOMEN: soft with active BS. No tenderness, no distention, no organomegaly EXT: warm,no edema SKIN: Normal turgor, no breaks NEURO: Awake, alert, non focal 
 
Intake and Output: 
Current Shift:  09/23 0701 - 09/23 1900 In: 120 [P.O.:120] Out: - Last three shifts:  09/21 1901 - 09/23 0700 In: 240 [P.O.:240] Out: 200 [Urine:200] No results found for this or any previous visit (from the past 24 hour(s)). No results found. Current Facility-Administered Medications Medication Dose Route Frequency  dilTIAZem CD (CARDIZEM CD) capsule 180 mg  180 mg Oral DAILY  metoprolol tartrate (LOPRESSOR) tablet 25 mg  25 mg Oral Q12H  
 apixaban (ELIQUIS) tablet 2.5 mg  2.5 mg Oral BID  atorvastatin (LIPITOR) tablet 40 mg  40 mg Oral QHS  pantoprazole (PROTONIX) tablet 40 mg  40 mg Oral ACB  polyethylene glycol (MIRALAX) packet 17 g  17 g Oral DAILY  hydrALAZINE (APRESOLINE) 20 mg/mL injection 10 mg  10 mg IntraVENous Q6H PRN Lab Results Component Value Date/Time Glucose 104 (H) 09/20/2018 11:12 AM  
 Glucose 91 04/09/2018 06:24 PM  
 Glucose 100 (H) 12/18/2017 11:30 AM  
 Glucose 87 03/15/2017 04:20 PM  
 Glucose 97 08/02/2015 09:58 AM  
  
 
Assessment Active Problems: 
  New onset atrial fibrillation ([address] (9/20/2018) HTN (hypertension) (9/21/2018) CAD (coronary artery disease) (9/21/2018) Hypercholesteremia (9/21/2018) GERD (gastroesophageal reflux disease) (9/21/2018) Stomatitis and mucositis (9/21/2018) Plan Echo noted [facility] Same meds including [place] cardizem and Eliquis Mobilize SNF Skip Lucas MD 
9/23/2018,

## 2018-09-24 ENCOUNTER — HOSPITAL ENCOUNTER (INPATIENT)
Age: 83
LOS: 17 days | Discharge: HOME | End: 2018-10-11
Attending: INTERNAL MEDICINE | Admitting: INTERNAL MEDICINE

## 2018-09-24 VITALS
HEIGHT: 64 IN | SYSTOLIC BLOOD PRESSURE: 162 MMHG | WEIGHT: 128.75 LBS | HEART RATE: 74 BPM | BODY MASS INDEX: 21.98 KG/M2 | TEMPERATURE: 98.6 F | DIASTOLIC BLOOD PRESSURE: 67 MMHG | RESPIRATION RATE: 17 BRPM | OXYGEN SATURATION: 95 %

## 2018-09-24 PROCEDURE — 74011250637 HC RX REV CODE- 250/637: Performed by: INTERNAL MEDICINE

## 2018-09-24 RX ORDER — DILTIAZEM HYDROCHLORIDE 180 MG/1
180 CAPSULE, COATED, EXTENDED RELEASE ORAL DAILY
Status: DISCONTINUED | OUTPATIENT
Start: 2018-09-25 | End: 2018-10-11 | Stop reason: HOSPADM

## 2018-09-24 RX ORDER — TRAMADOL HYDROCHLORIDE 50 MG/1
50 TABLET ORAL
Status: DISCONTINUED | OUTPATIENT
Start: 2018-09-24 | End: 2018-10-11 | Stop reason: HOSPADM

## 2018-09-24 RX ORDER — POLYETHYLENE GLYCOL 3350 17 G/17G
17 POWDER, FOR SOLUTION ORAL DAILY
Status: DISCONTINUED | OUTPATIENT
Start: 2018-09-25 | End: 2018-10-11 | Stop reason: HOSPADM

## 2018-09-24 RX ORDER — DILTIAZEM HYDROCHLORIDE 180 MG/1
180 CAPSULE, COATED, EXTENDED RELEASE ORAL DAILY
Qty: 30 CAP | Refills: 0 | Status: ON HOLD
Start: 2018-09-24 | End: 2018-10-11

## 2018-09-24 RX ORDER — ERGOCALCIFEROL 1.25 MG/1
50000 CAPSULE ORAL
Status: DISCONTINUED | OUTPATIENT
Start: 2018-09-24 | End: 2018-10-11 | Stop reason: HOSPADM

## 2018-09-24 RX ORDER — ATORVASTATIN CALCIUM 40 MG/1
40 TABLET, FILM COATED ORAL
Status: DISCONTINUED | OUTPATIENT
Start: 2018-09-24 | End: 2018-10-11 | Stop reason: HOSPADM

## 2018-09-24 RX ORDER — PANTOPRAZOLE SODIUM 40 MG/1
40 TABLET, DELAYED RELEASE ORAL
Status: DISCONTINUED | OUTPATIENT
Start: 2018-09-25 | End: 2018-10-11 | Stop reason: HOSPADM

## 2018-09-24 RX ORDER — ACETAMINOPHEN 325 MG/1
650 TABLET ORAL
Status: DISCONTINUED | OUTPATIENT
Start: 2018-09-24 | End: 2018-10-11 | Stop reason: HOSPADM

## 2018-09-24 RX ORDER — NITROGLYCERIN 0.4 MG/1
0.4 TABLET SUBLINGUAL AS NEEDED
Status: DISCONTINUED | OUTPATIENT
Start: 2018-09-24 | End: 2018-10-11 | Stop reason: HOSPADM

## 2018-09-24 RX ORDER — METOPROLOL TARTRATE 25 MG/1
25 TABLET, FILM COATED ORAL 2 TIMES DAILY
Status: DISCONTINUED | OUTPATIENT
Start: 2018-09-24 | End: 2018-10-11 | Stop reason: HOSPADM

## 2018-09-24 RX ADMIN — PANTOPRAZOLE SODIUM 40 MG: 40 TABLET, DELAYED RELEASE ORAL at 09:07

## 2018-09-24 RX ADMIN — METOPROLOL TARTRATE 25 MG: 25 TABLET, FILM COATED ORAL at 18:34

## 2018-09-24 RX ADMIN — METOPROLOL TARTRATE 25 MG: 25 TABLET ORAL at 09:08

## 2018-09-24 RX ADMIN — ATORVASTATIN CALCIUM 40 MG: 40 TABLET, FILM COATED ORAL at 21:51

## 2018-09-24 RX ADMIN — APIXABAN 2.5 MG: 2.5 TABLET, FILM COATED ORAL at 18:34

## 2018-09-24 RX ADMIN — APIXABAN 2.5 MG: 2.5 TABLET, FILM COATED ORAL at 09:08

## 2018-09-24 RX ADMIN — ERGOCALCIFEROL 50000 UNITS: 1.25 CAPSULE ORAL at 18:34

## 2018-09-24 RX ADMIN — DILTIAZEM HYDROCHLORIDE 180 MG: 180 CAPSULE, EXTENDED RELEASE ORAL at 09:07

## 2018-09-24 NOTE — ROUTINE PROCESS
1915: Assumed care. Awake. Resting in bed. Denies any pain or discomfort at this time. Call light within reach. 2047: Due meds given. HS snack provided per patient request. 
 
0023: No change from previous assessment. 0215: c/o right shoulder pain. Refused pain med. Hot compress applied on per patient request. 
 
8691: No change from previous assessment. 0630: Slept on & off thru night. Needs attended. 8400: Bedside and Verbal shift change report given to [address] [place] [address] (oncoming nurse) by me (offgoing nurse). Report included the following information SBAR, Kardex, Intake/Output, MAR and Recent Results.

## 2018-09-24 NOTE — ANCILLARY DISCHARGE INSTRUCTIONS
Patient and/or next of kin has been given the [employer] Important Message From Medicare About Your Rights\" letter and all questions were answered.

## 2018-09-24 NOTE — PROGRESS NOTES
I have reviewed this patient's current medication list and recent laboratory results. At this time, I do not suggest any drug therapy adjustments or additional laboratory monitoring. Thank you,  Alia ALLEN  Ph. M. S.  9/24/2018

## 2018-09-24 NOTE — PROGRESS NOTES
Problem: Falls - Risk of 
Goal: *Absence of Falls Document Marco A Jaimes Fall Risk and appropriate interventions in the flowsheet. Outcome: Progressing Towards Goal 
Fall Risk Interventions: 
Mobility Interventions: Patient to call before getting OOB, Utilize walker, cane, or other assistive device, PT Consult for mobility concerns Medication Interventions: Patient to call before getting OOB, Teach patient to arise slowly Elimination Interventions: Call light in reach, Patient to call for help with toileting needs

## 2018-09-24 NOTE — PROGRESS NOTES
To discharge to [facility]. Attempted to notify patient family, Faizan Hayes, [phone], however message stated the mailbox was full and was not accepting messages at this time. Family made aware of discharge and agrees with discharge plan. Care Management Interventions PCP Verified by CM: Yes (Dr Blaise Mauro and Dr Zakia Busch) Palliative Care Criteria Met (RRAT>21 & CHF Dx)?: No 
Mode of Transport at Discharge: Other (see comment) (wheelchair) Transition of Care Consult (CM Consult): SNF ([facility]) Partner SNF: No 
Reason Why Partner SNF Not Chosen: Location MyChart Signup: No 
Discharge Durable Medical Equipment: No 
Health Maintenance Reviewed: Yes Physical Therapy Consult: Yes Occupational Therapy Consult: Yes Speech Therapy Consult: No 
Current Support Network: Lives Alone Confirm Follow Up Transport: Other (see comment) (wheelchair) Plan discussed with Pt/Family/Caregiver: Yes Freedom of Choice Offered: Yes [employer] Information Provided?: No 
Discharge Location Discharge Placement: Skilled nursing facility  FOLLOW UP VISIT Appointment in: Other [facility]) SNF (Order #[id number]) on 9/24/18

## 2018-09-24 NOTE — DISCHARGE INSTRUCTIONS
DISCHARGE SUMMARY from Nurse    PATIENT INSTRUCTIONS:    After general anesthesia or intravenous sedation, for 24 hours or while taking prescription Narcotics:  · Limit your activities  · Do not drive and operate hazardous machinery  · Do not make important personal or business decisions  · Do  not drink alcoholic beverages  · If you have not urinated within 8 hours after discharge, please contact your surgeon on call. Report the following to your surgeon:  · Excessive pain, swelling, redness or odor of or around the surgical area  · Temperature over 100.5  · Nausea and vomiting lasting longer than 4 hours or if unable to take medications  · Any signs of decreased circulation or nerve impairment to extremity: change in color, persistent  numbness, tingling, coldness or increase pain  · Any questions    What to do at Home:  Recommended activity: Activity as tolerated,     If you experience any of the following symptoms as listed under \" When should I call for help? \" in your discharge teaching  , please follow up with your Doctor and/ or call 911. *  Please give a list of your current medications to your Primary Care Provider. *  Please update this list whenever your medications are discontinued, doses are      changed, or new medications (including over-the-counter products) are added. *  Please carry medication information at all times in case of emergency situations. These are general instructions for a healthy lifestyle:    No smoking/ No tobacco products/ Avoid exposure to second hand smoke  Surgeon General's Warning:  Quitting smoking now greatly reduces serious risk to your health.     Obesity, smoking, and sedentary lifestyle greatly increases your risk for illness    A healthy diet, regular physical exercise & weight monitoring are important for maintaining a healthy lifestyle    You may be retaining fluid if you have a history of heart failure or if you experience any of the following symptoms:  Weight gain of 3 pounds or more overnight or 5 pounds in a week, increased swelling in our hands or feet or shortness of breath while lying flat in bed. Please call your doctor as soon as you notice any of these symptoms; do not wait until your next office visit. Recognize signs and symptoms of STROKE:    F-face looks uneven    A-arms unable to move or move unevenly    S-speech slurred or non-existent    T-time-call 911 as soon as signs and symptoms begin-DO NOT go       Back to bed or wait to see if you get better-TIME IS BRAIN. Warning Signs of HEART ATTACK     Call 911 if you have these symptoms:   Chest discomfort. Most heart attacks involve discomfort in the center of the chest that lasts more than a few minutes, or that goes away and comes back. It can feel like uncomfortable pressure, squeezing, fullness, or pain.  Discomfort in other areas of the upper body. Symptoms can include pain or discomfort in one or both arms, the back, neck, jaw, or stomach.  Shortness of breath with or without chest discomfort.  Other signs may include breaking out in a cold sweat, nausea, or lightheadedness. Don't wait more than five minutes to call 911 - MINUTES MATTER! Fast action can save your life. Calling 911 is almost always the fastest way to get lifesaving treatment. Emergency Medical Services staff can begin treatment when they arrive -- up to an hour sooner than if someone gets to the hospital by car. The discharge information has been reviewed with the patient. The patient verbalized understanding. Discharge medications reviewed with the patient and appropriate educational materials and side effects teaching were provided. Patient armband removed and shredded           Atrial Fibrillation: Care Instructions  Your Care Instructions    Atrial fibrillation is an irregular and often fast heartbeat. Treating this condition is important for several reasons.  It can cause blood clots, which can travel from your heart to your brain and cause a stroke. If you have a fast heartbeat, you may feel lightheaded, dizzy, and weak. An irregular heartbeat can also increase your risk for heart failure. Atrial fibrillation is often the result of another heart condition, such as high blood pressure or coronary artery disease. Making changes to improve your heart condition will help you stay healthy and active. Follow-up care is a key part of your treatment and safety. Be sure to make and go to all appointments, and call your doctor if you are having problems. It's also a good idea to know your test results and keep a list of the medicines you take. How can you care for yourself at home? Medicines    · Take your medicines exactly as prescribed. Call your doctor if you think you are having a problem with your medicine. You will get more details on the specific medicines your doctor prescribes.     · If your doctor has given you a blood thinner to prevent a stroke, be sure you get instructions about how to take your medicine safely. Blood thinners can cause serious bleeding problems.     · Do not take any vitamins, over-the-counter drugs, or herbal products without talking to your doctor first.    Lifestyle changes    · Do not smoke. Smoking can increase your chance of a stroke and heart attack. If you need help quitting, talk to your doctor about stop-smoking programs and medicines. These can increase your chances of quitting for good.     · Eat a heart-healthy diet.     · Stay at a healthy weight. Lose weight if you need to.     · Limit alcohol to 2 drinks a day for men and 1 drink a day for women. Too much alcohol can cause health problems.     · Avoid colds and flu. Get a pneumococcal vaccine shot. If you have had one before, ask your doctor whether you need another dose. Get a flu shot every year. If you must be around people with colds or flu, wash your hands often.    Activity    · If your doctor recommends it, get more exercise. Walking is a good choice. Bit by bit, increase the amount you walk every day. Try for at least 30 minutes on most days of the week. You also may want to swim, bike, or do other activities. Your doctor may suggest that you join a cardiac rehabilitation program so that you can have help increasing your physical activity safely.     · Start light exercise if your doctor says it is okay. Even a small amount will help you get stronger, have more energy, and manage stress. Walking is an easy way to get exercise. Start out by walking a little more than you did in the hospital. Gradually increase the amount you walk.     · When you exercise, watch for signs that your heart is working too hard. You are pushing too hard if you cannot talk while you are exercising. If you become short of breath or dizzy or have chest pain, sit down and rest immediately.     · Check your pulse regularly. Place two fingers on the artery at the palm side of your wrist, in line with your thumb. If your heartbeat seems uneven or fast, talk to your doctor. When should you call for help? Call 911 anytime you think you may need emergency care. For example, call if:    · You have symptoms of a heart attack. These may include:  ¨ Chest pain or pressure, or a strange feeling in the chest.  ¨ Sweating. ¨ Shortness of breath. ¨ Nausea or vomiting. ¨ Pain, pressure, or a strange feeling in the back, neck, jaw, or upper belly or in one or both shoulders or arms. ¨ Lightheadedness or sudden weakness. ¨ A fast or irregular heartbeat. After you call 911, the  may tell you to chew 1 adult-strength or 2 to 4 low-dose aspirin. Wait for an ambulance. Do not try to drive yourself.     · You have symptoms of a stroke. These may include:  ¨ Sudden numbness, tingling, weakness, or loss of movement in your face, arm, or leg, especially on only one side of your body. ¨ Sudden vision changes. ¨ Sudden trouble speaking.   ¨ Sudden confusion or trouble understanding simple statements. ¨ Sudden problems with walking or balance. ¨ A sudden, severe headache that is different from past headaches.     · You passed out (lost consciousness).    Call your doctor now or seek immediate medical care if:    · You have new or increased shortness of breath.     · You feel dizzy or lightheaded, or you feel like you may faint.     · Your heart rate becomes irregular.     · You can feel your heart flutter in your chest or skip heartbeats. Tell your doctor if these symptoms are new or worse.    Watch closely for changes in your health, and be sure to contact your doctor if you have any problems. Where can you learn more? Go to http://christ-antwon.info/. Enter U020 in the search box to learn more about \"Atrial Fibrillation: Care Instructions. \"  Current as of: December 6, 2017  Content Version: 11.7  © 1506-8710 [email or web address]. Care instructions adapted under license by [employer] (which disclaims liability or warranty for this information). If you have questions about a medical condition or this instruction, always ask your healthcare professional. [place] [address] any warranty or liability for your use of this information. High Blood Pressure: Care Instructions  Your Care Instructions    If your blood pressure is usually above 130/80, you have high blood pressure, or hypertension. That means the top number is 130 or higher or the bottom number is 80 or higher, or both. Despite what a lot of people think, high blood pressure usually doesn't cause headaches or make you feel dizzy or lightheaded. It usually has no symptoms. But it does increase your risk for heart attack, stroke, and kidney or eye damage. The higher your blood pressure, the more your risk increases. Your doctor will give you a goal for your blood pressure. Your goal will be based on your health and your age.   Lifestyle changes, such as eating healthy and being active, are always important to help lower blood pressure. You might also take medicine to reach your blood pressure goal.  Follow-up care is a key part of your treatment and safety. Be sure to make and go to all appointments, and call your doctor if you are having problems. It's also a good idea to know your test results and keep a list of the medicines you take. How can you care for yourself at home? Medical treatment  · If you stop taking your medicine, your blood pressure will go back up. You may take one or more types of medicine to lower your blood pressure. Be safe with medicines. Take your medicine exactly as prescribed. Call your doctor if you think you are having a problem with your medicine. · Talk to your doctor before you start taking aspirin every day. Aspirin can help certain people lower their risk of a heart attack or stroke. But taking aspirin isn't right for everyone, because it can cause serious bleeding. · See your doctor regularly. You may need to see the doctor more often at first or until your blood pressure comes down. · If you are taking blood pressure medicine, talk to your doctor before you take decongestants or anti-inflammatory medicine, such as ibuprofen. Some of these medicines can raise blood pressure. · Learn how to check your blood pressure at home. Lifestyle changes  · Stay at a healthy weight. This is especially important if you put on weight around the waist. Losing even 10 pounds can help you lower your blood pressure. · If your doctor recommends it, get more exercise. Walking is a good choice. Bit by bit, increase the amount you walk every day. Try for at least 30 minutes on most days of the week. You also may want to swim, bike, or do other activities. · Avoid or limit alcohol. Talk to your doctor about whether you can drink any alcohol. · Try to limit how much sodium you eat to less than 2,300 milligrams (mg) a day.  Your doctor may ask you to try to eat less than 1,500 mg a day. · Eat plenty of fruits (such as bananas and oranges), vegetables, legumes, whole grains, and low-fat dairy products. · Lower the amount of saturated fat in your diet. Saturated fat is found in animal products such as milk, cheese, and meat. Limiting these foods may help you lose weight and also lower your risk for heart disease. · Do not smoke. Smoking increases your risk for heart attack and stroke. If you need help quitting, talk to your doctor about stop-smoking programs and medicines. These can increase your chances of quitting for good. When should you call for help? Call 911 anytime you think you may need emergency care. This may mean having symptoms that suggest that your blood pressure is causing a serious heart or blood vessel problem. Your blood pressure may be over 180/110.   For example, call 911 if:    · You have symptoms of a heart attack. These may include:  ¨ Chest pain or pressure, or a strange feeling in the chest.  ¨ Sweating. ¨ Shortness of breath. ¨ Nausea or vomiting. ¨ Pain, pressure, or a strange feeling in the back, neck, jaw, or upper belly or in one or both shoulders or arms. ¨ Lightheadedness or sudden weakness. ¨ A fast or irregular heartbeat.     · You have symptoms of a stroke. These may include:  ¨ Sudden numbness, tingling, weakness, or loss of movement in your face, arm, or leg, especially on only one side of your body. ¨ Sudden vision changes. ¨ Sudden trouble speaking. ¨ Sudden confusion or trouble understanding simple statements. ¨ Sudden problems with walking or balance. ¨ A sudden, severe headache that is different from past headaches.     · You have severe back or belly pain.    Do not wait until your blood pressure comes down on its own.  Get help right away.   Call your doctor now or seek immediate care if:    · Your blood pressure is much higher than normal (such as 180/110 or higher), but you don't have symptoms.     · You think high blood pressure is causing symptoms, such as:  ¨ Severe headache. ¨ Blurry vision.    Watch closely for changes in your health, and be sure to contact your doctor if:    · Your blood pressure measures 140/90 or higher at least 2 times. That means the top number is 140 or higher or the bottom number is 90 or higher, or both.     · You think you may be having side effects from your blood pressure medicine.     · Your blood pressure is usually normal, but it goes above normal at least 2 times. Where can you learn more? Go to http://christ-antwon.info/. Enter A659 in the search box to learn more about \"High Blood Pressure: Care Instructions. \"  Current as of: December 6, 2017  Content Version: 11.7  © 9691-9668 [employer]. Care instructions adapted under license by [employer] (which disclaims liability or warranty for this information). If you have questions about a medical condition or this instruction, always ask your healthcare professional. [address] any warranty or liability for your use of this information.     ___________________________________________________________________________________________________________________________________

## 2018-09-24 NOTE — IP AVS SNAPSHOT
Shashank Morrell 
 
 
 [address] [facility] Roshni Trent 43 Patient: [facility] ERNESTINE MRN: [id number] PRV:[id number] About your hospitalization You were admitted on:  September 24, 2018 You last received care in the:  [address] [place] [address] You were discharged on:  October 11, 2018 Why you were hospitalized Your primary diagnosis was:  Not on File Follow-up Information Follow up With Details Comments Contact Info Ad Ellsworth MD On 10/18/2018 Follw-up on Thursday, Oct 18 at 10:30 am 27 Liam Mendez [facility] [phone] [phone] 
[phone] Discharge Orders None A check john indicates which time of day the medication should be taken. My Medications CHANGE how you take these medications Instructions Each Dose to Equal  
 Morning Noon Evening Bedtime  
 metoprolol tartrate 25 mg tablet Commonly known as:  LOPRESSOR What changed:  how much to take Your last dose was: Your next dose is: Take 1 Tab by mouth every twelve (12) hours. Indications: hypertension 25 mg CONTINUE taking these medications Instructions Each Dose to Equal  
 Morning Noon Evening Bedtime  
 acetaminophen 500 mg tablet Commonly known as:  [address] [facility] De Olive[facility] Your last dose was: Your next dose is: Take 2 Tabs by mouth every six (6) hours as needed for Pain. 1000 mg  
    
   
   
   
  
 apixaban 2.5 mg tablet Commonly known as:  Montse Squibb Your last dose was: Your next dose is: Take 1 Tab by mouth two (2) times a day. 2.5 mg  
    
   
   
   
  
 atorvastatin 40 mg tablet Commonly known as:  LIPITOR Your last dose was: Your next dose is: Take 1 Tab by mouth nightly. 40 mg  
    
   
   
   
  
 dilTIAZem  mg ER capsule Commonly known as:  CARDIZEM CD Your last dose was: Your next dose is: Take 1 Cap by mouth daily. 180 mg  
    
   
   
   
  
 nitroglycerin 0.4 mg SL tablet Commonly known as:  NITROSTAT Your last dose was: Your next dose is:    
   
   
 1 Tab by SubLINGual route every five (5) minutes as needed for Chest Pain. 0.4 mg  
    
   
   
   
  
 polyethylene glycol 17 gram packet Commonly known as:  [place] Your last dose was: Your next dose is: Take 1 Packet by mouth daily. 17 g PROTONIX 40 mg tablet Generic drug:  pantoprazole Your last dose was: Your next dose is: Take 40 mg by mouth daily. Indications: GASTROESOPHAGEAL REFLUX  
 40 mg  
    
   
   
   
  
 traMADol 50 mg tablet Commonly known as:  ULTRAM  
   
Your last dose was: Your next dose is: Take 1 Tab by mouth every eight (8) hours as needed for Pain. Max Daily Amount: 150 mg.  
 50 mg  
    
   
   
   
  
 VITAMIN D2 50,000 unit capsule Generic drug:  ergocalciferol Your last dose was: Your next dose is: Take 50,000 Units by mouth. 32127 Units Where to Get Your Medications Information on where to get these meds will be given to you by the nurse or doctor. ! Ask your nurse or doctor about these medications  
  apixaban 2.5 mg tablet  
 dilTIAZem  mg ER capsule  
 metoprolol tartrate 25 mg tablet  
 nitroglycerin 0.4 mg SL tablet Opioid Education Prescription Opioids: What You Need to Know: 
 
Prescription opioids can be used to help relieve moderate-to-severe pain and are often prescribed following a surgery or injury, or for certain health conditions. These medications can be an important part of treatment but also come with serious risks.   Opioids are strong pain medicines. Examples include hydrocodone, oxycodone, fentanyl, and morphine. Heroin is an example of an illegal opioid. It is important to work with your health care provider to make sure you are getting the safest, most effective care. WHAT ARE THE RISKS AND SIDE EFFECTS OF OPIOID USE? Prescription opioids carry serious risks of addiction and overdose, especially with prolonged use. An opioid overdose, often marked by slow breathing, can cause sudden death. The use of prescription opioids can have a number of side effects as well, even when taken as directed. · Tolerance-meaning you might need to take more of a medication for the same pain relief · Physical dependence-meaning you have symptoms of withdrawal when the medication is stopped. Withdrawal symptoms can include nausea, sweating, chills, diarrhea, stomach cramps, and muscle aches. Withdrawal can last up to several weeks, depending on which drug you took and how long you took it. · Increased sensitivity to pain · Constipation · Nausea, vomiting, and dry mouth · Sleepiness and dizziness · Confusion · Depression · Low levels of testosterone that can result in lower sex drive, energy, and strength · Itching and sweating RISKS ARE GREATER WITH:      
· History of drug misuse, substance use disorder, or overdose · Mental health conditions (such as depression or anxiety) · Sleep apnea · Older age (72 years or older) · Pregnancy Avoid alcohol while taking prescription opioids. Also, unless specifically advised by your health care provider, medications to avoid include: · Benzodiazepines (such as Xanax or Valium) · Muscle relaxants (such as Soma or Flexeril) · Hypnotics (such as Ambien or Lunesta) · Other prescription opioids KNOW YOUR OPTIONS Talk to your health care provider about ways to manage your pain that don't involve prescription opioids.   Some of these options may actually work better and have fewer risks and side effects. Consult your physician before adding or stopping any medications, treatments, or physical activity. Options may include: 
· Pain relievers such as acetaminophen, ibuprofen, and naproxen · Some medications that are also used for depression or seizures · Physical therapy and exercise · Counseling to help patients learn how to cope better with triggers of pain and stress. · Application of heat or cold compress · Massage therapy · Relaxation techniques Be Informed Make sure you know the name of your medication, how much and how often to take it, and its potential risks & side effects. IF YOU ARE PRESCRIBED OPIOIDS FOR PAIN: 
· Never take opioids in greater amounts or more often than prescribed. Remember the goal is not to be pain-free but to manage your pain at a tolerable level. · Follow up with your primary care provider to: · Work together to create a plan on how to manage your pain. · Talk about ways to help manage your pain that don't involve prescription opioids. · Talk about any and all concerns and side effects. · Help prevent misuse and abuse. · Never sell or share prescription opioids · Help prevent misuse and abuse. · Store prescription opioids in a secure place and out of reach of others (this may include visitors, children, friends, and family). · Safely dispose of unused/unwanted prescription opioids: Find your community drug take-back program or your pharmacy mail-back program, or flush them down the toilet, following guidance from the Food and Drug Administration (www.fda.gov/Drugs/ResourcesForYou). · Visit www.cdc.gov/drugoverdose to learn about the risks of opioid abuse and overdose. · If you believe you may be struggling with addiction, tell your health care provider and ask for guidance or call [address] [place] [address]"[employer] at 5-640-610-SPKN. Discharge Instructions Preventing Falls: Care Instructions Your Care Instructions Getting around your home safely can be a challenge if you have injuries or health problems that make it easy for you to fall. Loose rugs and furniture in walkways are among the dangers for many older people who have problems walking or who have poor eyesight. People who have conditions such as arthritis, osteoporosis, or dementia also have to be careful not to fall. You can make your home safer with a few simple measures. Follow-up care is a key part of your treatment and safety. Be sure to make and go to all appointments, and call your doctor if you are having problems. It's also a good idea to know your test results and keep a list of the medicines you take. How can you care for yourself at home? Taking care of yourself · You may get dizzy if you do not drink enough water. To prevent dehydration, drink plenty of fluids, enough so that your urine is light yellow or clear like water. Choose water and other caffeine-free clear liquids. If you have kidney, heart, or liver disease and have to limit fluids, talk with your doctor before you increase the amount of fluids you drink. · Exercise regularly to improve your strength, muscle tone, and balance. Walk if you can. Swimming may be a good choice if you cannot walk easily. · Have your vision and hearing checked each year or any time you notice a change. If you have trouble seeing and hearing, you might not be able to avoid objects and could lose your balance. · Know the side effects of the medicines you take. Ask your doctor or pharmacist whether the medicines you take can affect your balance. Sleeping pills or sedatives can affect your balance. · Limit the amount of alcohol you drink. Alcohol can impair your balance and other senses.  
· Ask your doctor whether calluses or corns on your feet need to be removed. If you wear loose-fitting shoes because of calluses or corns, you can lose your balance and fall. · Talk to your doctor if you have numbness in your feet. Preventing falls at home · Remove raised doorway thresholds, throw rugs, and clutter. Repair loose carpet or raised areas in the floor. · Move furniture and electrical cords to keep them out of walking paths. · Use nonskid floor wax, and wipe up spills right away, especially on ceramic tile floors. · If you use a walker or cane, put rubber tips on it. If you use crutches, clean the bottoms of them regularly with an abrasive pad, such as steel wool. · Keep your house well lit, especially [place], and outside walkways. Use night-lights in areas such as hallways and bathrooms. Add extra light switches or use remote switches (such as switches that go on or off when you clap your hands) to make it easier to turn lights on if you have to get up during the night. · Install sturdy handrails on stairways. · Move items in your cabinets so that the things you use a lot are on the lower shelves (about waist level). · Keep a cordless phone and a flashlight with new batteries by your bed. If possible, put a phone in each of the main rooms of your house, or carry a cell phone in case you fall and cannot reach a phone. Or, you can wear a device around your neck or wrist. You push a button that sends a signal for help. · Wear low-heeled shoes that fit well and give your feet good support. Use footwear with nonskid soles. Check the heels and soles of your shoes for wear. Repair or replace worn heels or soles. · Do not wear socks without shoes on wood floors. · Walk on the grass when the sidewalks are slippery. If you live in an area that gets snow and ice in the winter, sprinkle salt on slippery steps and sidewalks. Preventing falls in the bath · Install grab bars and nonskid mats inside and outside your shower or tub and near the toilet and sinks. · Use shower chairs and bath benches. · Use a hand-held shower head that will allow you to sit while showering. · Get into a tub or shower by putting the weaker leg in first. Get out of a tub or shower with your strong side first. 
· Repair loose toilet seats and consider installing a raised toilet seat to make getting on and off the toilet easier. · Keep your bathroom door unlocked while you are in the shower. Where can you learn more? Go to http://christ-antwon.info/. Enter [phone] in the search box to learn more about \"Preventing Falls: Care Instructions. \" Current as of: March 16, 2018 Content Version: 11.8 © 1454-5629 [employer]wise[employer]. Care instructions adapted under license by [employer] (which disclaims liability or warranty for this information). If you have questions about a medical condition or this instruction, always ask your healthcare professional. [place] [address] any warranty or liability for your use of this information. 
  
 
 
  
  
  
Introducing [facility]! [employer] introduces [employer] patient portal. Now you can access parts of your medical record, email your doctor's office, and request medication refills online. 1. In your internet browser, go to [email or web address] 2. Click on the First Time User? Click Here link in the Sign In box. You will see the New Member Sign Up page. 3. Enter your [employer] Access Code exactly as it appears below. You will not need to use this code after youve completed the sign-up process. If you do not sign up before the expiration date, you must request a new code. · [employer] Access Code: [email or web address] Expires: 12/19/2018 11:00 AM 
 
4. Enter the last four digits of your Social Security Number (xxxx) and Date of Birth (mm/dd/yyyy) as indicated and click Submit. You will be taken to the next sign-up page. 5. Create a [employer] ID. This will be your [employer] login ID and cannot be changed, so think of one that is secure and easy to remember. 6. Create a [employer] password. You can change your password at any time. 7. Enter your Password Reset Question and Answer. This can be used at a later time if you forget your password. 8. Enter your e-mail address. You will receive e-mail notification when new information is available in [facility] 9. Click Sign Up. You can now view and download portions of your medical record. 10. Click the Download Summary menu link to download a portable copy of your medical information. If you have questions, please visit the Frequently Asked Questions section of the [employer] website. Remember, [employer] is NOT to be used for urgent needs. For medical emergencies, dial 911. Now available from your iPhone and Android! Introducing Manuel Abdul As a [employer] patient, I wanted to make you aware of our electronic visit tool called Manuel Abdul. [employer] 24/7 allows you to connect within minutes with a medical provider 24 hours a day, seven days a week via a mobile device or tablet or logging into a secure website from your computer. You can access Manuel Abdul from anywhere in the [place]. A virtual visit might be right for you when you have a simple condition and feel like you just dont want to get out of bed, or cant get away from work for an appointment, when your regular [employer] provider is not available (evenings, weekends or holidays), or when youre out of town and need minor care. Electronic visits cost only $49 and if the [employer] 24/7 provider determines a prescription is needed to treat your condition, one can be electronically transmitted to a nearby pharmacy*. Please take a moment to enroll today if you have not already done so. The enrollment process is free and takes just a few minutes.   To enroll, please download the [employer] 24/7 warren to your tablet or phone, or visit [email or web address] to enroll on your computer. And, as an [address] [place] [address] patient with a [employer] account, the results of your visits will be scanned into your electronic medical record and your primary care provider will be able to view the scanned results. We urge you to continue to see your regular [employer] provider for your ongoing medical care. And while your primary care provider may not be the one available when you seek a [employer] virtual visit, the peace of mind you get from getting a real diagnosis real time can be priceless. For more information on [employer], view our Frequently Asked Questions (FAQs) at [email or web address]. Sincerely, 
 
Pancho Perdue MD 
Chief Medical Officer Gloria Ho *:  certain medications cannot be prescribed via SiteWit Providers Seen During Your Hospitalization Provider Specialty Primary office phone Aren Davila MD Geriatric Medicine [phone] Immunizations Administered for This Admission Name Date Influenza Vaccine (Quad) PF 10/2/2018 Your Primary Care Physician (PCP) Primary Care Physician Office Phone Office Fax Angel Bowenssydney [phone] You are allergic to the following Allergen Reactions Demerol (Meperidine) Other (comments) Oral pain Erythromycin Other (comments) Nitrofurantoin Other (comments) Mouth pain   
    
 Pcn (Penicillins) Other (comments) Oral pain   
    
 Sulfa (Sulfonamide Antibiotics) Other (comments) Oral pain Recent Documentation Height Weight Breastfeeding? BMI OB Status Smoking Status 1.626 m 58.2 kg No 22.01 kg/m2 Hysterectomy Never Smoker Emergency Contacts Name Discharge Info Relation Home Work Mobile Ag Snyder DISCHARGE CAREGIVER [3] Child [2] [phone] Patient Belongings The following personal items are in your possession at time of discharge: 
  Dental Appliances: None  Visual Aid: With patient, Glasses      Home Medications: None   Jewelry: Ring (1 gold ring)  Clothing: Pants, Shirt, Slippers, Undergarments (1 bra only)    Other Valuables: Porter Ridley, Anabell, With patient Please provide this summary of care documentation to your next provider. Signatures-by signing, you are acknowledging that this After Visit Summary has been reviewed with you and you have received a copy. Patient Signature:  ____________________________________________________________ Date:  ____________________________________________________________  
  
Marita Bone Provider Signature:  ____________________________________________________________ Date:  ____________________________________________________________

## 2018-09-24 NOTE — PROGRESS NOTES
Assumed care of pt from Michelle Galvan RN pt awake in bed A&O x 4 , no distress noted and denies pain. Frequently used items and call bell within reach. Patient verbalized understanding to use call bell for any needs or assistance. Bed locked in lowest position. 1250 This nurse attempted to call report. 1305 Report given to TOBY Slater.

## 2018-09-24 NOTE — ROUTINE PROCESS
Verbal shift change report given to VIRIDIANA Resendez (oncoming nurse) by Divina Sandhu (offgoing nurse). Report included the following information SBAR, Intake/Output and Recent Results.

## 2018-09-24 NOTE — PROGRESS NOTES
Patient arrive via wheel chair, no o2 support, accompanied by TOBY Schulte Tele nurse, received patient's belongings in a white bag with old clothes and hospital paper work, patient's purse and duffel bag.

## 2018-09-24 NOTE — PROGRESS NOTES
[facility] to Nke[place] [age] y.o.   female [address] [facility]   Room: [address] [place] [address] 3S CARDIAC TELE  Unit Phone# :  [phone] [facility] 3S CARDIAC TELE 
[address] [address] Dept: [phone] Loc: [phone] SITUATION Admitted:  9/20/2018         Attending Provider:  Dale Claire MD    
 
Consultations:  IP CONSULT TO PRIMARY CARE PROVIDER 
 
PCP:  Anne Marie Schroeder MD   [phone] Treatment Team: Attending Provider: Dale Claire MD; Consulting Provider: Dale Claire MD; Care Manager: Royer Paniagua RN; Utilization Review: Phill Coelho RN; Physical Therapist: Gissel Interiano Admitting Dx:  New onset atrial fibrillation ([facility] [address] Principal Problem: <principal problem not specified> * No surgery found * of  
  
BY: * Surgery not found *             ON: * No surgery found * Code Status: Prior Advance Directives:  
Advance Care Planning 9/20/2018 Patient's Healthcare Decision Maker is: Legal Next of Kin Primary Decision Maker Name James Kraft Primary Decision Maker Phone Number [phone]. cell [phone] Primary Decision Maker Relationship to Patient Adult child Secondary Decision Maker Name - Secondary Decision Maker Phone Number - Secondary Decision Maker Relationship to Patient -  
Confirm Advance Directive None Patient Would Like to Complete Advance Directive No  
Does the patient have other document types - (Send w/patient) Not Received Isolation:  There are currently no Active Isolations       MDRO: No current active infections Pain Medications given:  n/a    Last dose: 9/24/2018 Special Equipment needed: no  Type of equipment: 
 
(Not currently on dialysis) BACKGROUND Allergies: Allergies Allergen Reactions  Demerol [Meperidine] Other (comments) Oral pain  Erythromycin Other (comments)  Nitrofurantoin Other (comments) Mouth pain  Pcn [Penicillins] Other (comments) Oral pain  Sulfa (Sulfonamide Antibiotics) Other (comments) Oral pain Past Medical History:  
Diagnosis Date  Coronary artery disease  Degenerative joint disease  Degenerative spine disease  Dyslipidemia  Hypertension  Hypertension  Kidney stone  Osteoarthritis Past Surgical History:  
Procedure Laterality Date  HX APPENDECTOMY  HX HYSTERECTOMY Prescriptions Prior to Admission Medication Sig  
 traMADol (ULTRAM) 50 mg tablet Take 1 Tab by mouth every eight (8) hours as needed for Pain. Max Daily Amount: 150 mg.  
 acetaminophen (TYLENOL EXTRA STRENGTH) 500 mg tablet Take 2 Tabs by mouth every six (6) hours as needed for Pain.  ferrous sulfate (IRON) 325 mg (65 mg iron) tablet Take  by mouth Daily (before breakfast).  diclofenac potassium (CATAFLAM) 50 mg tablet Take 50 mg by mouth two (2) times a day.  metoprolol (LOPRESSOR) 25 mg tablet Take 1 Tab by mouth every twelve (12) hours. Indications: HYPERTENSION (Patient taking differently: Take 50 mg by mouth every twelve (12) hours. Indications: hypertension)  atorvastatin (LIPITOR) 40 mg tablet Take 1 Tab by mouth nightly.  ticagrelor (BRILINTA) 90 mg tablet Take 1 Tab by mouth two (2) times a day.  valsartan (DIOVAN) 320 mg tablet Take 0.5 Tabs by mouth daily. Indications: HYPERTENSION  aspirin delayed-release 81 mg tablet Take 1 Tab by mouth daily.  nitroglycerin (NITROSTAT) 0.4 mg SL tablet 1 Tab by SubLINGual route every five (5) minutes as needed for Chest Pain.  polyethylene glycol (MIRALAX) 17 gram packet Take 1 Packet by mouth daily.  pantoprazole (PROTONIX) 40 mg tablet Take 40 mg by mouth daily. Indications: GASTROESOPHAGEAL REFLUX  ergocalciferol (VITAMIN D2) 50,000 unit capsule Take 50,000 Units by mouth. Hard scripts included in transfer packet yes Vaccinations: There is no immunization history on file for this patient. Readmission Risks:    Known Risks: N/A The Charlson CoMorbitiy Index tool is an evidenced based tool that has more automatic generated information. The tool looks at many different items such as the age of the patient, how many times they were admitted in the last calendar year, current length of stay in the hospital and their diagnosis. All of these items are pulled automatically from information documented in the chart from various places and will generate a score that predicts whether a patient is at low (less than 13), medium (13-20) or high (21 or greater) risk of being readmitted. ASSESSMENT Temp: 98.7 °F (37.1 °C) (09/24/18 0907) Pulse (Heart Rate): 77 (09/24/18 0907) Resp Rate: 18 (09/24/18 0907)           BP: 169/67 (09/24/18 3398) O2 Sat (%): 96 % (09/24/18 0907) Weight: 58.4 kg (128 lb 12 oz)    Height: 5' 4\" (162.6 cm) (09/21/18 0429) If above not within 1 hour of discharge: 
 
BP:_____  P:____  R:____ T:_____ O2 Sat: ___%  O2: ______ Active Orders Diet DIET REGULAR Orientation: oriented to time, place, person and situation Active Behaviors: None Active Lines/Drains:  (Peg Tube / Mehta / CL or S/L?): no 
 
Urinary Status: Voiding     Last BM: Last Bowel Movement Date: 09/23/18 Mobility: Slightly limited Weight Bearing Status: WBAT (Weight Bearing as Tolerated) Gait Training Assistive Device: Walker, rolling Ambulation - Level of Assistance: Contact guard assistance Distance (ft): 75 Feet (ft) (+ 75 feet with sit rest between) Lab Results Component Value Date/Time  Glucose 104 (H) 09/20/2018 11:12 AM  
 INR 1.0 09/20/2018 11:12 AM  
 HGB 10.6 (L) 09/20/2018 11:12 AM  
 HGB 10.8 (L) 04/09/2018 06:24 PM  
  
  RECOMMENDATION  
 
 See After Visit Summary (AVS) for: · Discharge instructions · After [facility] · Special equipment needed (entered pre-discharge by Care Management) · Medication Reconciliation · Follow up Appointment(s) Report given/sent by:  Karen Yee Verbal report given to: Roslyn Camacho Estimated discharge time:  9/24/2018 at 1314

## 2018-09-24 NOTE — IP AVS SNAPSHOT
[facility] 
 
 
 [address] [facility] Roshni Bajwa Patient: [facility] ERNESTINE MRN: [id number] CGT:6[birth year] A check john indicates which time of day the medication should be taken. My Medications CHANGE how you take these medications Instructions Each Dose to Equal  
 Morning Noon Evening Bedtime  
 metoprolol tartrate 25 mg tablet Commonly known as:  LOPRESSOR What changed:  how much to take Your last dose was: Your next dose is: Take 1 Tab by mouth every twelve (12) hours. Indications: hypertension 25 mg CONTINUE taking these medications Instructions Each Dose to Equal  
 Morning Noon Evening Bedtime  
 acetaminophen 500 mg tablet Commonly known as:  [address] [facility] Your last dose was: Your next dose is: Take 2 Tabs by mouth every six (6) hours as needed for Pain. 1000 mg  
    
   
   
   
  
 apixaban 2.5 mg tablet Commonly known as:  Denny Hearing Your last dose was: Your next dose is: Take 1 Tab by mouth two (2) times a day. 2.5 mg  
    
   
   
   
  
 atorvastatin 40 mg tablet Commonly known as:  LIPITOR Your last dose was: Your next dose is: Take 1 Tab by mouth nightly. 40 mg  
    
   
   
   
  
 dilTIAZem  mg ER capsule Commonly known as:  CARDIZEM CD Your last dose was: Your next dose is: Take 1 Cap by mouth daily. 180 mg  
    
   
   
   
  
 nitroglycerin 0.4 mg SL tablet Commonly known as:  NITROSTAT Your last dose was: Your next dose is:    
   
   
 1 Tab by SubLINGual route every five (5) minutes as needed for Chest Pain. 0.4 mg  
    
   
   
   
  
 polyethylene glycol 17 gram packet Commonly known as:  Marco A Lota Your last dose was: Your next dose is: Take 1 Packet by mouth daily. 17 g PROTONIX 40 mg tablet Generic drug:  pantoprazole Your last dose was: Your next dose is: Take 40 mg by mouth daily. Indications: GASTROESOPHAGEAL REFLUX  
 40 mg  
    
   
   
   
  
 traMADol 50 mg tablet Commonly known as:  ULTRAM  
   
Your last dose was: Your next dose is: Take 1 Tab by mouth every eight (8) hours as needed for Pain. Max Daily Amount: 150 mg.  
 50 mg  
    
   
   
   
  
 VITAMIN D2 50,000 unit capsule Generic drug:  ergocalciferol Your last dose was: Your next dose is: Take 50,000 Units by mouth. 6[address] Units Where to Get Your Medications Information on where to get these meds will be given to you by the nurse or doctor. ! Ask your nurse or doctor about these medications  
  apixaban 2.5 mg tablet  
 dilTIAZem  mg ER capsule  
 metoprolol tartrate 25 mg tablet  
 nitroglycerin 0.4 mg SL tablet

## 2018-09-24 NOTE — PROGRESS NOTES
Pt discharged to facility with no distress noted, accompanied by this nurse  via  stretcher to unit. Pt has recieved discharge instructions, along with  belongings. Voiced understanding of discharge instructions.

## 2018-09-24 NOTE — PROGRESS NOTES
conducted a Follow up consultation and Spiritual Assessment for [facility] ERNESTINE, who is a 80 y. o.,female. The  provided the following Interventions: 
Continued the relationship of care and support. Listened empathically. Offered prayer and assurance of continued prayer on patients behalf. Chart reviewed. The following outcomes were achieved: 
Patient expressed gratitude for pastoral care visit. Assessment: 
There are no further spiritual or Confucianist issues which require Spiritual Care Services interventions at this time. Plan: 
Chaplains will continue to follow and will provide pastoral care on an as needed/requested basis.  recommends bedside caregivers page  on duty if patient shows signs of acute spiritual or emotional distress. [facility] Staff  Spiritual Care  
[phone]

## 2018-09-25 PROCEDURE — 74011250637 HC RX REV CODE- 250/637: Performed by: INTERNAL MEDICINE

## 2018-09-25 RX ADMIN — APIXABAN 2.5 MG: 2.5 TABLET, FILM COATED ORAL at 17:44

## 2018-09-25 RX ADMIN — APIXABAN 2.5 MG: 2.5 TABLET, FILM COATED ORAL at 09:24

## 2018-09-25 RX ADMIN — ACETAMINOPHEN 650 MG: 325 TABLET, FILM COATED ORAL at 17:44

## 2018-09-25 RX ADMIN — DILTIAZEM HYDROCHLORIDE 180 MG: 180 CAPSULE, EXTENDED RELEASE ORAL at 09:24

## 2018-09-25 RX ADMIN — ATORVASTATIN CALCIUM 40 MG: 40 TABLET, FILM COATED ORAL at 22:08

## 2018-09-25 RX ADMIN — METOPROLOL TARTRATE 25 MG: 25 TABLET, FILM COATED ORAL at 17:44

## 2018-09-25 RX ADMIN — ACETAMINOPHEN 650 MG: 325 TABLET, FILM COATED ORAL at 09:24

## 2018-09-25 RX ADMIN — METOPROLOL TARTRATE 25 MG: 25 TABLET, FILM COATED ORAL at 09:24

## 2018-09-25 RX ADMIN — TRAMADOL HYDROCHLORIDE 50 MG: 50 TABLET, FILM COATED ORAL at 22:08

## 2018-09-25 RX ADMIN — PANTOPRAZOLE SODIUM 40 MG: 40 TABLET, DELAYED RELEASE ORAL at 09:24

## 2018-09-25 NOTE — PROGRESS NOTES
Verbal shift change report given to VIRIDIANA Resendez (oncoming nurse) by Marito Corona (offgoing nurse). Report included the following information SBAR, Intake/Output and Recent Results.

## 2018-09-25 NOTE — PROGRESS NOTES
Problem: Mobility Impaired (Adult and Pediatric)  Goal: *Acute Goals and Plan of Care (Insert Text)  PHYSICAL THERAPY STG GOALS :  Initiated 9/25/2018 and to be accomplished within 2 Weeks    1. Patient will move from supine to sit and sit to supine  and roll side to side in bed with supervision/set-up. 2.  Patient will transfer from bed to chair and chair to bed with stand by assistance using RW. 3.  Patient will perform sit to stand with stand by assistance using RW with Fair+ balance and safety awareness. 4.  Patient will ambulate with stand by assistance for 150 feet with RW on level surfaces with 2 turns. 5.  Patient will ascend/descend 3 stairs with right handrail(s) with contact guard assist to allow for safe home access/exit. 6.  Patient will improve standardized test score for [place] Standing Balance Scale 3 to reduce fall risk. PHYSICAL THERAPY LTG GOALS :  Initiated 9/25/2018 and to be accomplished within 4 Weeks    1. Patient will move from supine to sit and sit to supine  and roll side to side in bed with modified independence. 2.  Patient will transfer from bed to chair and chair to bed with modified independence using LRAD. 3.  Patient will perform sit to stand with modified independence with Good balance and safety awareness. 4.  Patient will ambulate with supervision/set-up for 200 feet with LRAD on level surfaces and be able to maneuver through narrow spaces and obstacles without loss of balance. 5.  Patient will ascend/descend 3 stairs with right handrail(s) with stand by assistance to allow for safe home access/exit. 6.  Patient will improve standardized test score for [place] Standing Balance Scale 4 to reduce fall risk.       Physical Therapist:   Susan Zabala, PT  on 9/25/2018           [address] [place] [address]   physical Therapy EVALUATION    Patient: Debora Lerma (89 y.o. female)                Start of Care Date: 9/25/2018   Referred by : Aren Davila MD Precautions: Fall   Treatment Diagnosis: Difficulty in Walking    History:  Patient was admitted to [facility] on 9/20/18 with c/o weakness, dizziness, and chest palpitations. Dx of Atrial fibrillation with RVR. Upon acute d/c, she was unsafe to return home and was transferred to [facility] for skilled physical therapy services. History of present problem reveals HIGH Complexity :3+ comorbidities / personal factors will impact the outcome/ POC . Past Medical history includes:   Past Medical History:   Diagnosis Date    Coronary artery disease     Degenerative joint disease     Degenerative spine disease     Dyslipidemia     Hypertension     Hypertension     Kidney stone     Osteoarthritis      Past Surgical History:   Procedure Laterality Date    HX APPENDECTOMY      HX HYSTERECTOMY          Cognitive Status:  Mental Status  Neurologic State: Alert; Appropriate for age  Orientation Level: Oriented X4  Cognition: Follows commands  Perception: Appears intact  Perseveration: No perseveration noted  Safety/Judgement: Fall prevention  Barriers to Learning/Limitations: None  Compensate with: visual, verbal, tactile, kinesthetic cues/model  Prior Level of Function/Home Situation and Assitance recieved:  Home Situation  Home Environment: Private residence  # Steps to Enter: 3  Rails to Enter: Yes  Hand Rails : Bilateral (uses RHR)  One/Two Story Residence: One story  Living Alone: Yes  Support Systems: Friends \ neighbors  Patient Expects to be Discharged to[de-identified] Private residence  Current DME Used/Available at Home: Grab bars, Safety frame [employer], Shower chair (grab bar for step in & out)  Level of Assistance received at Home:   Prior to this current hospitalization, the patient required use of quad cane for ambulation in home, mod (I) for ADLs with neighbors assisting with grocery shopping.    Justification for Evaluation complexity:  Patient is referred to Skilled Physical Therapy services due a functional decline in strength, endurance, mobility, gait, stair negotiation, balance and required an overall HIGH  complexity evaluation examination. Eval Complexity: History: HIGH Complexity :3+ comorbidities / personal factors will impact the outcome/ POC Exam:HIGH Complexity : 4+ Standardized tests and measures addressing body structure, function, activity limitation and / or participation in recreation  Presentation: MEDIUM Complexity : Evolving with changing characteristics    OBJECTIVE DATA SUMMARY:     Vital Signs:  Resting BP:  BP: 168/57  HR: Pulse (Heart Rate): 75    Pain:  Pain Screen  Pain Scale 1: Numeric (0 - 10)  Pain Intensity 1: 0  Patient Stated Pain Goal: 0  Gross Assessment:  Gross Assessment  AROM: Generally decreased, functional  Strength: Generally decreased, functional (R hip 3-/5, R knee 4+5; LLE grossly 4+/5)  Coordination: Generally decreased, functional  Tone: Normal   Bed Mobility:  Bed Mobility  Rolling:  (not assessed)  Balance:  Balance  Sitting: Without support  Sitting - Static: Good (unsupported)  Sitting - Dynamic: Fair (occasional) (+)  Standing: With support  Standing - Static: Fair  Standing - Dynamic : Fair  Transfers:  Sit to Stand: Contact guard assistance  Gait:  Gait  Base of Support: Center of gravity altered  Speed/Mckenna: Pace decreased (<100 feet/min)  Step Length: Left shortened;Right shortened  Gait Abnormalities: Decreased step clearance; Toe walking  Ambulation - Level of Assistance: Contact guard assistance  Distance (ft): 120 Feet (ft)  Assistive Device: Gait belt;Walker, rolling  Gait Description (WDL): Exceptions to WDL        With 4 turns. Wheelchair Management:    N/A  Assessment:   Clinical Decision Making:High Complexity , using standardized patient assessment instrument and /or measurable assessement of functional outcome of [facility] Standing Balance Scale, score of 2.  0: Pt performs 25% or less of standing activity (Max assist) CN, 100% impaired.   1: Pt supports self with upper extremities but requires therapist assistance. Pt performs 25-50% of effort (Mod assist) CM, 80% to <100% impaired. 1+: Pt supports self with upper extremities but requires therapist assistance. Pt performs >50% effort. (Min assist). CL, 60% to <80% impaired. 2: Pt supports self independently with both upper extremities (walker, crutches, parallel bars). CL, 60% to <80% impaired. 2+: Pt support self independently with 1 upper extremity (cane, crutch, 1 parallel bar). CK, 40% to <60% impaired. 3: Pt stands without upper extremity support for up to 30 seconds. CK, 40% to <60% impaired. 3+: Pt stands without upper extremity support for 30 seconds or greater. CJ, 20% to <40% impaired. 4: Pt independently moves and returns center of gravity 1-2 inches in one plane. CJ, 20% to <40% impaired. 4+: Pt independently moves and returns center of gravity 1-2 inches in multiple planes. CI, 1% to <20% impaired. 5: Pt independently moves and returns center of gravity in all planes greater than 2 inches. CH, 0% impaired. Sandra Dawson Positioning needs/Additional Comments :  Pt presents sitting in w/c, alert and oriented x 4. Sit <> stand transfers with CGA. Gait training x 120 ft using RW with CGA, toe walking on RLE. Pt credits toe walking on R to RLE being shorter than LLE. Audible clicking of R hip is noted during R stance phase. TE rendered to address strength, endurance, mobility and included heel raises, toe raises, marching 10 reps x 2 sets; LAQ 15 reps x 2 sets with visual and verbal cueing for proper techniques. HEP education to include said exercises to address circulation, strength, endurance. Education on role of physical therapy, pt denied having any questions. Pt's personal goals are to \"get some strength and energy because I get tired quick. You know hold age gets to you. \" Safety awareness and fall prevention education to include use of call bell, pt verbalized understanding.  Recommend skilled physical therapy services to address deficits, restore function, and for safe return home. TREATMENT PLAN: Rehab Potential : Good  Skilled Physical Therapy Services may include:   [x]           Bed Mobility Training             [x]    Neuromuscular Re-Education  [x]           Transfer Training                   [x]    Orthotic/Prosthetic Training  [x]           Gait Training                          [x]    Modalities  [x]           Therapeutic Procedures        [x]    Manual Therapy  [x]           Therapeutic Activities            [x]    Patient and Family Training/Education  []           Other (comment):     Frequency/Duration: Patient will be followed by physical therapy for 1-2 times a day for a minimum of 3 days per week for 4 weeks to address goals. Discharge Recommendations: Home Health  Patient's expected Discharge Location:  [x]Private Residence  [] long term/ILF  []LTC  []Other:     COMMUNICATION/EDUCATION:  Patient/Family Agreement with Treatment Plan :     [x]         The PT evaluation/POC has been reviewed with PT assistant. [x]         Fall prevention education was provided and the patient/caregiver indicated understanding. [x]         Patient/family have participated as able in goal setting and plan of care and Patient/family agree to work toward stated goals and plan of care. []         Patient is unable to participate in goal setting and plan of care. Therapist/SW will contact family/POA. Treatment session:  Overall Complexity: MEDIUM Evaluation:  10 mins./ Treatment:  40 mins.   Physical Therapist:   Abiel Blum, PT       9/25/2018   Thank you for this referral.

## 2018-09-25 NOTE — PROGRESS NOTES
The patient was offered a group activity of listening to an audio book ([address] [place] [address]) on September 25, 2018. The patient declined this activity. The  will continue to offer group and 1:1 activities and encourage the patient to participate in the activities.

## 2018-09-25 NOTE — PROGRESS NOTES
Nutrition initial assessment/Plan of care tcc     RECOMMENDATIONS:     1. Cardiac Diet  2. Monitor weight, labs and PO intake  3. RD to follow     GOALS:     1. PO intake meets >75% of protein/calorie needs by 10/2  2. Weight Maintenance (+/- 1-2 lb) by 10/2     ASSESSMENT:     Weight status is classified as normal per BMI of 22.2. However, patient is at nutrition risk due to BMI below 23 with patient above 72years of age. PO intake is adequate. No new labs. Nutrition recommendations listed. RD to follow. Nutrition Diagnoses:   No nutrition diagnosis at this time. Nutrition Risk:  [] High  [] Moderate [x]  Low    SUBJECTIVE/OBJECTIVE:      (9/21): Pt admitted with atrial fibrillation. Patient reports having a good appetite and eating all of meals. Observed 100% intake of lunch meal during visit. States weight of 145 lb back in 1992 but weight has recently been stable at 125 lb for several years. Denies food allergies and problems with chewing/swallowing. Will monitor.    (9/25): Pt seen in room OOB in chair at dinner. Reports having a good appetite and consuming more food here than she does at home. No issues to address at this time. Will monitor.      Information Obtained from:    [x] Chart Review   [x] Patient   [] Family/Caregiver   [] Nurse/Physician   [] Interdisciplinary Meeting/Rounds    Diet: Cardiac Diet  Medications: [x] Reviewed   Allergies: [x] Reviewed   Patient Active Problem List   Diagnosis Code    Bilateral nephrolithiasis N20.0    Renal cyst, left N28.1    New onset atrial fibrillation (HCC) I48.91    HTN (hypertension) I10    CAD (coronary artery disease) I25.10    Hypercholesteremia E78.00    GERD (gastroesophageal reflux disease) K21.9    Stomatitis and mucositis K12.1, K12.30       Past Medical History:   Diagnosis Date    Coronary artery disease     Degenerative joint disease     Degenerative spine disease     Dyslipidemia     Hypertension     Hypertension     Kidney stone     Osteoarthritis       Labs:    Lab Results   Component Value Date/Time    Sodium 141 09/20/2018 11:12 AM    Potassium 4.3 09/20/2018 11:12 AM    Chloride 106 09/20/2018 11:12 AM    CO2 27 09/20/2018 11:12 AM    Anion gap 8 09/20/2018 11:12 AM    Glucose 104 (H) 09/20/2018 11:12 AM    BUN 26 (H) 09/20/2018 11:12 AM    Creatinine 1.19 09/20/2018 11:12 AM    Calcium 8.9 09/20/2018 11:12 AM    Magnesium 1.9 09/03/2011 08:15 PM    Albumin 3.7 04/09/2018 06:24 PM     Anthropometrics: BMI (calculated): 22.2  Last 3 Recorded Weights in this Encounter    09/24/18 1632   Weight: 58.7 kg (129 lb 6.4 oz)      Ht Readings from Last 1 Encounters:   09/24/18 5' 4\" (1.626 m)     No data found. IBW: 120 lb %IBW: 107% UBW: 125 lb %UBW: 103%   [] Weight Loss [] Weight Gain [x] Weight Stable    Estimated Nutrition Needs: [x] MSJ    Calories: 0781-0106 Kcal Based on:   [x] Actual BW    Protein:   60-70 g Based on:   [x] Actual BW    Fluid:       1500 ml Based on:   [x] Actual BW      [x] No Cultural, Church or ethnic dietary need identified.     [] Cultural, Church and ethnic food preferences identified and addressed     Wt Status:  [x] Normal (18.6 - 24.9) [] Underweight (<18.5) [] Overweight (25 - 29.9) [] Mild Obesity (30 - 34.9)  [] Moderate Obesity (35 - 39.9) [] Morbid Obesity (40+)     Nutrition Problems Identified:   [] Suboptimal PO intake   [] Food Allergies  [] Difficulty chewing/swallowing/poor dentition  [] Constipation/Diarrhea   [] Nausea/Vomiting   [x] None  [] Other:     Plan:   [x] Therapeutic Diet  []  Obtained/adjusted food preferences/tolerances and/or snacks options   []  Supplements added   [] Occupational therapy following for feeding techniques  []  HS snack added   []  Modify diet texture   []  Modify diet for food allergies    []  Assist with menu selection   [x]  Monitor PO intake on meal rounds   [x]  Continue inpatient monitoring and intervention   [x]  Participated in discharge planning/Interdisciplinary rounds/Team meetings   []  Other:     Education Needs:   [] Not appropriate for teaching at this time due to:   [x] Identified and addressed    Nutrition Monitoring and Evaluation:  [x] Continue ongoing monitoring and intervention  [] Other    Carmel Burks

## 2018-09-25 NOTE — PROGRESS NOTES
Late entry: SW met with pt to complete the social evaluation. Pt was admitted to [facility] on 9/20/18 due to Atrial Fibrillation. Pt lives  alone and reported that she used a cane for mobility and was independent with adl's. She has a  biweekly and a someone to take care of her lawn. Her goal in rehab is \" to get my strength back\". SW informed pt of the rehab process and MD visits. Pt was alert and oriented and seems motivated for rehab. Pt requested assistance with getting assistance at home. Stated that she has a long term care policy that she has been paying on since she was 76years old and \" it's time to use it\". JAMILA informed pt that SW has brochures on agencies that are familiar with long term care policies and that JAMILA can assist with contacting them to arrange a time to meet to discuss their services. Pt was receptive to this. JAMILA agreed to meet with pt again tomorrow to contact agencies. JAMILA discussed Assisted Living with pt. Pt stated that she wasn't interested in [facility] because \" I have too much to do\". JAMILA requested that pt clarify what she had to at home and she replied \" take care of bills and my home\". JAMILA will follow.

## 2018-09-25 NOTE — ROUTINE PROCESS
Bedside and verbal shift report given to BOLA Guerrero LPN (oncoming nurse) by MARTHA Thomas LPN (off going nurse). Report included SBAR, MAR and Kardex.

## 2018-09-25 NOTE — PROGRESS NOTES
conducted an initial consultation and Spiritual Assessment for [place], who is a 80 y. o.,female. Patients Primary Language is: [place]. According to the patients EMR Scientologist Affiliation is: [facility].     The reason the Patient came to the hospital is:   Patient Active Problem List    Diagnosis Date Noted    HTN (hypertension) 09/21/2018    CAD (coronary artery disease) 09/21/2018    Hypercholesteremia 09/21/2018    GERD (gastroesophageal reflux disease) 09/21/2018    Stomatitis and mucositis 09/21/2018    New onset atrial fibrillation ([facility] [address] 09/20/2018    Renal cyst, left 05/15/2018    Bilateral nephrolithiasis 04/12/2018        The  provided the following Interventions:  Initiated a relationship of care and support. Explored issues of pancho, spirituality and/or Yazidi needs while hospitalized. Listened empathically. Provided chaplaincy education. Provided information about Spiritual Care Services. Offered prayer and assurance of continued prayers on patient's behalf. Chart reviewed. The following outcomes were achieved:  Patient shared some information about their medical narrative and spiritual journey/beliefs. Patient processed feeling about current hospitalization. Patient expressed gratitude for the 's visit. Assessment:  Patient did not indicate any spiritual or Yazidi issues which require Spiritual Care Services interventions at this time. Patient does not have any Yazidi/cultural needs that will affect patients preferences in health care. Plan:  Chaplains will continue to follow and will provide pastoral care on an as needed or requested basis.  recommends bedside caregivers page  on duty if patient shows signs of acute spiritual or emotional distress.     [address] [facility]   Staff [address] [facility]   [phone]

## 2018-09-25 NOTE — ROUTINE PROCESS
Bedside and verbal shift report given to  Kamila RN (oncoming nurse) by Tonny Mauro LPN (off going nurse). Report included SBAR, MAR and Kardex.

## 2018-09-25 NOTE — PROGRESS NOTES
Problem: Self Care Deficits Care Plan (Adult)  Goal: *Acute Goals and Plan of Care (Insert Text)  OCCUPATIONAL THERAPY SHORT TERM GOALS    Initiated 9/25/2018 and to be accomplished within 2 Week(s)    1. Patient will perform Upper body ADL's with adaptive equipment with supervision/set-up. 2.  Patient will perform Lower body ADL's with adaptive equipment as needed with moderate assistance  . 3. Patient will perform toileting task with standby assist with Good safety to reduce falls risk. 4.  Patient will perform functional transfers with Rolling Walker and supervision/set-up. 5.  Patient will perform grooming task in standing for 5 minutes using RW with SBA and Good safety awareness. 6.  Patient will improve Barthel index scores to atleast 65/100 to improve functional mobility. 7.  Patient will utilize energy conservation techniques during functional activities with minimal verbal cues. OCCUPATIONAL THERAPY LONG TERM GOALS   Initiated 9/25/2018 and to be accomplished within 4 Week(s)    1. Patient will perform ADL's & IADLs with adaptive equipment as needed with modified independence. 2.  Patient will perform toileting task with modified independence with Good safety to reduce falls risk. 3.  Patient will perform all functional transfers utilizing least restrictive device and durable medical equipment as needed with modified independence and Good balance and safety awareness. 4.  Patient will perform standing static/dynamic activity for improved ADL/IADL function with modified independence and Good balance and safety awareness. 5.  Patient will improve Barthel index score to 95/100 to improve independence with mobility. 6. Patient will perform home making tasks and simple meal prep Mod I utilizing energy conservation techniques and good safety awareness.       Therapist: Claudia George    9/25/2018         [address] [place] [address]   Occupational Therapy EVALUATION      Patient: [facility] (80 y.o. female)            Start of Care Date: 9/25/2018                         Onset Date:  9/20/18  Referred by : Jordy Villalobos MD         Primary Diagnosis: afib       Treatment Diagnosis  Treatment Diagnosis: increased need for assist with self care  Treatment Diagnosis 2: muscle weakness     Patient is a 80 y.o. Female admitted to [facility] 9/20/18 due to c/o weakness, dizziness, chest palpations and face felt funny. Patient diagnosed with new onset Afib and increased blood pressure. Patient unsafe to discharge home and transferred to [address] [place] [address],[address] and referred to skilled Occupational therapy in order to reach maximal functional potential for safe discharge home at [facility]. Precautions: Fall, Afib-monitor vitals. Eval Complexity: History: HIGH Complexity : Extensive review of history including physical, cognitive and psychosocial history . History of present problem reveals HIGH Complexity :3+ comorbidities / personal factors will impact the outcome/ POC . Past Medical history includes:   Past Medical History:   Diagnosis Date    Coronary artery disease     Degenerative joint disease     Degenerative spine disease     Dyslipidemia     Hypertension     Hypertension     Kidney stone     Osteoarthritis      Past Surgical History:   Procedure Laterality Date    HX APPENDECTOMY      HX HYSTERECTOMY        Cognitive Status:  Mental Status  Neurologic State: Alert; Appropriate for age  Orientation Level: Oriented X4  Cognition: Follows commands  Perception: Appears intact  Perseveration: No perseveration noted  Safety/Judgement: Fall prevention  Barriers to Learning/Limitations: None  Compensate with: visual, verbal, tactile, kinesthetic cues/model  Justification for Evaluation complexity:   HIGH Complexity : Patient presents with comorbidities that affect occupational performance.  Signifigant modification of tasks or assistance (eg, physical or verbal) with assessment (s) is necessary to enable patient to complete evaluation . Patient is referred to [address] due to a functional decline in strength, balance, coordination, safety awareness and functional activity tolerance, which is inhibiting independence w/ self-care performance skills and functional mobility. Prior Level of Function/Home Situation:   Home Situation  Home Environment: Private residence  # Steps to Enter: 3  Rails to Enter: Yes  Hand Rails : Bilateral (uses R rail)  One/Two Story Residence: One story  Living Alone: Yes  Support Systems: Friends \ neighbors  Patient Expects to be Discharged to[de-identified] Private residence  Current DME Used/Available at [facility]: Grab bars, Safety frame [employer], Shower chair, Rod Jane aides, Cane, quad (grab bar for step in & out)  Tub or Shower Type: Tub/Shower combination  Level of Assistance received at Home: Prior to this current hospitalization, the patient reports she was Mod I with ADLs (used AE: sock aide and reacher for LB dressing), IADLs, meal prep, homemaking tasks, functional mobility w/ QC, owns a walker, w/c, 3 in 1 commode over toilet, shower chair, transfer tub grab bar. Does not drive. OBJECTIVE DATA SUMMARY:     Vital Signs:  Resting BP:  BP: 168/57  HR: Pulse (Heart Rate): 75     Pain:  Pain Screen  Pain Scale 1: Numeric (0 - 10)  Pain Intensity 1: 0  Patient Stated Pain Goal: 0  Auditory:  Auditory  Auditory Impairment: None  Examination:   HIGH Complexity : 5 or more performance deficits relating to physical, cognitive , or psychosocial skils that result in activity limitations and / or participation restrictions; Tone and Sensation:  Tone: Normal (BUEs)              Sensation: Intact (BUEs)               Visual Perceptual:  Vision  Corrective Lenses: Contacts    Coordination:  Coordination  Fine Motor Skills-Upper: Left Intact; Right Intact  Gross Motor Skills-Upper: Left Intact; Right Intact  Coordination: Generally decreased, functional (BUEs)  Fine Motor Skills-Upper: Left Intact; Right Intact    Gross Motor Skills-Upper: Left Intact; Right Intact  Bed Mobility:  Bed Mobility  Rolling:  (not assessed)  Rolling:  (not assessed)  Transfers:  Functional Transfers  Sit to Stand: Contact guard assistance  Stand to Sit: Contact guard assistance  Toilet Transfer : Contact guard assistance (w/ RW & 3 in 1 commode over toilet)  Balance:  Balance  Sitting: Without support  Sitting - Static: Good (unsupported)  Sitting - Dynamic: Fair (occasional) (+)  Standing: With support  Standing - Static: Fair  Standing - Dynamic : Fair  Gross Assesment:  Gross Assessment  AROM: Generally decreased, functional (LUE shoulder flexion 90 degrees, RUE WFL)  PROM: Generally decreased, functional (LUE shoulder flexion 100 degrees)  Strength: Generally decreased, functional (BUEs 3+/5)  Coordination: Generally decreased, functional (BUEs)  Tone: Normal (BUEs)  Sensation: Intact (BUEs)  ADL self care:     ADL Intervention:  Upper Body Bathing  Bathing Assistance: Contact guard assistance  Upper Body Dressing Assistance  Dressing Assistance: Contact guard assistance  Lower Body Bathing  Bathing Assistance: Maximum assistance  Toileting  Toileting Assistance: Contact guard assistance  Bladder Hygiene: Contact guard assistance  Bowel Hygiene: Contact guard assistance  Clothing Management: Contact guard assistance  Grooming  Grooming Assistance: Contact guard assistance  Brushing Teeth: Contact guard assistance (in stance w/ RW)  Feeding  Feeding Assistance: Modified independent       Wheelchair Management:    max A  Instrumental ADL's:not assessed      ASSESSMENT:   A clinical decision making of HIGH  complexity was conducted, which includes an analysis of the Occupational profile, standardized assessments, data and treatment options.                                             THE BARTHEL INDEX    ACTIVITY   SCORE   FEEDING  0=unable  5=needs help cutting,spreading butter,etc., or modified diet  10= independent   10 BATHING  0=dependent  5=independent (or in shower   0   GROOMING  0=needs help  5=independent face/hair/teeth/shaving (implements provided)   5   DRESSING  0=dependent  5=needs help but can do about half unaided  10=independent(including buttons, zips,laces etc.)   5   BOWELS  0=incontinent  5=occasional accident  10=continent   10   BLADDER  0=incontinent, or catheterized and unable to manage alone  5=occasional accident  10=continent   5   TOILET USE  0=dependent  5=needs some help, but can do something alone  10=independent (on and off, dressing, wiping)   5   TRANSFER (BED TO CHAIR AND BACK)  0=unable, no sitting balance  5=major help(one or two people,physical), can sit  10=minor help(verbal or physical)  15=independent   10   MOBILITY (ON LEVEL SURFACES)  0=immobile or <50 yards  5=wheelchair independent,including corners,>50 yards  10=walkes with help of one person (verbal or physical) >50 yards  15=independent(but may use any aid; for example, stick) >50 yards   10   STAIRS  0=unable  5=needs help (verbal, physical, carrying aid)  10=independent   0             TOTAL:             60/100     Additional comments: Toilet transfer: CGA w/ RW, toilet tasks w/ CGA using grab bar and 3 in 1 commode over toilet for raised height. Washing hands in stance at sink w/ CGA. Patient participated in There act seated at tabletop incorporating functional reach and strengthening w/ good tolerance with distal 1# UE weights for 1/2 placement of pegs onto vertical peg board and removal of distal weights for remainder of peg placement to prevent discomfort in R shoulder w/ patient c/o new onset of R shoulder pain since admission, in which patient contributes to \" getting a chill because of being on blood thinner and aspirin\", following there act, patient had no c/o pain and/or discomfort. Patient educated on role of OT and POC; patient verbalized understanding. Pt.  Instructed on safety awareness with importance to utilize call bell to request assist with functional transfers to prevent falls, patient verbalized understanding and provided accurate demonstration. Dry erase communication board updated for accuracy w/ carryover for toilet transfers: CGA w/ RW & gait belt, therapist placed  3 in 1 commode over toilet. TREATMENT PLAN : Rehab Potential : Excellent  Skilled Occupational Therapy Services may include:   [x] Self Care/Home Mgmt                    []Cognitive Perceptual Re-training                              [x] Adaptive Equip Training                 [x]Therapeutic Exercise                  []Sensory Integration                           []Community Work Integration  [x]Therapeutic Activities                     []Other/modalities  [x]Neuromuscular Re-education         [x] Wheelchair Mgmt/propulsion    [x]Patient/Family/Staff Instruction      :  []Orthotics/Prosthetic Fitting & training     Frequency/Duration: Patient will be followed by Occupational therapy for 1-2 times a day for a minimum of 3 days per week for 4 weeks to address goals. Discharge Recommendations: Home Health  Patient expected Discharge Location: [x]Private Residence  [] [facility]  []LTC  [] [facility]     COMMUNICATION/EDUCATION:  Patient/Family Agreement with Treatment Plan :     [x]   OT Evaluation/POC has been reviewed with the KENNEY. [x]        Fall prevention education was provided and the patient/caregiver indicated understanding  [x]        Patient/family have participated as able in goal setting and plan of care. Patient/family agree to work toward stated goals and plan of care. []        Patient is unable to participate in goal setting and plan of care. Therapist will contact SW/POA. Treatment session:   Overall Complexity:HIGH  Evaluation:  39  Mins. Treatment :   55 mins.     Occupational Therapist:   Cornel Barron          9/25/2018   Thank You for this referral.

## 2018-09-26 PROCEDURE — 74011000250 HC RX REV CODE- 250: Performed by: INTERNAL MEDICINE

## 2018-09-26 PROCEDURE — 74011250637 HC RX REV CODE- 250/637: Performed by: INTERNAL MEDICINE

## 2018-09-26 RX ADMIN — ATORVASTATIN CALCIUM 40 MG: 40 TABLET, FILM COATED ORAL at 21:32

## 2018-09-26 RX ADMIN — POLYETHYLENE GLYCOL 3350 17 G: 17 POWDER, FOR SOLUTION ORAL at 08:51

## 2018-09-26 RX ADMIN — TRAMADOL HYDROCHLORIDE 50 MG: 50 TABLET, FILM COATED ORAL at 08:52

## 2018-09-26 RX ADMIN — DILTIAZEM HYDROCHLORIDE 180 MG: 180 CAPSULE, EXTENDED RELEASE ORAL at 08:52

## 2018-09-26 RX ADMIN — PANTOPRAZOLE SODIUM 40 MG: 40 TABLET, DELAYED RELEASE ORAL at 08:52

## 2018-09-26 RX ADMIN — METOPROLOL TARTRATE 25 MG: 25 TABLET, FILM COATED ORAL at 08:51

## 2018-09-26 RX ADMIN — METOPROLOL TARTRATE 25 MG: 25 TABLET, FILM COATED ORAL at 17:36

## 2018-09-26 RX ADMIN — APIXABAN 2.5 MG: 2.5 TABLET, FILM COATED ORAL at 17:36

## 2018-09-26 RX ADMIN — APIXABAN 2.5 MG: 2.5 TABLET, FILM COATED ORAL at 08:51

## 2018-09-26 NOTE — PROGRESS NOTES
Problem: Self Care Deficits Care Plan (Adult)  Goal: *Acute Goals and Plan of Care (Insert Text)  OCCUPATIONAL THERAPY SHORT TERM GOALS    Initiated 9/25/2018 and to be accomplished within 2 Week(s)    1. Patient will perform Upper body ADL's with adaptive equipment with supervision/set-up. 2.  Patient will perform Lower body ADL's with adaptive equipment as needed with moderate assistance  . 3. Patient will perform toileting task with standby assist with Good safety to reduce falls risk. 4.  Patient will perform functional transfers with Rolling Walker and supervision/set-up. 5.  Patient will perform grooming task in standing for 5 minutes using RW with SBA and Good safety awareness. 6.  Patient will improve Barthel index scores to atleast 65/100 to improve functional mobility. 7.  Patient will utilize energy conservation techniques during functional activities with minimal verbal cues. OCCUPATIONAL THERAPY LONG TERM GOALS   Initiated 9/25/2018 and to be accomplished within 4 Week(s)    1. Patient will perform ADL's & IADLs with adaptive equipment as needed with modified independence. 2.  Patient will perform toileting task with modified independence with Good safety to reduce falls risk. 3.  Patient will perform all functional transfers utilizing least restrictive device and durable medical equipment as needed with modified independence and Good balance and safety awareness. 4.  Patient will perform standing static/dynamic activity for improved ADL/IADL function with modified independence and Good balance and safety awareness. 5.  Patient will improve Barthel index score to 95/100 to improve independence with mobility. 6. Patient will perform home making tasks and simple meal prep Mod I utilizing energy conservation techniques and good safety awareness.       Therapist: Kosta Castellanos    9/25/2018          [facility]   Occupational Therapy Daily Treatment note      Patient: Claribel Page (89 y.o. female)       Date: 9/26/2018  Attending Physician: Xiomara Vazquez MD  Primary Diagnosis: afib    Treatment Diagnosis  Treatment Diagnosis: increased need for assist with self care  Treatment Diagnosis 2: muscle weakness   Precautions : Precautions at Admission: Fall  Vital Signs:  Vital Signs  Temp: 97.7 °F (36.5 °C)  Temp Source: Oral  Pulse (Heart Rate): 71  Resp Rate: 18  O2 Sat (%): 96 %  BP: 133/59     Cognitive Status:  Mental Status  Neurologic State: Alert  Orientation Level: Oriented X4  Cognition: Appropriate for age attention/concentration  Pain:  Pain Screen  Pain Scale 1: Numeric (0 - 10)  Pain Intensity 1: 9  Pain Location 1: Shoulder  Pain Orientation 1: Right  Pain Description 1: Aching  Pain Intervention(s) 1: Distraction;Food;Repositioned  Patient Stated Pain Goal: 0  Pain Scale 1: Numeric (0 - 10)       Therapeutic Activities:  Patient reports increased R shoulder pain due to arthritis this morning. Patient reports she would like to work on R UE and  to decrease stiffness during ROM needed for ADLS. Item retrieval within green T-Putty in order to increase hand dexterity and  strength needed for manipulation of buttons with UB ADLS. Patient demonstrated difficulty with fine motor skills today and dropped beads during retrieval. Assisted patient with w/c>bed transfer and bed mobility in order to assess safety and independence during routine. Patient able to complete with close SBA and cueing for safe hand placement during stand pivot transfer for optimal safety. Therapeutic Exercises:   Table slides utilizing skate for R UE, shoulder flexion/extension as well as horizontal abduction/adduction, B UE, 2 sets x 15 reps in order to increase UB ROM needed for UB ADLS. Patient felt more discomfort with horizontal abd/add today. Patient/Caregiver Education:    Екатерина Lyons Education on see above.       ASSESSMENT:  Patient continues to demonstrate the need for skilled Occupational Therapy services to improve dynamic standing balance needed for bathing  Progression toward goals:  [x]      Improving appropriately and progressing toward goals  []      Improving slowly and progressing toward goals  []      Not making progress toward goals and plan of care will be adjusted     Treatment session:   46 minutes    Therapist:    ANNE MARIE Israel,  9/26/2018

## 2018-09-26 NOTE — ROUTINE PROCESS
Bedside and verbal shift report given to BOLA Guzman LPN (oncoming nurse) by MARTHA Garcia LPN (off going nurse). Report included SBAR, MAR and Kardex.

## 2018-09-26 NOTE — PROGRESS NOTES
The patient was offered a social group activity of discussing current news and events on September 26, 2018. The patient declined this activity. The  will continue to offer group and 1:1 activities and encourage the patient to participate in the activities.

## 2018-09-26 NOTE — PROGRESS NOTES
The patient was offered a group activity of playing Eve in the dining room on September 26, 2018. The patient fully participated in the activity. While attending the group the patient was alert and oriented. The patient engaged in conversation with others. The  will continue to offer group and 1:1 activities to the patient and encourage continued participation in the activities.

## 2018-09-26 NOTE — PROGRESS NOTES
Problem: Mobility Impaired (Adult and Pediatric)  Goal: *Acute Goals and Plan of Care (Insert Text)  PHYSICAL THERAPY STG GOALS :  Initiated 9/25/2018 and to be accomplished within 2 Weeks    1. Patient will move from supine to sit and sit to supine  and roll side to side in bed with supervision/set-up. 2.  Patient will transfer from bed to chair and chair to bed with stand by assistance using RW. 3.  Patient will perform sit to stand with stand by assistance using RW with Fair+ balance and safety awareness. 4.  Patient will ambulate with stand by assistance for 150 feet with RW on level surfaces with 2 turns. 5.  Patient will ascend/descend 3 stairs with right handrail(s) with contact guard assist to allow for safe home access/exit. 6.  Patient will improve standardized test score for [place] Standing Balance Scale 3 to reduce fall risk. PHYSICAL THERAPY LTG GOALS :  Initiated 9/25/2018 and to be accomplished within 4 Weeks    1. Patient will move from supine to sit and sit to supine  and roll side to side in bed with modified independence. 2.  Patient will transfer from bed to chair and chair to bed with modified independence using LRAD. 3.  Patient will perform sit to stand with modified independence with Good balance and safety awareness. 4.  Patient will ambulate with supervision/set-up for 200 feet with LRAD on level surfaces and be able to maneuver through narrow spaces and obstacles without loss of balance. 5.  Patient will ascend/descend 3 stairs with right handrail(s) with stand by assistance to allow for safe home access/exit. 6.  Patient will improve standardized test score for [place] Standing Balance Scale 4 to reduce fall risk.       Physical Therapist:   Josafat Treviño, PT  on 9/25/2018            [address] [place] [address]   physical Therapy Daily Treatment note      Patient: Ilya Ghosh (7 y.o. female)               Date: 9/26/2018    Physician: Bria Cabezas MD  Primary Diagnosis: afib          Treatment Diagnosis  Treatment Diagnosis: increased need for assist with self care  Treatment Diagnosis 2: muscle weakness  Precautions: Fall  Vital Signs  Vital Signs  Temp: 97.7 °F (36.5 °C)  Temp Source: Oral  Pulse (Heart Rate): 71  Resp Rate: 18  O2 Sat (%): 96 %  BP: 133/59  Cognitive Status:  Mental Status  Neurologic State: Alert  Orientation Level: Oriented X4  Cognition: Appropriate for age attention/concentration  Pain  Pain Screen  Pain Scale 1: Numeric (0 - 10)  Pain Intensity 1: 9  Pain Location 1: Shoulder  Pain Orientation 1: Right  Pain Description 1: Aching  Pain Intervention(s) 1: Distraction;Food;Repositioned  Patient Stated Pain Goal: 0  Bed Mobility Training  Bed Mobility Training  Supine to Sit: Minimum assistance (for upper body )  Balance  Sitting: Without support  Sitting - Static: Good (unsupported)  Sitting - Dynamic: Fair (occasional) (((+)))  Standing: With support  Standing - Static: Fair  Standing - Dynamic : Fair  Transfer Training  Transfer Training  Sit to Stand: Contact guard assistance  Stand to Sit: Stand-by assistance  Sit to Stand: Contact guard assistance  Gait Training  Gait  Base of Support: Center of gravity altered  Speed/Mckenna: Pace decreased (<100 feet/min)  Step Length: Right shortened;Left shortened  Gait Abnormalities: Decreased step clearance  Ambulation - Level of Assistance: Contact guard assistance  Distance (ft): 80 Feet (ft)  Assistive Device: Gait belt;Walker, rolling  Gait Abnormalities: Decreased step clearance   With 2 turns. Therapeutic Exercise: Session One: Pt presented supine in bed. Pt reported 10/10 R shoulder pain and stated \"Aurther got me today\" Pt reported having intermittent arthritis pain for years. Pt required Min A for supine>sit due to R shoulder pain. Sit>stand from elevated EOB. Pt stated \"I can't get up from low chairs\", pt reported having a high bed at home and no trouble getting up.  Stand-step transfer EOB>w/c with RW and CGA. Sit<>stand from w/c x2 with CGA and verbal cues for hand placement. Pt remained sitting up in w/c at bedside for breakfast, call bell at side. Session Two: Pt presented supine in bed. Pt reported receiving pain medication for her R shoulder. Pt stated \"It makes me so tired and I had to lie down\". Pt agreeable to gait training. Min A for supine>sit for upper body due to R shoulder pain 5/10. Gait training with abovementioned details and w/c follow for safety. Pt with toe walking on R LE as pt reports leg length discrepancy. Therapist reviewed seated HEP TE's. Pt sitting in w/c at bedside, call bell at side. Patient/Caregiver Education:   Pt /Caregiver Education on safety and fall prevention, and HEP to include seated B LE TE's. Pt complete TE's with cues for technique,was provided to maximize functional gains. ASSESSMENT:  Patient continues to benefit from Skilled PT services to improve bed mobility, sit<>stand, transfers, strength, balance, gait and stair negotiation. Progression toward goals:  [x]      Improving appropriately and progressing toward goals  []      Improving slowly and progressing toward goals  []      Not making progress toward goals and plan of care will be adjusted     Treatment session: 45 minutes.   Therapist:   Mini Oliveros, MOISES,          9/26/2018

## 2018-09-26 NOTE — ROUTINE PROCESS
Bedside and verbal shift report given to [address] (oncoming nurse) by Zenobia Barry LPN (off going nurse). Report included SBAR, MAR and Kardex.

## 2018-09-27 PROCEDURE — 74011250637 HC RX REV CODE- 250/637: Performed by: INTERNAL MEDICINE

## 2018-09-27 PROCEDURE — 74011000250 HC RX REV CODE- 250: Performed by: INTERNAL MEDICINE

## 2018-09-27 RX ADMIN — APIXABAN 2.5 MG: 2.5 TABLET, FILM COATED ORAL at 19:45

## 2018-09-27 RX ADMIN — APIXABAN 2.5 MG: 2.5 TABLET, FILM COATED ORAL at 09:07

## 2018-09-27 RX ADMIN — METOPROLOL TARTRATE 25 MG: 25 TABLET, FILM COATED ORAL at 19:44

## 2018-09-27 RX ADMIN — TRAMADOL HYDROCHLORIDE 50 MG: 50 TABLET, FILM COATED ORAL at 09:07

## 2018-09-27 RX ADMIN — DILTIAZEM HYDROCHLORIDE 180 MG: 180 CAPSULE, EXTENDED RELEASE ORAL at 09:07

## 2018-09-27 RX ADMIN — POLYETHYLENE GLYCOL 3350 17 G: 17 POWDER, FOR SOLUTION ORAL at 09:07

## 2018-09-27 RX ADMIN — ATORVASTATIN CALCIUM 40 MG: 40 TABLET, FILM COATED ORAL at 21:13

## 2018-09-27 RX ADMIN — TRAMADOL HYDROCHLORIDE 50 MG: 50 TABLET, FILM COATED ORAL at 19:45

## 2018-09-27 RX ADMIN — PANTOPRAZOLE SODIUM 40 MG: 40 TABLET, DELAYED RELEASE ORAL at 09:07

## 2018-09-27 RX ADMIN — METOPROLOL TARTRATE 25 MG: 25 TABLET, FILM COATED ORAL at 09:07

## 2018-09-27 NOTE — ROUTINE PROCESS
Bedside and verbal shift report given to Froylan Holstein, LPN (oncoming nurse) by MARTHA Pendleton LPN (off going nurse). Report included SBAR, MAR and Kardex.

## 2018-09-27 NOTE — PROGRESS NOTES
Problem: Self Care Deficits Care Plan (Adult)  Goal: *Acute Goals and Plan of Care (Insert Text)  OCCUPATIONAL THERAPY SHORT TERM GOALS    Initiated 9/25/2018 and to be accomplished within 2 Week(s)    1. Patient will perform Upper body ADL's with adaptive equipment with supervision/set-up. 2.  Patient will perform Lower body ADL's with adaptive equipment as needed with moderate assistance  . 3. Patient will perform toileting task with standby assist with Good safety to reduce falls risk. 4.  Patient will perform functional transfers with Rolling Walker and supervision/set-up. 5.  Patient will perform grooming task in standing for 5 minutes using RW with SBA and Good safety awareness. 6.  Patient will improve Barthel index scores to atleast 65/100 to improve functional mobility. 7.  Patient will utilize energy conservation techniques during functional activities with minimal verbal cues. OCCUPATIONAL THERAPY LONG TERM GOALS   Initiated 9/25/2018 and to be accomplished within 4 Week(s)    1. Patient will perform ADL's & IADLs with adaptive equipment as needed with modified independence. 2.  Patient will perform toileting task with modified independence with Good safety to reduce falls risk. 3.  Patient will perform all functional transfers utilizing least restrictive device and durable medical equipment as needed with modified independence and Good balance and safety awareness. 4.  Patient will perform standing static/dynamic activity for improved ADL/IADL function with modified independence and Good balance and safety awareness. 5.  Patient will improve Barthel index score to 95/100 to improve independence with mobility. 6. Patient will perform home making tasks and simple meal prep Mod I utilizing energy conservation techniques and good safety awareness.       Therapist: Cass Beavers    9/25/2018          [facility]   Occupational Therapy Daily Treatment note      Patient: Renee Darby (89 y.o. female)       Date: 9/27/2018  Attending Physician: Lincoln Villar MD  Primary Diagnosis: afib    Treatment Diagnosis  Treatment Diagnosis: increased need for assist with self care  Treatment Diagnosis 2: muscle weakness   Precautions : Precautions at Admission: Fall  Vital Signs:  Vital Signs  Temp: 97.2 °F (36.2 °C)  Temp Source: Oral  Pulse (Heart Rate): 70  Resp Rate: 20  O2 Sat (%): 100 %  BP: 177/56  MAP (Calculated): 96  BP 1 Method: Automatic  BP 1 Location: Right arm  BP Patient Position: At rest     Cognitive Status:  Mental Status  Neurologic State: Alert  Orientation Level: Oriented X4  Cognition: Impulsive;Poor safety awareness  Pain:  Pain Screen  Pain Scale 1: Numeric (0 - 10)  Pain Intensity 1: 5  Pain Location 1: Shoulder  Pain Orientation 1: Right  Pain Description 1: Aching  Pain Intervention(s) 1: Medication (see MAR); Distraction;Repositioned; Food  Pain Scale 1: Numeric (0 - 10)  Gross Assessment:     Coordination:     Bed Mobility:     Transfers:  Functional Transfers  Sit to Stand: Contact guard assistance  Stand to Sit: Stand-by assistance     Balance:  Balance  Sitting: Without support  Sitting - Static: Good (unsupported)  Sitting - Dynamic: Fair (occasional) (((+)))  Standing: With support  Standing - Static: Fair  Standing - Dynamic : Fair  Therapeutic Activities:   Static standing activity with one hand release in order to increase standing balance and tolerance needed for ADLS. KENNEY noticed patient to have a top touch stance on R LE when standing and walking today. Patient reports it is from previous injury and she was able to manage with at home. Patient was able to tolerate two trials of standing, 5:30 and 4:30 with SBA prior to requesting to sit. Therapeutic Exercises:  Green digiflex, 2 sets x 20 reps in order to increase  strengthening and hand dexterity needed for UB ADLS.   Patient/Caregiver Education:    Екатерина Collazo Education on reaching back for w/c during stand to sit transfer was provided for optimal safety.       ASSESSMENT:  Patient continues to demonstrate the need for skilled Occupational Therapy services to improve dynamic standing balance needed for bathing  Progression toward goals:  [x]      Improving appropriately and progressing toward goals  []      Improving slowly and progressing toward goals  []      Not making progress toward goals and plan of care will be adjusted     Treatment session:   48 minutes    Therapist:    ANNE MARIE Duff,  9/27/2018

## 2018-09-27 NOTE — PROGRESS NOTES
Problem: Mobility Impaired (Adult and Pediatric)  Goal: *Acute Goals and Plan of Care (Insert Text)  PHYSICAL THERAPY STG GOALS :  Initiated 9/25/2018 and to be accomplished within 2 Weeks    1. Patient will move from supine to sit and sit to supine  and roll side to side in bed with supervision/set-up. 2.  Patient will transfer from bed to chair and chair to bed with stand by assistance using RW. 3.  Patient will perform sit to stand with stand by assistance using RW with Fair+ balance and safety awareness. 4.  Patient will ambulate with stand by assistance for 150 feet with RW on level surfaces with 2 turns. 5.  Patient will ascend/descend 3 stairs with right handrail(s) with contact guard assist to allow for safe home access/exit. 6.  Patient will improve standardized test score for [place] Standing Balance Scale 3 to reduce fall risk. PHYSICAL THERAPY LTG GOALS :  Initiated 9/25/2018 and to be accomplished within 4 Weeks    1. Patient will move from supine to sit and sit to supine  and roll side to side in bed with modified independence. 2.  Patient will transfer from bed to chair and chair to bed with modified independence using LRAD. 3.  Patient will perform sit to stand with modified independence with Good balance and safety awareness. 4.  Patient will ambulate with supervision/set-up for 200 feet with LRAD on level surfaces and be able to maneuver through narrow spaces and obstacles without loss of balance. 5.  Patient will ascend/descend 3 stairs with right handrail(s) with stand by assistance to allow for safe home access/exit. 6.  Patient will improve standardized test score for [place] Standing Balance Scale 4 to reduce fall risk.       Physical Therapist:   Abiel Blum, PT  on 9/25/2018            [address] [place] [address]   physical Therapy Daily Treatment note      Patient: Caio Davis (89 y.o. female)               Date: 9/27/2018    Physician: Ángel Rios MD  Primary Diagnosis: afib          Treatment Diagnosis  Treatment Diagnosis: increased need for assist with self care  Treatment Diagnosis 2: muscle weakness  Precautions: Fall  Vital Signs  Vital Signs  Temp: 97.2 °F (36.2 °C)  Temp Source: Oral  Pulse (Heart Rate): 70  Resp Rate: 20  O2 Sat (%): 100 %  BP: 177/56  MAP (Calculated): 96  BP 1 Method: Automatic  BP 1 Location: Right arm  BP Patient Position: At rest  Cognitive Status:  Mental Status  Neurologic State: Alert  Orientation Level: Oriented X4  Pain  Bed Mobility Training  Balance  Sitting: Without support  Sitting - Static: Good (unsupported)  Sitting - Dynamic: Fair (occasional) (((+)))  Standing: With support  Standing - Static: Fair  Standing - Dynamic : Fair  Transfer Training  Transfer Training  Sit to Stand: Contact guard assistance  Stand to Sit: Stand-by assistance  Sit to Stand: Contact guard assistance  Gait Training  Gait  Base of Support: Center of gravity altered  Speed/Mckenna: Pace decreased (<100 feet/min)  Step Length: Right shortened;Left shortened  Gait Abnormalities: Decreased step clearance; Toe walking (Toe walking on R side due to leg lenght discrepency)  Ambulation - Level of Assistance: Contact guard assistance  Distance (ft): 82 Feet (ft)  Assistive Device: Gait belt;Walker, rolling  Rail Use: Both  Stairs - Level of Assistance: Contact guard assistance  Number of Stairs Trained: 5  Interventions: Safety awareness training;Verbal cues  Gait Abnormalities: Decreased step clearance; Toe walking (Toe walking on R side due to leg lenght discrepency)   With 3 turns. Therapeutic Exercise: sit>stand throughout session with CGA and cues for hand placement, stand-sit with SBA. Gait training rendered with abovementioned details with decreased pace. Pt requested to sit due to fatigue. Pt stated \"At home I just sit down whenever I feel tired\". Stair training rendered as noted above with cues for sequencing.  Pt ascends with (stornger L LE ) and also descends with L LE as pt reported this is more comfortable due to leg length discrepancy. Therapist had planned on continued stair training with 1 HR, however pt reported being tired and fatigued follow stair training. Seated B LE TE's rendered to promote strength and endurance for improved functional mobility: LAQ, hip flexion 2x15 (mininal range on R side), ball squeezes, resisted hip abd (yellow band), HR/TR x20. Pt required intermittent verbal/visual cues for proper technique with TE's. Therapist reviewed HEP with pt. Pt reported doing LAQ and hip flexion throughout the day when sitting at bedside. Patient/Caregiver Education:   Pt /Caregiver Education on safety and fall prevention, and energy conservation. Pt reported feeling very tired and weak follow stair training and required prolonged seated rest break. ASSESSMENT:  Patient continues to benefit from Skilled PT services to improve sit<>stand, strength, balance, gait and stair negotiation. Progression toward goals:  [x]      Improving appropriately and progressing toward goals  []      Improving slowly and progressing toward goals  []      Not making progress toward goals and plan of care will be adjusted     Treatment session: 48 minutes.   Therapist:   Azar Nagel PTA,          9/27/2018

## 2018-09-28 PROCEDURE — 77030019934 HC DRSG VAC ASST KCON -B

## 2018-09-28 PROCEDURE — 77030019952 HC CANSTR VAC ASST KCON -B

## 2018-09-28 PROCEDURE — 74011250637 HC RX REV CODE- 250/637: Performed by: INTERNAL MEDICINE

## 2018-09-28 PROCEDURE — 74011000250 HC RX REV CODE- 250: Performed by: INTERNAL MEDICINE

## 2018-09-28 RX ORDER — TRIAMCINOLONE ACETONIDE 40 MG/ML
40 INJECTION, SUSPENSION INTRA-ARTICULAR; INTRAMUSCULAR
Status: DISCONTINUED | OUTPATIENT
Start: 2018-09-28 | End: 2018-10-11 | Stop reason: HOSPADM

## 2018-09-28 RX ORDER — LIDOCAINE HYDROCHLORIDE 10 MG/ML
5 INJECTION INFILTRATION; PERINEURAL
Status: DISCONTINUED | OUTPATIENT
Start: 2018-09-28 | End: 2018-10-11 | Stop reason: HOSPADM

## 2018-09-28 RX ADMIN — METOPROLOL TARTRATE 25 MG: 25 TABLET, FILM COATED ORAL at 08:52

## 2018-09-28 RX ADMIN — DILTIAZEM HYDROCHLORIDE 180 MG: 180 CAPSULE, EXTENDED RELEASE ORAL at 08:52

## 2018-09-28 RX ADMIN — TRAMADOL HYDROCHLORIDE 50 MG: 50 TABLET, FILM COATED ORAL at 08:52

## 2018-09-28 RX ADMIN — APIXABAN 2.5 MG: 2.5 TABLET, FILM COATED ORAL at 17:58

## 2018-09-28 RX ADMIN — PANTOPRAZOLE SODIUM 40 MG: 40 TABLET, DELAYED RELEASE ORAL at 08:52

## 2018-09-28 RX ADMIN — POLYETHYLENE GLYCOL 3350 17 G: 17 POWDER, FOR SOLUTION ORAL at 08:52

## 2018-09-28 RX ADMIN — METOPROLOL TARTRATE 25 MG: 25 TABLET, FILM COATED ORAL at 17:58

## 2018-09-28 RX ADMIN — ATORVASTATIN CALCIUM 40 MG: 40 TABLET, FILM COATED ORAL at 22:37

## 2018-09-28 RX ADMIN — APIXABAN 2.5 MG: 2.5 TABLET, FILM COATED ORAL at 08:52

## 2018-09-28 NOTE — ROUTINE PROCESS
Bedside and Verbal shift change report given to Raimundo Brooks (oncoming nurse) by Celia Schuster LPN  (offgoing nurse). Report included the following information SBAR, Kardex, MAR and Recent Results.

## 2018-09-28 NOTE — H&P
[facility] HISTORY AND PHYSICAL Jt Dunne 
MR#: [id number] : 1923 ACCOUNT #: [de-identified] ADMIT DATE: 2018 CHIEF COMPLAINT:  Weakness. HISTORY OF PRESENT ILLNESS:  A 80year-old, pleasant female admitted to the acute side with acute onset atrial fibrillation. Patient stabilized and medications adjusted, but after hospitalization, she was found to be deconditioned, and she was felt to need further skilled monitoring and rehabilitation. Patient was admitted to [facility], was seen in her room after eating breakfast, complaining of right shoulder pain. She denies chest pain, palpitation or shortness of breath. PAST MEDICAL HISTORY: 
1. Atrial fibrillation, as above. 2.  Coronary artery disease, status post myocardial infarction in . 
3.  Gastroesophageal reflux disease. 4.  Osteopenia. 5.  Chronic anemia. 6.  Spinal spondylosis with stenosis and epidural injections in the past. 
7.  Osteoarthritis. 8.  Hypertension. 9.  Hypercholesterolemia. 10.  Status post coronary angioplasty, as above. PAST SURGICAL HISTORY:  Appendectomy and hysterectomy. ALLERGIES:  PENICILLIN, SULFA, AND MACRODANTIN. MEDICATIONS: 
1. Tylenol. 2.  Eliquis. 3.  Lipitor. 4.  Cardizem. 5.  Lopressor. 6.  MiraLax. 7.  Protonix. 8.  Tramadol. 9.  Vitamin D2. SOCIAL HISTORY:  She is , has 3 sons. She is a nonsmoker, nondrinker, and she is currently living alone. FAMILY HISTORY:  Father had cancer. Mother had diabetes and also some type of cancer. REVIEW OF SYSTEMS:  No fever, no chills. No chest pain, palpitation, shortness of breath. No abdominal pain, nausea, vomiting. No change in bowel or bladder habits. No neurologic symptoms. Only right shoulder pain, as above. PHYSICAL EXAMINATION: 
GENERAL:  Pleasant female up in a chair in no acute distress. VITAL SIGNS:  Temperature 97.2, pulse 70, blood pressure 177/56. HEENT:  Atraumatic, normocephalic. NECK:  No adenopathy or thyromegaly. LUNGS:  Equal air entry, clear to auscultation. HEART:  Regular rhythm without audible rubs or gallops appreciated. ABDOMEN:  Soft, nontender, nondistended. EXTREMITIES:  Osteoarthritis. No clubbing, cyanosis or edema. Right shoulder pain reproduced with abduction above shoulder level. NEUROLOGIC:  Nonfocal. 
 
ASSESSMENT: 
1. Deconditioning. 2.  Right shoulder bursitis. 3.  Paroxysmal atrial fibrillation. 4.  Coronary artery disease. 5.  Hypertension. 6.  As per past medical history. PLAN:  We will try to arrange for the patient to get a steroid injection in her shoulder, resume usual meds, PT, OT evaluate and treat. Clinical monitoring. Further management accordingly. MD NKECHI Klein/JOVON 
D: 09/28/2018 07:50    
T: 09/28/2018 08:10 
JOB #: [id number]

## 2018-09-28 NOTE — PROGRESS NOTES
Problem: Mobility Impaired (Adult and Pediatric)  Goal: *Acute Goals and Plan of Care (Insert Text)  PHYSICAL THERAPY STG GOALS :  Initiated 9/25/2018 and to be accomplished within 2 Weeks    1. Patient will move from supine to sit and sit to supine  and roll side to side in bed with supervision/set-up. 2.  Patient will transfer from bed to chair and chair to bed with stand by assistance using RW. 3.  Patient will perform sit to stand with stand by assistance using RW with Fair+ balance and safety awareness. 4.  Patient will ambulate with stand by assistance for 150 feet with RW on level surfaces with 2 turns. 5.  Patient will ascend/descend 3 stairs with right handrail(s) with contact guard assist to allow for safe home access/exit. 6.  Patient will improve standardized test score for [place] Standing Balance Scale 3 to reduce fall risk. PHYSICAL THERAPY LTG GOALS :  Initiated 9/25/2018 and to be accomplished within 4 Weeks    1. Patient will move from supine to sit and sit to supine  and roll side to side in bed with modified independence. 2.  Patient will transfer from bed to chair and chair to bed with modified independence using LRAD. 3.  Patient will perform sit to stand with modified independence with Good balance and safety awareness. 4.  Patient will ambulate with supervision/set-up for 200 feet with LRAD on level surfaces and be able to maneuver through narrow spaces and obstacles without loss of balance. 5.  Patient will ascend/descend 3 stairs with right handrail(s) with stand by assistance to allow for safe home access/exit. 6.  Patient will improve standardized test score for [place] Standing Balance Scale 4 to reduce fall risk.       Physical Therapist:   Paris Moore, PT  on 9/25/2018            [address] [place] [address]   physical Therapy Daily Treatment note      Patient: Magali Raya (58 y.o. female)               Date: 9/28/2018    Physician: Rajani Griffith MD  Primary Diagnosis: afib          Treatment Diagnosis  Treatment Diagnosis: increased need for assist with self care  Treatment Diagnosis 2: muscle weakness  Precautions: Fall  Vital Signs  Vital Signs  Temp: 97.7 °F (36.5 °C)  Temp Source: Oral  Pulse (Heart Rate): 64  Resp Rate: 18  O2 Sat (%): 93 %  BP: 138/55  MAP (Calculated): 83  BP 1 Method: Automatic  BP 1 Location: Right arm  BP Patient Position: At rest     Cognitive Status:  Mental Status  Neurologic State: Alert  Orientation Level: Oriented X4  Cognition: Follows commands  Pain  Pain Screen  Pain Scale 1: Numeric (0 - 10)  Pain Intensity 1: 5  Pain Location 1: Shoulder  Pain Orientation 1: Right  Pain Description 1: Aching  Pain Intervention(s) 1: Medication (see MAR); Distraction;Repositioned;Relaxation technique  Bed Mobility Training     Balance  Sitting: Intact  Sitting - Static: Good (unsupported)  Sitting - Dynamic: Good (unsupported)  Standing: With support  Standing - Static: Good  Standing - Dynamic : Fair  Transfer Training  Transfer Training  Sit to Stand: Stand-by assistance  Stand to Sit: Stand-by assistance  Sit to Stand: Stand-by assistance  Gait Training  Gait  Base of Support: Center of gravity altered  Speed/Mckenna: Slow  Step Length: Left shortened  Gait Abnormalities: Antalgic  Ambulation - Level of Assistance: Stand-by assistance  Distance (ft): 154 Feet (ft) (45, 28)  Assistive Device: Walker, rolling (RW x 154 ft, large based quad cane x 28 ft, rollator x 45 ft)     Gait Abnormalities: Antalgic            With 4 turns. Therapeutic Exercise:    Gait training as mentioned above, toe walks on R, audible clicking of R hip, pt denies pain. Gait training using quad cane x 28 ft with SBA; however, pt does not demonstrate efficient stability during ambulation with quad cane.  Assessed gait pattern using rollator x 45 ft with SBA and verbal cueing for improved proximity, pt reported favoring the rollator over other devices, will continue to train with rollator. Static standing balance with facilitation of reaching across midline and outside MURPHY, no LOB noted with intermittent UE support. Patient/Caregiver Education:   Pt /Caregiver Education on safety and fall prevention,  Pt demonstrates proper use of call bell. ASSESSMENT:  Patient continues to benefit from Skilled PT services to improve ambulation, balance, mobility.    Progression toward goals:  []      Improving appropriately and progressing toward goals  [x]      Improving slowly and progressing toward goals  []      Not making progress toward goals and plan of care will be adjusted     Treatment session: 44 minutes  Therapist:   Lena Cordova PT,          9/28/2018

## 2018-09-28 NOTE — ROUTINE PROCESS
Called Dr. Mauri Arguello made him aware we have supplies for bedside joint injection in med room in her bin. He stated he will be in tomorrow.

## 2018-09-28 NOTE — PROGRESS NOTES
Problem: Self Care Deficits Care Plan (Adult)  Goal: *Acute Goals and Plan of Care (Insert Text)  OCCUPATIONAL THERAPY SHORT TERM GOALS    Initiated 9/25/2018 and to be accomplished within 2 Week(s)    1. Patient will perform Upper body ADL's with adaptive equipment with supervision/set-up. 2.  Patient will perform Lower body ADL's with adaptive equipment as needed with moderate assistance  . 3. Patient will perform toileting task with standby assist with Good safety to reduce falls risk. 4.  Patient will perform functional transfers with Rolling Walker and supervision/set-up. 5.  Patient will perform grooming task in standing for 5 minutes using RW with SBA and Good safety awareness. 6.  Patient will improve Barthel index scores to atleast 65/100 to improve functional mobility. 7.  Patient will utilize energy conservation techniques during functional activities with minimal verbal cues. OCCUPATIONAL THERAPY LONG TERM GOALS   Initiated 9/25/2018 and to be accomplished within 4 Week(s)    1. Patient will perform ADL's & IADLs with adaptive equipment as needed with modified independence. 2.  Patient will perform toileting task with modified independence with Good safety to reduce falls risk. 3.  Patient will perform all functional transfers utilizing least restrictive device and durable medical equipment as needed with modified independence and Good balance and safety awareness. 4.  Patient will perform standing static/dynamic activity for improved ADL/IADL function with modified independence and Good balance and safety awareness. 5.  Patient will improve Barthel index score to 95/100 to improve independence with mobility. 6. Patient will perform home making tasks and simple meal prep Mod I utilizing energy conservation techniques and good safety awareness.       Therapist: Agnes Fisher    9/25/2018          [facility]   Occupational Therapy Daily Treatment note      Patient: Toño Da Silva (89 y.o. female)       Date: 9/28/2018  Attending Physician: Brenton Rodriguez MD  Primary Diagnosis: afib    Treatment Diagnosis  Treatment Diagnosis: increased need for assist with self care  Treatment Diagnosis 2: muscle weakness   Precautions : Precautions at Admission: Fall  Vital Signs:  Vital Signs  Temp: 97.7 °F (36.5 °C)  Temp Source: Oral  Pulse (Heart Rate): 64  Resp Rate: 18  O2 Sat (%): 93 %  BP: 138/55  MAP (Calculated): 83  BP 1 Method: Automatic  BP 1 Location: Right arm  BP Patient Position: At rest     Cognitive Status:  Mental Status  Neurologic State: Alert  Orientation Level: Oriented X4  Cognition: Follows commands  Pain:  Pain Screen  Pain Scale 1: Numeric (0 - 10)  Pain Intensity 1: 5  Pain Location 1: Shoulder  Pain Orientation 1: Right  Pain Description 1: Aching  Pain Intervention(s) 1: Medication (see MAR); Distraction;Repositioned;Relaxation technique  Pain Scale 1: Numeric (0 - 10)  Gross Assessment:     Coordination:     Bed Mobility:     Transfers:  Functional Transfers  Sit to Stand: Stand-by assistance  Stand to Sit: Stand-by assistance     Balance:  Balance  Sitting: Intact  Sitting - Static: Good (unsupported)  Sitting - Dynamic: Good (unsupported)  Standing: With support  Standing - Static: Good  Standing - Dynamic : Fair       Therapeutic Activities:  Partially co-treated with PT for optimal functional gains with dynamic standing balance needed to safely complete home management tasks. Practiced functional mobility utilizing hurricane and rollator in order to determine appropriate AD needed for optimal safety and independence during mobility. Patient demonstrated increased support with rollator and will continue to practice for optimal independence with ADL/IADL tasks and transfers.    Therapeutic Exercises:  UB strengthening with 2#, 2 sets x 20 reps in order to increase functional activity tolerance and UB muscle strength needed for ADLs  Patient/Caregiver Education:    Pt. Eleazar Child Education on slow pacing during turns with rollator was provided for optimal safety.       ASSESSMENT:  Patient continues to demonstrate the need for skilled Occupational Therapy services to improve dynamic standing balance needed for bathing  Progression toward goals:  [x]      Improving appropriately and progressing toward goals  []      Improving slowly and progressing toward goals  []      Not making progress toward goals and plan of care will be adjusted     Treatment session:  43 minutes    Therapist:    ANNE MARIE Cantor,  9/28/2018

## 2018-09-28 NOTE — PROGRESS NOTES
The patient was offered a social group activity of playing Catch Phrase on September 27, 2018. The patient declined this activity. The  will continue to offer group and 1:1 activities and encourage the patient to participate in the activities.

## 2018-09-29 PROCEDURE — 74011250637 HC RX REV CODE- 250/637: Performed by: INTERNAL MEDICINE

## 2018-09-29 RX ADMIN — METOPROLOL TARTRATE 25 MG: 25 TABLET, FILM COATED ORAL at 17:40

## 2018-09-29 RX ADMIN — ATORVASTATIN CALCIUM 40 MG: 40 TABLET, FILM COATED ORAL at 21:24

## 2018-09-29 RX ADMIN — DILTIAZEM HYDROCHLORIDE 180 MG: 180 CAPSULE, EXTENDED RELEASE ORAL at 09:45

## 2018-09-29 RX ADMIN — APIXABAN 2.5 MG: 2.5 TABLET, FILM COATED ORAL at 17:41

## 2018-09-29 RX ADMIN — APIXABAN 2.5 MG: 2.5 TABLET, FILM COATED ORAL at 09:45

## 2018-09-29 RX ADMIN — METOPROLOL TARTRATE 25 MG: 25 TABLET, FILM COATED ORAL at 09:45

## 2018-09-29 RX ADMIN — PANTOPRAZOLE SODIUM 40 MG: 40 TABLET, DELAYED RELEASE ORAL at 09:45

## 2018-09-29 NOTE — PROGRESS NOTES
Problem: Self Care Deficits Care Plan (Adult)  Goal: *Acute Goals and Plan of Care (Insert Text)  OCCUPATIONAL THERAPY SHORT TERM GOALS    Initiated 9/25/2018 and to be accomplished within 2 Week(s)    1. Patient will perform Upper body ADL's with adaptive equipment with supervision/set-up. 2.  Patient will perform Lower body ADL's with adaptive equipment as needed with moderate assistance  . 3. Patient will perform toileting task with standby assist with Good safety to reduce falls risk. 4.  Patient will perform functional transfers with Rolling Walker and supervision/set-up. 5.  Patient will perform grooming task in standing for 5 minutes using RW with SBA and Good safety awareness. 6.  Patient will improve Barthel index scores to atleast 65/100 to improve functional mobility. 7.  Patient will utilize energy conservation techniques during functional activities with minimal verbal cues. OCCUPATIONAL THERAPY LONG TERM GOALS   Initiated 9/25/2018 and to be accomplished within 4 Week(s)    1. Patient will perform ADL's & IADLs with adaptive equipment as needed with modified independence. 2.  Patient will perform toileting task with modified independence with Good safety to reduce falls risk. 3.  Patient will perform all functional transfers utilizing least restrictive device and durable medical equipment as needed with modified independence and Good balance and safety awareness. 4.  Patient will perform standing static/dynamic activity for improved ADL/IADL function with modified independence and Good balance and safety awareness. 5.  Patient will improve Barthel index score to 95/100 to improve independence with mobility. 6. Patient will perform home making tasks and simple meal prep Mod I utilizing energy conservation techniques and good safety awareness.       Therapist: Cecy Barone    9/25/2018          [facility]   Occupational Therapy Daily Treatment note      Patient: Sonny Carnes (89 y.o. female)       Date: 9/29/2018  Attending Physician: Merle Vazquez MD  Primary Diagnosis: afib    Treatment Diagnosis  Treatment Diagnosis: increased need for assist with self care  Treatment Diagnosis 2: muscle weakness   Precautions : Precautions at Admission: Fall  Vital Signs:  Vital Signs  Pulse (Heart Rate): 64  BP: 116/71     Cognitive Status:  Mental Status  Neurologic State: Alert  Orientation Level: Oriented X4  Cognition: Appropriate safety awareness; Appropriate for age attention/concentration  Pain:  Pain Screen  Pain Scale 1: Numeric (0 - 10)  Pain Intensity 1: 0  Patient Stated Pain Goal: 0  Pain Scale 1: Numeric (0 - 10)  Bed Mobility:  Bed Mobility  Supine to Sit: Stand-by assistance  Scooting: Stand-by assistance  Transfers:  Functional Transfers  Sit to Stand: Stand-by assistance  Stand to Sit: Stand-by assistance     Balance:  Balance  Sitting: Intact  Sitting - Static: Good (unsupported)  Sitting - Dynamic: Good (unsupported)  Standing: With support  Standing - Static: Good  Standing - Dynamic : Fair  Therapeutic Activities:  SBA fnxl room and hallway mobility with RW ~ 150 ft x 2 to increase activity tolerance and endurance. Pt required min cueing for proper safety with RW mgmt and with directional changes. FM task with green theraputty and small item retrieval to increase strength and finger dexterity to improve fnxl performance skills with self feeding and UB dressing tasks. Therapeutic Exercises:   BUE ex with 2# dowel bar 3 x 15 shoulder flexion/extension and bicep curls to increase strength and ROM for ADL carryover. Patient/Caregiver Education:    Pt educated on button hook for increased (I) with UB dressing tasks. ASSESSMENT:  Patient continues to demonstrate the need for skilled Occupational Therapy services to improve strength, balance, and endurance.    Progression toward goals:  [x]      Improving appropriately and progressing toward goals  [] Improving slowly and progressing toward goals  []      Not making progress toward goals and plan of care will be adjusted     Treatment session:   46 minutes    Therapist:    ANNE MARIE Schwartz,  9/29/2018

## 2018-09-29 NOTE — ROUTINE PROCESS
Bedside and Verbal shift change report given to MAYDA Farfan LPN (oncoming nurse) by Negrito New RN (offgoing nurse). Report included the following information SBAR, Kardex and MAR. Qh visual checks and 24h chart checks done

## 2018-09-29 NOTE — ROUTINE PROCESS
Bedside and Verbal shift change report given to Stefano Garay RN (oncoming nurse) by Zachary Briceño LPN (offgoing nurse). Report included the following information SBAR, Kardex and MAR.

## 2018-09-29 NOTE — ROUTINE PROCESS
Bedside and Verbal shift change report given to Bard Hernandez RN (oncoming nurse) by Kervin Marquis LPN (offgoing nurse). Report included the following information SBAR, Kardex and MAR.

## 2018-09-29 NOTE — PROGRESS NOTES
conducted a Follow up consultation and Spiritual Assessment for [facility] ERNESTINE, who is a 80 y. o.,female. The  provided the following Interventions:  Continued the relationship of care and support. Listened empathically. Offered prayer and assurance of continued prayer on patients behalf. Chart reviewed. The following outcomes were achieved:  Patient expressed gratitude for 's visit. Assessment:  There are no spiritual or Baptism issues which require intervention at this time. Plan:  Chaplains will continue to follow and will provide pastoral care on an as needed/requested basis.  recommends bedside caregivers page  on duty if patient shows signs of acute spiritual or emotional distress. Evan Quesada M.Div.   , [address] [facility]: [phone]/[facility]: [phone]

## 2018-09-30 PROCEDURE — 74011250637 HC RX REV CODE- 250/637: Performed by: INTERNAL MEDICINE

## 2018-09-30 PROCEDURE — 74011000250 HC RX REV CODE- 250: Performed by: INTERNAL MEDICINE

## 2018-09-30 RX ADMIN — APIXABAN 2.5 MG: 2.5 TABLET, FILM COATED ORAL at 17:44

## 2018-09-30 RX ADMIN — DILTIAZEM HYDROCHLORIDE 180 MG: 180 CAPSULE, EXTENDED RELEASE ORAL at 10:54

## 2018-09-30 RX ADMIN — POLYETHYLENE GLYCOL 3350 17 G: 17 POWDER, FOR SOLUTION ORAL at 10:55

## 2018-09-30 RX ADMIN — METOPROLOL TARTRATE 25 MG: 25 TABLET, FILM COATED ORAL at 10:55

## 2018-09-30 RX ADMIN — APIXABAN 2.5 MG: 2.5 TABLET, FILM COATED ORAL at 10:55

## 2018-09-30 RX ADMIN — METOPROLOL TARTRATE 25 MG: 25 TABLET, FILM COATED ORAL at 17:44

## 2018-09-30 RX ADMIN — ATORVASTATIN CALCIUM 40 MG: 40 TABLET, FILM COATED ORAL at 21:16

## 2018-09-30 RX ADMIN — PANTOPRAZOLE SODIUM 40 MG: 40 TABLET, DELAYED RELEASE ORAL at 10:18

## 2018-09-30 NOTE — ROUTINE PROCESS
Bedside and Verbal shift change report given to Juan Fine RN (oncoming nurse) by Lamar Sandoval LPN (offgoing nurse). Report included the following information SBAR, Kardex and MAR.

## 2018-09-30 NOTE — ROUTINE PROCESS
Bedside and Verbal shift change report given to Josefina Salcido RN (oncoming nurse) by Lenward Holstein, RN (offgoing nurse). Report included the following information SBAR, Kardex and MAR.

## 2018-09-30 NOTE — ROUTINE PROCESS
Bedside and Verbal shift change report given to Asuncion Jacques (oncoming nurse) by Ronnell Hurtado RN (offgoing nurse). Report included the following information SBAR, Kardex and MAR. QH visual checks and 24H chart checks done.

## 2018-10-01 PROCEDURE — 74011000250 HC RX REV CODE- 250: Performed by: INTERNAL MEDICINE

## 2018-10-01 PROCEDURE — 74011250637 HC RX REV CODE- 250/637: Performed by: INTERNAL MEDICINE

## 2018-10-01 RX ADMIN — ATORVASTATIN CALCIUM 40 MG: 40 TABLET, FILM COATED ORAL at 21:35

## 2018-10-01 RX ADMIN — ERGOCALCIFEROL 50000 UNITS: 1.25 CAPSULE ORAL at 17:28

## 2018-10-01 RX ADMIN — DILTIAZEM HYDROCHLORIDE 180 MG: 180 CAPSULE, EXTENDED RELEASE ORAL at 08:47

## 2018-10-01 RX ADMIN — APIXABAN 2.5 MG: 2.5 TABLET, FILM COATED ORAL at 08:47

## 2018-10-01 RX ADMIN — APIXABAN 2.5 MG: 2.5 TABLET, FILM COATED ORAL at 17:28

## 2018-10-01 RX ADMIN — METOPROLOL TARTRATE 25 MG: 25 TABLET, FILM COATED ORAL at 17:28

## 2018-10-01 RX ADMIN — POLYETHYLENE GLYCOL 3350 17 G: 17 POWDER, FOR SOLUTION ORAL at 08:47

## 2018-10-01 RX ADMIN — PANTOPRAZOLE SODIUM 40 MG: 40 TABLET, DELAYED RELEASE ORAL at 08:47

## 2018-10-01 RX ADMIN — ACETAMINOPHEN 650 MG: 325 TABLET, FILM COATED ORAL at 08:47

## 2018-10-01 RX ADMIN — METOPROLOL TARTRATE 25 MG: 25 TABLET, FILM COATED ORAL at 08:47

## 2018-10-01 NOTE — PROGRESS NOTES
Problem: Mobility Impaired (Adult and Pediatric)  Goal: *Acute Goals and Plan of Care (Insert Text)  PHYSICAL THERAPY STG GOALS :  Initiated 9/25/2018 and to be accomplished within 2 Weeks (updated 10/01/18)    1. Patient will move from supine to sit and sit to supine  and roll side to side in bed with supervision/set-up. (Achieved)     2. Patient will transfer from bed to chair and chair to bed with stand by assistance using RW. (Achieved)  3. Patient will perform sit to stand with stand by assistance using RW with Fair+ balance and safety awareness. (Achieved)  4. Patient will ambulate with stand by assistance for 150 feet with RW on level surfaces with 2 turns. (Achieved)  5. Patient will ascend/descend 3 stairs with right handrail(s) with contact guard assist to allow for safe home access/exit. (Achieved)  6. Patient will improve standardized test score for [place] Standing Balance Scale 3 to reduce fall risk. (Progressing; 2+)    PHYSICAL THERAPY LTG GOALS :  Initiated 9/25/2018 and to be accomplished within 4 Weeks    1. Patient will move from supine to sit and sit to supine  and roll side to side in bed with modified independence. 2.  Patient will transfer from bed to chair and chair to bed with modified independence using LRAD. 3.  Patient will perform sit to stand with modified independence with Good balance and safety awareness. 4.  Patient will ambulate with supervision/set-up for 200 feet with LRAD on level surfaces and be able to maneuver through narrow spaces and obstacles without loss of balance. 5.  Patient will ascend/descend 3 stairs with right handrail(s) with stand by assistance to allow for safe home access/exit. 6.  Patient will improve standardized test score for [place] Standing Balance Scale 4 to reduce fall risk.       Physical Therapist:   Estrada Castillo, PT  on 9/25/2018            [facility]   PHYSICAL THERAPY WEEKLY PROGRESS REPORT  Reporting Period: Date:   9/25/18*  to 10/01/18      Patient: Jeffrey Davila (89 y.o. female)                         Date: 10/1/2018    Primary Diagnosis: afib      Attending Physician: Ree Taylor MD   Treatment Diagnosis  Treatment Diagnosis: difficulty in walking  Treatment Diagnosis 2: muscle weakness  Precautions:  Fall  Rehab Potential : Excellent:    Skill interventions and education provided with clinical rationale (include individualized treatment techniques and standardized tests):   Skilled Physical Therapy services were provided with:   TE rendered to address strength, endurance, mobility. TA rendered to address bed mobility, sit <> stand, bed <> chair transfers. Gait training to address gait deficits and assess use of RW. Neuromuscular re-education to address balance impairments and reduce fall risk. Patient and family education. Stair training to address stair negotiation for safe entrance into home. Using a comparative statement, summarize significant progress toward goals as a result of skilled intervention provided:  Patient has made Good progress towards their Physical Therapy goals in the areas of bed mobility, sit <> stand and bed <> w/c transfers, ambulation using RW, stair negotiation. Pt has progressed from CGA and SBA to close supervision. Identify remaining functional areas, impairments limiting progress and/or barriers to improvement:  Patient would benefit from continues PT services to address the following functional deficits in standing balance, transfers, ambulation using RW, stair negotiation using R HR. Barriers to improvement include: R hip involvement.      OBJECTIVE DATA SUMMARY:     INITIAL ASSESSMENT WEEKLY ASSESSMENT   COGNITIVE STATUS COGNITIVE STATUS   Neurologic State: Alert  Orientation Level: Oriented X4  Cognition: Appropriate decision making, Appropriate for age attention/concentration, Appropriate safety awareness, Follows commands  Perception: Appears intact  Perseveration: No perseveration noted  Safety/Judgement: Fall prevention Neurologic State: Alert, Appropriate for age  Orientation Level: Oriented X4  Cognition: Appropriate for age attention/concentration  Perception: Appears intact  Perseveration: No perseveration noted  Safety/Judgement: Fall prevention   PAIN PAIN   Pain Scale 1: Numeric (0 - 10)  Pain Intensity 1: 0  Pain Location 1: Shoulder  Pain Orientation 1: Right  Pain Description 1: Aching  Pain Intervention(s) 1: Distraction, Food, Repositioned  Patient Stated Pain Goal: 0  Pain Reassessment 1: Patient sleeping Pain Scale 1: Numeric (0 - 10)  Pain Intensity 1: 2  Pain Location 1: Shoulder  Pain Orientation 1: Right  Pain Description 1: Dull, Aching  Pain Intervention(s) 1: Medication (see MAR) (refused joint injection by Dr. Jacquelene Barthel)  Patient Stated Pain Goal: 0  Pain Reassessment 1: Patient sleeping   GROSS ASSESSMENT GROSS ASSESSMENT   AROM: Generally decreased, functional  PROM: Generally decreased, functional (LUE shoulder flexion 100 degrees)  Strength: Generally decreased, functional (R hip 3-/5, R knee 4+5; LLE grossly 4+/5)  Coordination: Generally decreased, functional  Tone: Normal  Sensation: Intact (BUEs) AROM: Generally decreased, functional (LUE shoulder flexion 90 degrees, RUE WFL)  PROM: Generally decreased, functional (LUE shoulder flexion 100 degrees)  Strength: Generally decreased, functional (BUEs 3+/5)  Coordination: Generally decreased, functional (BUEs)  Tone: Normal (BUEs)  Sensation: Intact (BUEs)   BED MOBILITY BED MOBILITY   Rolling:  (not assessed)  Supine to Sit: Minimum assistance (for upper body )  Scooting: Stand-by assistance Rolling:  (not assessed)  Supine to Sit: Stand-by assistance  Scooting: Stand-by assistance   GAIT GAIT   Base of Support: Center of gravity altered  Speed/Mckenna: Pace decreased (<100 feet/min)  Step Length: Left shortened, Right shortened  Gait Abnormalities: Decreased step clearance, Toe walking  Ambulation - Level of Assistance: Contact guard assistance  Distance (ft): 120 Feet (ft)  Assistive Device: Gait belt, Walker, rolling  Rail Use: Both  Stairs - Level of Assistance: Contact guard assistance  Number of Stairs Trained: 5  Interventions: Safety awareness training, Verbal cues Base of Support: Center of gravity altered  Speed/Mckenna: Slow  Step Length: Left shortened, Right shortened  Gait Abnormalities: Antalgic  Ambulation - Level of Assistance:  (close supervision)  Distance (ft): 154 Feet (ft)  Assistive Device: Walker, rolling  Rail Use: Both  Stairs - Level of Assistance: Stand-by assistance  Number of Stairs Trained: 5  Interventions: Verbal cues   Exceptions to WDL Exceptions to WDL               TRANSFERS TRANSFERS   Sit to Stand: Contact guard assistance  Stand to Sit: Contact guard assistance Sit to Stand:  (close supervision)  Stand to Sit:  (close supervision)   BALANCE BALANCE   Sitting: Without support  Sitting - Static: Good (unsupported)  Sitting - Dynamic: Fair (occasional) (+)  Standing: With support  Standing - Static: Fair  Standing - Dynamic : Fair Sitting: Intact  Sitting - Static: Good (unsupported)  Sitting - Dynamic: Good (unsupported)  Standing: With support  Standing - Static: Good  Standing - Dynamic : Fair   WHEELCHAIR MOBILITY/MGMT WHEELCHAIR MOBILITY/MGMT         Activity Tolerance:  Fair Activity Tolerance: Good   Visual/Perceptual   Corrective Lenses: Contacts      Visual/Perceptual   Vision  Corrective Lenses: Contacts        Auditory:   Auditory Impairment: None    Auditory:   Auditory  Auditory Impairment: None       Steps: right   Clinical Decision makin Clinical Decision makin+ [place] Standing Balance Scale     Treatment:   Pt presents sitting in w/c. Gait training x 154 ft using RW with close supervision. Stair training x 5 stairs using B HR with SBA, x 5 stairs using R HR with CGA.  Dynamic standing balance x ~ 2 minutes with single UE support during UE activity with facilitation of reaching outside MURPHY. TE rendered to address strength, endurance, mobility and included: LAQ, heel raises, toe raises 20 reps x 2 sets with verbal cueing for slower speed. Pt progressing well, POC. Patient's response to treatment rendered:  Good    Patient expected Discharge Location:  [x]Private Residence  [] detention/ILF  []LTC  []Other:     Plan: Continue Skilled PT services as established by the Plan of Care for 3-6 times a week. PT and Assistant have had a weekly case conference regarding the above treatment:  [x] Yes     [] No       Treatment session:  49 minutes. Therapist: Hugh Marquez PT       Date:10/1/2018  Forward to PT for co-signature when completed.

## 2018-10-01 NOTE — PROGRESS NOTES
Problem: Self Care Deficits Care Plan (Adult)  Goal: *Acute Goals and Plan of Care (Insert Text)  OCCUPATIONAL THERAPY SHORT TERM GOALS     Updated 10/01/18    1. Patient will perform Upper body ADL's with adaptive equipment with supervision/set-up. 2.  Patient will perform Lower body ADL's with adaptive equipment as needed with CGA. 3.  Patient will perform toileting task with standby assist with Good safety to reduce falls risk. 4.  Patient will perform functional transfers with Rolling Walker and supervision/set-up. 5.  Patient will perform grooming task in standing for 10 minutes using RW with Supervision and Good safety awareness. 6.  Patient will improve Barthel index scores to atleast 80/100 to improve functional mobility  7. Patient will utilize energy conservation techniques during functional activities with minimal verbal cues. Initiated 9/25/2018 and to be accomplished within 2 Week(s)    1. Patient will perform Upper body ADL's with adaptive equipment with supervision/set-up. (progressing-currently SBA)  2.  Patient will perform Lower body ADL's with adaptive equipment as needed with moderate assistance. (goal met-UPG CGA)  3. Patient will perform toileting task with standby assist with Good safety to reduce falls risk. (progressing-currently CGA)  4. Patient will perform functional transfers with Rolling Walker and supervision/set-up. (progressing-currently SBA)  5. Patient will perform grooming task in standing for 5 minutes using RW with SBA and Good safety awareness. (goal met-UPG 10 mins with Supervision)  6. Patient will improve Barthel index scores to atleast 65/100 to improve functional mobility. (goal met-UPG 80/100)  7. Patient will utilize energy conservation techniques during functional activities with minimal verbal cues. (progressing)    OCCUPATIONAL THERAPY LONG TERM GOALS   Initiated 9/25/2018 and to be accomplished within 4 Week(s)    1.   Patient will perform ADL's & IADLs with adaptive equipment as needed with modified independence. 2.  Patient will perform toileting task with modified independence with Good safety to reduce falls risk. 3.  Patient will perform all functional transfers utilizing least restrictive device and durable medical equipment as needed with modified independence and Good balance and safety awareness. 4.  Patient will perform standing static/dynamic activity for improved ADL/IADL function with modified independence and Good balance and safety awareness. 5.  Patient will improve Barthel index score to 95/100 to improve independence with mobility. 6. Patient will perform home making tasks and simple meal prep Mod I utilizing energy conservation techniques and good safety awareness. Therapist: Jennyfer Lama    9/25/2018            [facility]  OCCUPATIONAL THERAPY WEEKLY SUMMARY   Reporting period:  from 9/25/18 through 10/01/18       Patient: Jeffrey Davila (89 y.o. female)   Date: 10/1/2018    Primary Diagnosis: afib       Attending Physician: Ree Taylor MD Treatment Diagnosis  Treatment Diagnosis: increased need for assist with self care  Treatment Diagnosis 2: muscle weakness  Precautions: Fall  Rehab Potential : Good    Skill interventions and education provided with clinical rationale (include individualized treatment techniques and standardized tests):  Skilled Occupational services were provided utilizing ADL retraining, safety awareness and energy conservation techniques, standing activities incorporating balance retraining, therapeutic exercise and activities incorporating strengthening in order to improve ADL performance skills and functional mobility.   Using a comparative statement, summarize significant progress toward goals as a result of skilled intervention provided:  Patient has made Good progress towards their Occupational Therapy goals in the following areas: increased independence, performance, and static standing balance needed to safely complete grooming, bathing, upper body dressing, lower body dressing, toileting and toilet transfer using Reacher. Patient has met 3/7 STGS and making slow but steady progression towards LTGS. Identify remaining functional areas, impairments limiting progress and/or barriers to improvement:  Patient would benefit from continued skilled Occupational Therapy Services to address the following functional deficits in decreased dynamic standing balance and tolerance needed for safely complete ADL/IADLS.         OBJECTIVE DATA SUMMARY:       INITIAL ASSESSMENT WEEKLY PROGRESS   COGNITIVE STATUS: COGNITIVE STATUS:   Neurologic State: Alert  Orientation Level: Oriented X4  Cognition: Appropriate decision making, Appropriate for age attention/concentration, Appropriate safety awareness, Follows commands  Perception: Appears intact  Perseveration: No perseveration noted  Safety/Judgement: Fall prevention Neurologic State: Alert, Appropriate for age  Orientation Level: Oriented X4  Cognition: Appropriate for age attention/concentration  Perception: Appears intact  Perseveration: No perseveration noted  Safety/Judgement: Fall prevention   PAIN: PAIN:   Pain Scale 1: Numeric (0 - 10)  Pain Intensity 1: 0  Pain Location 1: Shoulder  Pain Orientation 1: Right  Pain Description 1: Aching  Pain Intervention(s) 1: Distraction, Food, Repositioned  Patient Stated Pain Goal: 0  Pain Reassessment 1: Patient sleeping     Pain Scale 1: Numeric (0 - 10)  Pain Intensity 1: 2  Pain Location 1: Shoulder  Pain Orientation 1: Right  Pain Description 1: Dull, Aching  Pain Intervention(s) 1: Medication (see MAR) (refused joint injection by Dr. Roman Plummer)  Patient Stated Pain Goal: 0  Pain Reassessment 1: Patient sleeping   BED MOBILITY BED MOBILITY   Rolling:  (not assessed)  Supine to Sit: Minimum assistance (for upper body )  Scooting: Stand-by assistance Rolling:  (not assessed)  Supine to Sit: Stand-by assistance  Scooting: Stand-by assistance   ADL SELF CARE ADL SELF CARE   Type of Bath: Basin/Soap/Water Bathing Assistance: Contact guard assistance    Dressing Assistance: Contact guard assistance    Bathing Assistance: Maximum assistance    Toileting Assistance: Contact guard assistance  Bladder Hygiene: Contact guard assistance  Bowel Hygiene: Contact guard assistance  Clothing Management: Contact guard assistance    Grooming Assistance: Contact guard assistance  Brushing Teeth: Contact guard assistance (in stance w/ RW)    Dressing Assistance: Minimum assistance  Pants With Elastic Waist: Minimum assistance  Leg Crossed Method Used: No  Position Performed: Bending forward method, Seated edge of bed    Feeding Assistance: Modified independent   TRANSFERS TRANSFERS   Sit to Stand: Contact guard assistance  Stand to Sit: Contact guard assistance  Toilet Transfer : Contact guard assistance (w/ RW & 3 in 1 commode over toilet) Sit to Stand: Stand-by assistance  Stand to Sit: Stand-by assistance  Toilet Transfer : Contact guard assistance (w/ RW & 3 in 1 commode over toilet)   Toilet Transfer : Contact guard assistance (w/ RW & 3 in 1 commode over toilet) Toilet Transfer : Contact guard assistance (w/ RW & 3 in 1 commode over toilet)   BALANCE BALANCE   Sitting: Without support  Sitting - Static: Good (unsupported)  Sitting - Dynamic: Fair (occasional) (+)  Standing: With support  Standing - Static: Fair  Standing - Dynamic : Fair Sitting: Intact  Sitting - Static: Good (unsupported)  Sitting - Dynamic: Good (unsupported)  Standing: With support  Standing - Static: Good  Standing - Dynamic : Fair       GROSS ASSESSMENT  GROSS ASSESSMENT   AROM: Generally decreased, functional  PROM: Generally decreased, functional (LUE shoulder flexion 100 degrees)  Strength: Generally decreased, functional (R hip 3-/5, R knee 4+5; LLE grossly 4+/5)  Coordination: Generally decreased, functional  Tone: Normal  Sensation: Intact (BUEs) AROM: Generally decreased, functional (LUE shoulder flexion 90 degrees, RUE WFL)  PROM: Generally decreased, functional (LUE shoulder flexion 100 degrees)  Strength: Generally decreased, functional (BUEs 3+/5)  Coordination: Generally decreased, functional (BUEs)  Tone: Normal (BUEs)  Sensation: Intact (BUEs)   COORDINATION COORDINATION   Fine Motor Skills-Upper: Left Intact, Right Intact  Gross Motor Skills-Upper: Left Intact, Right Intact Fine Motor Skills-Upper: Left Intact, Right Intact  Gross Motor Skills-Upper: Left Intact, Right Intact   VISUAL/PERCEPTUAL VISUAL/PERCEPTUAL   Corrective Lenses: Contacts   Corrective Lenses: Contacts     AUDITORY: AUDITORY:   Auditory Impairment: None Auditory Impairment: None       INSTRUMENTAL  ADL'S:  Instrumental ADL  Medication Management: Modified independent INSTRUMENTAL ADL'S:  Instrumental ADL  Medication Management: Modified independent       THE BARTHEL INDEX  ACTIVITY   SCORE   FEEDING  0=unable  5=needs help cutting,spreading butter,etc., or modified diet  10= independent   10   BATHING  0=dependent  5=independent (or in shower   0   GROOMING  0=needs help  5=independent face/hair/teeth/shaving (implements provided)   5   DRESSING  0=dependent  5=needs help but can do about half unaided  10=independent(including buttons, zips,laces etc.)   5   BOWELS  0=incontinent  5=occasional accident  10=continent   10   BLADDER  0=incontinent, or catheterized and unable to manage alone  5=occasional accident  10=continent   10   TOILET USE  0=dependent  5=needs some help, but can do something alone  10=independent (on and off, dressing, wiping)   5   TRANSFER (BED TO CHAIR AND BACK)  0=unable, no sitting balance  5=major help(one or two people,physical), can sit  10=minor help(verbal or physical)  15=independent   10   MOBILITY (ON LEVEL SURFACES)  0=immobile or <50 yards  5=wheelchair independent,including corners,>50 yards  10=walkes with help of one person (verbal or physical) >50 yards  15=independent(but may use any aid; for example, stick) >50 yards   10   STAIRS  0=unable  5=needs help (verbal, physical, carrying aid)  10=independent   5            TOTAL:                 70/100     Treatment:  Functional mobility utilizing RW from patient's room to therapy room in order to increase functional activity tolerance and independence during task. Patient able to complete full mobility with SBA and no rest break needed. Discussed with patient previous medication management routine in order to determine appropriate cueing or AD needed for optimal independence. Patient states she has an organizer however has not utilize. Discussed current medications and instructions on how to properly administer for optimal independence. Patient able to verbalized proper medication management with Mod I. Shadowed patient with LB dressing in order to assess independence and performance during task. Patient able to complete with Min A for threading feet through due to difficulty locating pants legs. Patient reports she has this difficulty at home as well as utilizes reacher when having difficulty. Static standing activity with two hand release in order to increase standing balance and tolerance needed for ADLS. Patient was able to tolerate standing for 10 mins with Supervision prior to requesting to sit. Patient's response to Treatment rendered:  Patient is pleasant and receptive to therapist cueing. Patient expected Discharge Location:  [x]Private Residence  [] [facility]  []LTC  []Other:    Plan: Continue OT services as established on the Plan of Care for 3-6 times a week.     Treatment Minutes:  50  OT and Assistant have had a weekly case conference regarding the above treatment:  [x] Yes     [] No    Therapist:   Valarie Elizondo,         Date:10/1/2018     Forward to OT for co-signature when completed

## 2018-10-01 NOTE — PROGRESS NOTES
I have reviewed this patient's current medication list and recent laboratory results. At this time, I do not suggest any drug therapy adjustments or additional laboratory monitoring. Thank you,  Latonia ALLEN  Ph. M. S.  10/1/2018

## 2018-10-01 NOTE — ROUTINE PROCESS
Bedside shift change report given to Nahun Ray by Patricio Hernandez. Report included the following information SBAR, Kardex and MAR.

## 2018-10-01 NOTE — PROGRESS NOTES
SW met with pt to complete the MDS interview. Pt exhibited signs and symptoms of moderate depression. Symptoms included: loss of interest in things, feeling depressed, low energy and moving more slowly than usual. Pt reports feeling lonely due to not having relatives in the area or they are . SW informed pt again that skilled nursing would provide an opportunity to be around other people and participate in activities. Pt informed SW again that her desire is to return home and hire someone to assist with shopping and other errands. JAMILA agreed to assist pt tomorrow with call to [facility] to discuss their services. SW provided brochures for pt to read today.

## 2018-10-02 PROCEDURE — 74011250636 HC RX REV CODE- 250/636: Performed by: INTERNAL MEDICINE

## 2018-10-02 PROCEDURE — 74011000250 HC RX REV CODE- 250: Performed by: INTERNAL MEDICINE

## 2018-10-02 PROCEDURE — 90686 IIV4 VACC NO PRSV 0.5 ML IM: CPT | Performed by: INTERNAL MEDICINE

## 2018-10-02 PROCEDURE — 74011250637 HC RX REV CODE- 250/637: Performed by: INTERNAL MEDICINE

## 2018-10-02 PROCEDURE — 90471 IMMUNIZATION ADMIN: CPT

## 2018-10-02 RX ADMIN — PANTOPRAZOLE SODIUM 40 MG: 40 TABLET, DELAYED RELEASE ORAL at 09:10

## 2018-10-02 RX ADMIN — METOPROLOL TARTRATE 25 MG: 25 TABLET, FILM COATED ORAL at 17:52

## 2018-10-02 RX ADMIN — POLYETHYLENE GLYCOL 3350 17 G: 17 POWDER, FOR SOLUTION ORAL at 09:10

## 2018-10-02 RX ADMIN — APIXABAN 2.5 MG: 2.5 TABLET, FILM COATED ORAL at 09:10

## 2018-10-02 RX ADMIN — INFLUENZA VIRUS VACCINE 0.5 ML: 15; 15; 15; 15 SUSPENSION INTRAMUSCULAR at 22:03

## 2018-10-02 RX ADMIN — ACETAMINOPHEN 650 MG: 325 TABLET, FILM COATED ORAL at 09:10

## 2018-10-02 RX ADMIN — APIXABAN 2.5 MG: 2.5 TABLET, FILM COATED ORAL at 17:52

## 2018-10-02 RX ADMIN — METOPROLOL TARTRATE 25 MG: 25 TABLET, FILM COATED ORAL at 09:10

## 2018-10-02 RX ADMIN — ATORVASTATIN CALCIUM 40 MG: 40 TABLET, FILM COATED ORAL at 22:03

## 2018-10-02 RX ADMIN — DILTIAZEM HYDROCHLORIDE 180 MG: 180 CAPSULE, EXTENDED RELEASE ORAL at 09:10

## 2018-10-02 NOTE — ROUTINE PROCESS
Bedside and verbal shift report given to FRANCOIS Lora (oncoming nurse) by MARTHA Caraballo LPN (off going nurse).  Report included SBAR, MAR and Kardex

## 2018-10-02 NOTE — PROGRESS NOTES
Problem: Self Care Deficits Care Plan (Adult)  Goal: *Acute Goals and Plan of Care (Insert Text)  OCCUPATIONAL THERAPY SHORT TERM GOALS     Updated 10/01/18    1. Patient will perform Upper body ADL's with adaptive equipment with supervision/set-up. 2.  Patient will perform Lower body ADL's with adaptive equipment as needed with CGA. 3.  Patient will perform toileting task with standby assist with Good safety to reduce falls risk. 4.  Patient will perform functional transfers with Rolling Walker and supervision/set-up. 5.  Patient will perform grooming task in standing for 10 minutes using RW with Supervision and Good safety awareness. 6.  Patient will improve Barthel index scores to atleast 80/100 to improve functional mobility  7. Patient will utilize energy conservation techniques during functional activities with minimal verbal cues. Initiated 9/25/2018 and to be accomplished within 2 Week(s)    1. Patient will perform Upper body ADL's with adaptive equipment with supervision/set-up. (progressing-currently SBA)  2.  Patient will perform Lower body ADL's with adaptive equipment as needed with moderate assistance. (goal met-UPG CGA)  3. Patient will perform toileting task with standby assist with Good safety to reduce falls risk. (progressing-currently CGA)  4. Patient will perform functional transfers with Rolling Walker and supervision/set-up. (progressing-currently SBA)  5. Patient will perform grooming task in standing for 5 minutes using RW with SBA and Good safety awareness. (goal met-UPG 10 mins with Supervision)  6. Patient will improve Barthel index scores to atleast 65/100 to improve functional mobility. (goal met-UPG 80/100)  7. Patient will utilize energy conservation techniques during functional activities with minimal verbal cues. (progressing)    OCCUPATIONAL THERAPY LONG TERM GOALS   Initiated 9/25/2018 and to be accomplished within 4 Week(s)    1.   Patient will perform ADL's & IADLs with adaptive equipment as needed with modified independence. 2.  Patient will perform toileting task with modified independence with Good safety to reduce falls risk. 3.  Patient will perform all functional transfers utilizing least restrictive device and durable medical equipment as needed with modified independence and Good balance and safety awareness. 4.  Patient will perform standing static/dynamic activity for improved ADL/IADL function with modified independence and Good balance and safety awareness. 5.  Patient will improve Barthel index score to 95/100 to improve independence with mobility. 6. Patient will perform home making tasks and simple meal prep Mod I utilizing energy conservation techniques and good safety awareness. Therapist: Chuck Beach    9/25/2018            [facility]   Occupational Therapy Daily Treatment note      Patient: Lucinda Fuchs (89 y.o. female)       Date: 10/2/2018  Attending Physician: Ashley Armas MD  Primary Diagnosis: afib    Treatment Diagnosis  Treatment Diagnosis: increased need for assist with self care  Treatment Diagnosis 2: muscle weakness   Precautions : Precautions at Admission: Fall  Vital Signs:  Vital Signs  Temp: 97.6 °F (36.4 °C)  Temp Source: Oral  Pulse (Heart Rate): 77  Resp Rate: 20  O2 Sat (%): 97 %  BP: 183/69 (nurse notified)  MAP (Calculated): 107  BP 1 Method: Automatic  BP 1 Location: Right arm  BP Patient Position: At rest     Cognitive Status:  Mental Status  Neurologic State: Alert; Appropriate for age  Orientation Level: Oriented X4  Pain:  Pain Screen  Pain Scale 1: Numeric (0 - 10)  Pain Intensity 1: 3  Pain Location 1: Shoulder  Pain Orientation 1: Right  Pain Description 1: Dull;Aching  Pain Intervention(s) 1: Medication (see MAR); Distraction;Repositioned; Food  Pain Scale 1: Numeric (0 - 10)  Gross Assessment:     Coordination:     Bed Mobility:     Transfers:  Functional Transfers  Sit to Stand: Modified independent  Bathroom Mobility: Modified independent  Toilet Transfer : Modified independent  Functional Transfers  Bathroom Mobility: Modified independent  Toilet Transfer : Modified independent  Balance:  Balance  Sitting: Intact  Sitting - Static: Good (unsupported)  Sitting - Dynamic: Good (unsupported)  Standing: With support  Standing - Static: Good  Standing - Dynamic : Fair (+)  ADL Self Care:     ADL Intervention:              Grooming  Grooming Assistance: Modified independent (standing, sinkside)  Washing Face: Modified independent             Therapeutic Activities:  Shadowed patient with grooming sinkside in order to assess safety and independence during routine. KENNEY cleared patient to functional mobility within room with Mod I utilizing RW. Cueing needed for patient to avoid extensive reaching over RW for optimal safety. Static standing activity with one hand release in order to increase standing balance and tolerance needed for ADLS. Patient was able to tolerate standing for 6 mins with Supervision for optimal safety. Therapeutic Exercises:  UB strengthening with 3#, 2 sets x 20 reps in order to increase functional activity tolerance needed for ADLS. Patient/Caregiver Education:    Pt. Nery Garay Education on see above.       ASSESSMENT:  Patient continues to demonstrate the need for skilled Occupational Therapy services to improve dynamic standing balance needed for simple home management  Progression toward goals:  [x]      Improving appropriately and progressing toward goals  []      Improving slowly and progressing toward goals  []      Not making progress toward goals and plan of care will be adjusted     Treatment session:   47 minutes    Therapist:    ANNE MARIE Naik,  10/2/2018

## 2018-10-02 NOTE — PROGRESS NOTES
Nutrition follow-up/Plan of care tcc     RECOMMENDATIONS:     1. Cardiac Diet  2. Monitor weight, labs and PO intake  3. RD to follow     GOALS:     1. Ongoing: PO intake meets >75% of protein/calorie needs by 10/9  2. Ongoing: Weight Maintenance (+/- 1-2 lb) by 10/9     ASSESSMENT:     Weight status is classified as normal per BMI of 22.2. However, patient is at nutrition risk due to BMI below 23 with patient above 72years of age. PO intake is adequate. No new labs. Nutrition recommendations listed. RD to follow. Nutrition Risk:  [] High  [] Moderate [x]  Low    SUBJECTIVE/OBJECTIVE:      (9/21): Pt admitted with atrial fibrillation. Patient reports having a good appetite and eating all of meals. Observed 100% intake of lunch meal during visit. States weight of 145 lb back in 1992 but weight has recently been stable at 125 lb for several years. Denies food allergies and problems with chewing/swallowing. Will monitor.    (9/25): Pt seen in room OOB in chair at dinner. Reports having a good appetite and consuming more food here than she does at home. No issues to address at this time. Will monitor.     (10/2): Pt seen in room OOB in chair at lunch; observed 100% of meal consumed. Reports she is doing well  And nothing to address at this time. Weight is stable. Will monitor.     Information Obtained from:    [x] Chart Review   [x] Patient   [] Family/Caregiver   [] Nurse/Physician   [] Interdisciplinary Meeting/Rounds    Diet: Cardiac Diet  Medications: [x] Reviewed   Allergies: [x] Reviewed   Patient Active Problem List   Diagnosis Code    Bilateral nephrolithiasis N20.0    Renal cyst, left N28.1    New onset atrial fibrillation (HCC) I48.91    HTN (hypertension) I10    CAD (coronary artery disease) I25.10    Hypercholesteremia E78.00    GERD (gastroesophageal reflux disease) K21.9    Stomatitis and mucositis K12.1, K12.30       Past Medical History:   Diagnosis Date    Coronary artery disease     Degenerative joint disease     Degenerative spine disease     Dyslipidemia     Hypertension     Hypertension     Kidney stone     Osteoarthritis       Labs:    Lab Results   Component Value Date/Time    Sodium 141 09/20/2018 11:12 AM    Potassium 4.3 09/20/2018 11:12 AM    Chloride 106 09/20/2018 11:12 AM    CO2 27 09/20/2018 11:12 AM    Anion gap 8 09/20/2018 11:12 AM    Glucose 104 (H) 09/20/2018 11:12 AM    BUN 26 (H) 09/20/2018 11:12 AM    Creatinine 1.19 09/20/2018 11:12 AM    Calcium 8.9 09/20/2018 11:12 AM    Magnesium 1.9 09/03/2011 08:15 PM    Albumin 3.7 04/09/2018 06:24 PM     Anthropometrics: BMI (calculated): 22.2  Last 3 Recorded Weights in this Encounter    09/24/18 1632   Weight: 58.7 kg (129 lb 6.4 oz)      Ht Readings from Last 1 Encounters:   09/24/18 5' 4\" (1.626 m)     No data found.      UBW: 125 lb   [] Weight Loss [] Weight Gain [x] Weight Stable    Estimated Nutrition Needs: [x] MSJ    Calories: 2088-1036 Kcal Based on:   [x] Actual BW    Protein:   60-70 g Based on:   [x] Actual BW    Fluid:       1500 ml Based on:   [x] Actual BW        Nutrition Problems Identified:   [] Suboptimal PO intake   [] Food Allergies  [] Difficulty chewing/swallowing/poor dentition  [] Constipation/Diarrhea   [] Nausea/Vomiting   [x] None  [] Other:     Plan:   [x] Therapeutic Diet  []  Obtained/adjusted food preferences/tolerances and/or snacks options   []  Supplements added   [] Occupational therapy following for feeding techniques  []  HS snack added   []  Modify diet texture   []  Modify diet for food allergies    []  Assist with menu selection   [x]  Monitor PO intake on meal rounds   [x]  Continue inpatient monitoring and intervention   [x]  Participated in discharge planning/Interdisciplinary rounds/Team meetings   []  Other:     Education Needs:   [] Not appropriate for teaching at this time due to:   [x] Identified and addressed    Nutrition Monitoring and Evaluation:  [x] Continue ongoing monitoring and intervention  [] Other    [facility]

## 2018-10-02 NOTE — ROUTINE PROCESS
Bedside and Verbal shift change report given to [address] (oncoming nurse) by Nicholas Moore, RN,me (offgoing nurse). Report included the following information SBAR, Kardex and MAR. Qh visual checks and 24h chart checks done.

## 2018-10-02 NOTE — PROGRESS NOTES
The patient was offered a group activity of listening to music played by a guitarist on October 1, 2018. The patient did not want to attend the beginning of the group due to being tired from just returning to therapy. The patient did attend the group from 11:45-12:05. While attending the group the patient sang along with the songs being played and engaged in conversation with the guitarist and other patients attending the group. The  will continue to offer group and 1:1 activities and encourage the patient to participate in the activities.

## 2018-10-02 NOTE — PROGRESS NOTES
Problem: Mobility Impaired (Adult and Pediatric)  Goal: *Acute Goals and Plan of Care (Insert Text)  PHYSICAL THERAPY STG GOALS :  Initiated 9/25/2018 and to be accomplished within 2 Weeks (updated 10/01/18)    1. Patient will move from supine to sit and sit to supine  and roll side to side in bed with supervision/set-up. (Achieved)     2. Patient will transfer from bed to chair and chair to bed with stand by assistance using RW. (Achieved)  3. Patient will perform sit to stand with stand by assistance using RW with Fair+ balance and safety awareness. (Achieved)  4. Patient will ambulate with stand by assistance for 150 feet with RW on level surfaces with 2 turns. (Achieved)  5. Patient will ascend/descend 3 stairs with right handrail(s) with contact guard assist to allow for safe home access/exit. (Achieved)  6. Patient will improve standardized test score for [place] Standing Balance Scale 3 to reduce fall risk. (Progressing; 2+)    PHYSICAL THERAPY LTG GOALS :  Initiated 9/25/2018 and to be accomplished within 4 Weeks    1. Patient will move from supine to sit and sit to supine  and roll side to side in bed with modified independence. 2.  Patient will transfer from bed to chair and chair to bed with modified independence using LRAD. 3.  Patient will perform sit to stand with modified independence with Good balance and safety awareness. 4.  Patient will ambulate with supervision/set-up for 200 feet with LRAD on level surfaces and be able to maneuver through narrow spaces and obstacles without loss of balance. 5.  Patient will ascend/descend 3 stairs with right handrail(s) with stand by assistance to allow for safe home access/exit. 6.  Patient will improve standardized test score for [place] Standing Balance Scale 4 to reduce fall risk.       Physical Therapist:   Susan Zabala, PT  on 9/25/2018             [address]  [facility]   physical Therapy Daily Treatment note      Patient: Augustine Do (89 y.o. female)               Date: 10/2/2018    Physician: Phillip Osgood, MD  Primary Diagnosis: afib          Treatment Diagnosis  Treatment Diagnosis: increased need for assist with self care  Treatment Diagnosis 2: muscle weakness  Precautions: Fall  Vital Signs  Vital Signs  Temp: 97.6 °F (36.4 °C)  Temp Source: Oral  Pulse (Heart Rate): 77  Resp Rate: 20  O2 Sat (%): 97 %  BP: 183/69 (nurse notified)  MAP (Calculated): 107  BP 1 Method: Automatic  BP 1 Location: Right arm  BP Patient Position: At rest     Cognitive Status:  Mental Status  Neurologic State: Alert; Appropriate for age  Orientation Level: Oriented X4  Pain  Pain Screen  Pain Scale 1: Numeric (0 - 10)  Pain Intensity 1: 3  Pain Location 1: Shoulder  Pain Orientation 1: Right  Pain Description 1: Dull;Aching  Pain Intervention(s) 1: Medication (see MAR); Distraction;Repositioned; Food  Bed Mobility Training  Bed Mobility Training  Rolling: Modified independent  Supine to Sit: Modified independent  Balance  Sitting: Intact  Sitting - Static: Good (unsupported)  Sitting - Dynamic: Good (unsupported)  Standing: With support  Standing - Static: Fair (+)  Standing - Dynamic : Fair (+)  Transfer Training  Transfer Training  Sit to Stand: Modified independent  Stand to Sit: Modified independent  Sit to Stand: Modified independent  Gait Training  Gait  Base of Support: Center of gravity altered  Speed/Mckenna: Slow  Step Length: Left shortened;Right shortened  Gait Abnormalities: Antalgic  Ambulation - Level of Assistance: Supervision  Distance (ft): 134 Feet (ft)  Assistive Device: Gait belt;Walker, rolling  Rail Use: Right   Stairs - Level of Assistance: Stand-by assistance  Number of Stairs Trained: 5  Interventions: Verbal cues     Gait Abnormalities: Antalgic            With 4 turns. Therapeutic Exercise:    Pt presents sitting in w/c, reporting being ready for a nap but agreeable to participation.  Gait training and stair training as mentioned above, improved fluency and step clearance with stair training using R HR. TE rendered to address strength, endurance, mobility and included: heel raises, toe raises, LAQ, marching 20 reps x 2 sets with minimal and verbal cueing for proper techniques. Patient/Caregiver Education:   Pt /Caregiver Education on continued HEP in room, pt agreeable. ASSESSMENT:  Patient continues to benefit from Skilled PT services to improve mobility. Progression toward goals:  [x]      Improving appropriately and progressing toward goals  []      Improving slowly and progressing toward goals  []      Not making progress toward goals and plan of care will be adjusted     Treatment session: 48 minutes.   Therapist:   Araceli Trujillo, PT,          10/2/2018

## 2018-10-02 NOTE — PROGRESS NOTES
The patient was offered a group activity of participating in an activity hour to engage in an activity of their choice (puzzles, cards, coloring, TV, reading etc.), with assistance from the  on September 28, 2018. The patient worked on a large piece puzzle and was able to complete it with minimal assistance. The patient engaged in conversation with other patients. The  will continue to offer group and 1:1 activities and encourage the patient to participate in the activities.

## 2018-10-02 NOTE — PROGRESS NOTES
SW spoke with Genet Nair of [facility] to request that she meet with pt to discuss personal care services at home. She agreed to come over to tomorrow at 1:30pm to meet with pt. JAMILA spoke with pt re: agency coming in tomorrow to discuss their services. Pt concerned that agency mayn't be in network with his long term care plan. SW informed pt that this agency works with several long term care plans and that staff person can clarify this with her insurance if needed.

## 2018-10-03 PROCEDURE — 74011250637 HC RX REV CODE- 250/637: Performed by: INTERNAL MEDICINE

## 2018-10-03 PROCEDURE — 74011000250 HC RX REV CODE- 250: Performed by: INTERNAL MEDICINE

## 2018-10-03 RX ADMIN — APIXABAN 2.5 MG: 2.5 TABLET, FILM COATED ORAL at 17:30

## 2018-10-03 RX ADMIN — PANTOPRAZOLE SODIUM 40 MG: 40 TABLET, DELAYED RELEASE ORAL at 10:06

## 2018-10-03 RX ADMIN — DILTIAZEM HYDROCHLORIDE 180 MG: 180 CAPSULE, EXTENDED RELEASE ORAL at 10:06

## 2018-10-03 RX ADMIN — APIXABAN 2.5 MG: 2.5 TABLET, FILM COATED ORAL at 10:06

## 2018-10-03 RX ADMIN — METOPROLOL TARTRATE 25 MG: 25 TABLET, FILM COATED ORAL at 10:06

## 2018-10-03 RX ADMIN — POLYETHYLENE GLYCOL 3350 17 G: 17 POWDER, FOR SOLUTION ORAL at 10:06

## 2018-10-03 RX ADMIN — ATORVASTATIN CALCIUM 40 MG: 40 TABLET, FILM COATED ORAL at 21:12

## 2018-10-03 RX ADMIN — METOPROLOL TARTRATE 25 MG: 25 TABLET, FILM COATED ORAL at 17:30

## 2018-10-03 NOTE — ROUTINE PROCESS
Bedside and verbal shift report given to Stefany Guadarrama RN (oncoming nurse) by MARTHA Rivers LPN (off going nurse). Report included SBAR, MAR and Kardex.

## 2018-10-03 NOTE — PROGRESS NOTES
The patient was offered 1:1 activities with the  on October 1, 2018. The patient spent time talking to the  about the news and family. The  will continue to offer group and 1:1 activities and encourage the patient to participate in the activities.

## 2018-10-03 NOTE — PROGRESS NOTES
Problem: Self Care Deficits Care Plan (Adult)  Goal: *Acute Goals and Plan of Care (Insert Text)  OCCUPATIONAL THERAPY SHORT TERM GOALS     Updated 10/01/18    1. Patient will perform Upper body ADL's with adaptive equipment with supervision/set-up. 2.  Patient will perform Lower body ADL's with adaptive equipment as needed with CGA. 3.  Patient will perform toileting task with standby assist with Good safety to reduce falls risk. 4.  Patient will perform functional transfers with Rolling Walker and supervision/set-up. 5.  Patient will perform grooming task in standing for 10 minutes using RW with Supervision and Good safety awareness. 6.  Patient will improve Barthel index scores to atleast 80/100 to improve functional mobility  7. Patient will utilize energy conservation techniques during functional activities with minimal verbal cues. Initiated 9/25/2018 and to be accomplished within 2 Week(s)    1. Patient will perform Upper body ADL's with adaptive equipment with supervision/set-up. (progressing-currently SBA)  2.  Patient will perform Lower body ADL's with adaptive equipment as needed with moderate assistance. (goal met-UPG CGA)  3. Patient will perform toileting task with standby assist with Good safety to reduce falls risk. (progressing-currently CGA)  4. Patient will perform functional transfers with Rolling Walker and supervision/set-up. (progressing-currently SBA)  5. Patient will perform grooming task in standing for 5 minutes using RW with SBA and Good safety awareness. (goal met-UPG 10 mins with Supervision)  6. Patient will improve Barthel index scores to atleast 65/100 to improve functional mobility. (goal met-UPG 80/100)  7. Patient will utilize energy conservation techniques during functional activities with minimal verbal cues. (progressing)    OCCUPATIONAL THERAPY LONG TERM GOALS   Initiated 9/25/2018 and to be accomplished within 4 Week(s)    1.   Patient will perform ADL's & IADLs with adaptive equipment as needed with modified independence. 2.  Patient will perform toileting task with modified independence with Good safety to reduce falls risk. 3.  Patient will perform all functional transfers utilizing least restrictive device and durable medical equipment as needed with modified independence and Good balance and safety awareness. 4.  Patient will perform standing static/dynamic activity for improved ADL/IADL function with modified independence and Good balance and safety awareness. 5.  Patient will improve Barthel index score to 95/100 to improve independence with mobility. 6. Patient will perform home making tasks and simple meal prep Mod I utilizing energy conservation techniques and good safety awareness. Therapist: Cass Beavers    9/25/2018            [facility]   Occupational Therapy Daily Treatment note      Patient: Kim Tanner (89 y.o. female)       Date: 10/3/2018  Attending Physician: Violet Lopez MD  Primary Diagnosis: afib    Treatment Diagnosis  Treatment Diagnosis: increased need for assist with self care  Treatment Diagnosis 2: muscle weakness   Precautions : Precautions at Admission: Fall  Vital Signs:        Cognitive Status:  Mental Status  Neurologic State: Alert  Orientation Level: Oriented X4 (forgetful at times)  Cognition: Impulsive;Poor safety awareness  Pain:        Gross Assessment:     Coordination:     Bed Mobility:     Transfers:  Functional Transfers  Sit to Stand: Modified independent  Stand to Sit: Modified independent     Balance:  Balance  Sitting: Intact  Sitting - Static: Good (unsupported)  Sitting - Dynamic: Good (unsupported)  Standing: With support  Standing - Static: Good  Standing - Dynamic : Fair       Therapeutic Activities:  Static standing activity with one hand release in order to increase standing balance and tolerance needed for ADLS.  Patient was able tolerate standing for 6 mins with Supervision prior to requesting to sit. Patient reports increase R hip pain limiting her today. Therapeutic Exercises:  UB strengthening with 3#, 2 set x 20 reps in order to increase functional activity tolerance and UB muscle strength needed for ADLS. Patient/Caregiver Education:    Pt. Saqib Collazo Education on self pacing during standing task was provided for optimal safety.       ASSESSMENT:  Patient continues to demonstrate the need for skilled Occupational Therapy services to improve dynamic standing balance needed for bathing  Progression toward goals:  [x]      Improving appropriately and progressing toward goals  []      Improving slowly and progressing toward goals  []      Not making progress toward goals and plan of care will be adjusted     Treatment session:   48 minutes    Therapist:    ANNE MARIE Calabrese,  10/3/2018

## 2018-10-03 NOTE — PROGRESS NOTES
Problem: Mobility Impaired (Adult and Pediatric)  Goal: *Acute Goals and Plan of Care (Insert Text)  PHYSICAL THERAPY STG GOALS :  Initiated 9/25/2018 and to be accomplished within 2 Weeks (updated 10/01/18)    1. Patient will move from supine to sit and sit to supine  and roll side to side in bed with supervision/set-up. (Achieved)     2. Patient will transfer from bed to chair and chair to bed with stand by assistance using RW. (Achieved)  3. Patient will perform sit to stand with stand by assistance using RW with Fair+ balance and safety awareness. (Achieved)  4. Patient will ambulate with stand by assistance for 150 feet with RW on level surfaces with 2 turns. (Achieved)  5. Patient will ascend/descend 3 stairs with right handrail(s) with contact guard assist to allow for safe home access/exit. (Achieved)  6. Patient will improve standardized test score for [place] Standing Balance Scale 3 to reduce fall risk. (Progressing; 2+)    PHYSICAL THERAPY LTG GOALS :  Initiated 9/25/2018 and to be accomplished within 4 Weeks    1. Patient will move from supine to sit and sit to supine  and roll side to side in bed with modified independence. 2.  Patient will transfer from bed to chair and chair to bed with modified independence using LRAD. 3.  Patient will perform sit to stand with modified independence with Good balance and safety awareness. 4.  Patient will ambulate with supervision/set-up for 200 feet with LRAD on level surfaces and be able to maneuver through narrow spaces and obstacles without loss of balance. 5.  Patient will ascend/descend 3 stairs with right handrail(s) with stand by assistance to allow for safe home access/exit. 6.  Patient will improve standardized test score for [place] Standing Balance Scale 4 to reduce fall risk.       Physical Therapist:   Estrada Castillo, PT  on 9/25/2018             [address]  [facility]   physical Therapy Daily Treatment note      Patient: Medardo Cooper (89 y.o. female)               Date: 10/3/2018    Physician: Raquel Ospina MD  Primary Diagnosis: afib          Treatment Diagnosis  Treatment Diagnosis: increased need for assist with self care  Treatment Diagnosis 2: muscle weakness  Precautions: Fall  Vital Signs        Cognitive Status:  Mental Status  Neurologic State: Alert  Orientation Level: Oriented X4 (forgetful at times)  Cognition: Impulsive;Poor safety awareness  Pain     Bed Mobility Training     Balance  Sitting: Intact  Sitting - Static: Good (unsupported)  Sitting - Dynamic: Good (unsupported)  Standing: With support  Standing - Static: Good  Standing - Dynamic : Fair  Transfer Training  Transfer Training  Sit to Stand: Modified independent  Stand to Sit: Modified independent  Sit to Stand: Modified independent  Gait Training  Gait  Base of Support: Center of gravity altered  Speed/Mckenna: Pace decreased (<100 feet/min)  Step Length: Left shortened;Right shortened  Gait Abnormalities: Decreased step clearance  Ambulation - Level of Assistance: Supervision  Distance (ft): 154 Feet (ft)  Assistive Device: Walker, rolling  Rail Use: Right   Stairs - Level of Assistance:  (close supervision)  Number of Stairs Trained: 5     Gait Abnormalities: Decreased step clearance            With 4 turns. Therapeutic Exercise:    Gait training as mentioned above, continued toe walking on R. Pt describes R hip pain with weight bearing through RLE. Rest break administered. Stair training x 5 stairs with both hands on R HR with close supervision, pt c/o increased R hip pain with weight bearing during ascending and descending of stairs. Standing neuromuscular re-education and additional gait training were planned; however, not rendered due to increased R hip pain. Therapist inquired if pt received pain meds to address R hip pain, pt reports that pain meds make her \"jerky\" which scares her.  Seated TE rendered afterwards to address strength, endurance, mobility and included: heel raises, toe raises, resisted hip add, resisted hip abd using orange t-band 20 reps x 2 sets; LAQ 15 reps x 2 sets, minimal verbal cueing for proper techniques. Patient/Caregiver Education:   Pt /Caregiver Education on pain management for optimal patient participation and optimal pain control. ASSESSMENT:  Patient continues to benefit from Skilled PT services to improve balance, gait, stair negotiation. Progression toward goals:  [x]      Improving appropriately and progressing toward goals  []      Improving slowly and progressing toward goals  []      Not making progress toward goals and plan of care will be adjusted       Treatment session: 47 minutes.   Therapist:   Lyndsay Israel PT,          10/3/2018

## 2018-10-03 NOTE — PROGRESS NOTES
Late entry: Pt was seen today by Eusebia Cook of [employer] [facility] for personal care at home. Pt informed of the d/c date of 10/11/18. SW called pt's son Janine Bernal along with [employer] [facility] rep to inform him of the d/c date of 10/11/18 and that [facility] was willing to arrange personal care with pt. Pt's son stated that he has been exploring this for awhile with pt but she hasn't been receptive to anything. He is willing to be here for pt's d/c next week and arrange to meet with [facility] to discuss personal care at home.

## 2018-10-04 PROCEDURE — 74011000250 HC RX REV CODE- 250: Performed by: INTERNAL MEDICINE

## 2018-10-04 PROCEDURE — 74011250637 HC RX REV CODE- 250/637: Performed by: INTERNAL MEDICINE

## 2018-10-04 RX ADMIN — POLYETHYLENE GLYCOL 3350 17 G: 17 POWDER, FOR SOLUTION ORAL at 10:34

## 2018-10-04 RX ADMIN — PANTOPRAZOLE SODIUM 40 MG: 40 TABLET, DELAYED RELEASE ORAL at 10:34

## 2018-10-04 RX ADMIN — ATORVASTATIN CALCIUM 40 MG: 40 TABLET, FILM COATED ORAL at 21:47

## 2018-10-04 RX ADMIN — METOPROLOL TARTRATE 25 MG: 25 TABLET, FILM COATED ORAL at 17:39

## 2018-10-04 RX ADMIN — DILTIAZEM HYDROCHLORIDE 180 MG: 180 CAPSULE, EXTENDED RELEASE ORAL at 10:34

## 2018-10-04 RX ADMIN — APIXABAN 2.5 MG: 2.5 TABLET, FILM COATED ORAL at 10:34

## 2018-10-04 RX ADMIN — APIXABAN 2.5 MG: 2.5 TABLET, FILM COATED ORAL at 17:39

## 2018-10-04 RX ADMIN — ACETAMINOPHEN 650 MG: 325 TABLET, FILM COATED ORAL at 21:47

## 2018-10-04 RX ADMIN — METOPROLOL TARTRATE 25 MG: 25 TABLET, FILM COATED ORAL at 10:34

## 2018-10-04 NOTE — ROUTINE PROCESS
Bedside and Verbal shift change report given to Rainer (oncoming nurse) by Randolph Hodges RN (offgoing nurse). Report included the following information SBAR, Kardex and MAR.

## 2018-10-04 NOTE — PROGRESS NOTES
The patient was offered a group activity to discuss the morning news on October 2, 2018. The patient participated in this activity and completed two jigsaw puzzles while working with other patients to achieve the goal. The  will continue to offer group and 1:1 activities and encourage the patient to participate in the activities.

## 2018-10-04 NOTE — PROGRESS NOTES
Problem: Mobility Impaired (Adult and Pediatric)  Goal: *Acute Goals and Plan of Care (Insert Text)  PHYSICAL THERAPY STG GOALS :  Initiated 9/25/2018 and to be accomplished within 2 Weeks (updated 10/01/18)    1. Patient will move from supine to sit and sit to supine  and roll side to side in bed with supervision/set-up. (Achieved)     2. Patient will transfer from bed to chair and chair to bed with stand by assistance using RW. (Achieved)  3. Patient will perform sit to stand with stand by assistance using RW with Fair+ balance and safety awareness. (Achieved)  4. Patient will ambulate with stand by assistance for 150 feet with RW on level surfaces with 2 turns. (Achieved)  5. Patient will ascend/descend 3 stairs with right handrail(s) with contact guard assist to allow for safe home access/exit. (Achieved)  6. Patient will improve standardized test score for [place] Standing Balance Scale 3 to reduce fall risk. (Progressing; 2+)    PHYSICAL THERAPY LTG GOALS :  Initiated 9/25/2018 and to be accomplished within 4 Weeks    1. Patient will move from supine to sit and sit to supine  and roll side to side in bed with modified independence. 2.  Patient will transfer from bed to chair and chair to bed with modified independence using LRAD. 3.  Patient will perform sit to stand with modified independence with Good balance and safety awareness. 4.  Patient will ambulate with supervision/set-up for 200 feet with LRAD on level surfaces and be able to maneuver through narrow spaces and obstacles without loss of balance. 5.  Patient will ascend/descend 3 stairs with right handrail(s) with stand by assistance to allow for safe home access/exit. 6.  Patient will improve standardized test score for [place] Standing Balance Scale 4 to reduce fall risk.       Physical Therapist:   Lulu Vidal, PT  on 9/25/2018             [address]  [facility]   physical Therapy Daily Treatment note      Patient: Zana Tilley (89 y.o. female)               Date: 10/4/2018    Physician: Sigrid العلي MD  Primary Diagnosis: afib          Treatment Diagnosis  Treatment Diagnosis: increased need for assist with self care  Treatment Diagnosis 2: muscle weakness  Precautions: Fall  Vital Signs  Vital Signs  Temp: 97.8 °F (36.6 °C)  Temp Source: Oral  Pulse (Heart Rate): 62  Resp Rate: 18  O2 Sat (%): 97 %  BP: 141/54  MAP (Calculated): 83  BP 1 Method: Automatic  BP 1 Location: Right arm  BP Patient Position: At rest     Cognitive Status:     Pain  Pain Screen  Pain Scale 1: Numeric (0 - 10)  Pain Intensity 1: 0  Patient Stated Pain Goal: 0  Pain Reassessment 1: Patient sleeping  Bed Mobility Training     Balance  Sitting: Intact  Sitting - Static: Good (unsupported)  Sitting - Dynamic: Good (unsupported)  Standing: With support  Standing - Static: Good  Standing - Dynamic : Fair  Transfer Training  Transfer Training  Sit to Stand: Modified independent  Stand to Sit: Modified independent  Sit to Stand: Modified independent  Gait Training  Gait  Base of Support: Center of gravity altered  Speed/Mckenna: Pace decreased (<100 feet/min)  Step Length: Left shortened;Right shortened  Gait Abnormalities: Decreased step clearance; Step to gait  Ambulation - Level of Assistance: Supervision  Distance (ft): 154 Feet (ft)  Assistive Device: Gait belt;Walker, rolling     Gait Abnormalities: Decreased step clearance; Step to gait            With 4 turns. Therapeutic Exercise:    Pt presents in w/c. Gait training as mentioned above with supervision, c/o increased R hip pain. For this reason, stair training not rendered on this visit. TE rendered to address strength, endurance, mobility and included: heel raises, toe raises, marching, LAQ, resisted hip add, resisted hip abd using yellow t-band 20 reps x 2 sets with rest breaks to avoid fatigue, minimal verbal cueing for proper techniques.  Standing activities also not rendered due to increase in R hip pain. Patient/Caregiver Education:   Pt /Caregiver Education on continued compliance with HEP while in room, pt agreeable. ASSESSMENT:  Patient continues to benefit from Skilled PT services to improve strength, endurance, mobility. Progression toward goals:  []      Improving appropriately and progressing toward goals  [x]      Improving slowly and progressing toward goals  []      Not making progress toward goals and plan of care will be adjusted      Treatment session: 45 minutes.   Therapist:   Yaquelin Perez, PT,          10/4/2018

## 2018-10-04 NOTE — PROGRESS NOTES
The patient was offered a group activity to play card games on October 3, 2018. The patient declined this activity. The  will continue to offer group and 1:1 activities and encourage the patient to participate in the activities.

## 2018-10-04 NOTE — PROGRESS NOTES
Verbal shift change report given to Israel Garcia RN (oncoming nurse) by Deepa Chu RN (offgoing nurse). Report included the following information SBAR, Intake/Output and Recent Results.

## 2018-10-04 NOTE — PROGRESS NOTES
Problem: Self Care Deficits Care Plan (Adult)  Goal: *Acute Goals and Plan of Care (Insert Text)  OCCUPATIONAL THERAPY SHORT TERM GOALS     Updated 10/01/18    1. Patient will perform Upper body ADL's with adaptive equipment with supervision/set-up. 2.  Patient will perform Lower body ADL's with adaptive equipment as needed with CGA. 3.  Patient will perform toileting task with standby assist with Good safety to reduce falls risk. 4.  Patient will perform functional transfers with Rolling Walker and supervision/set-up. 5.  Patient will perform grooming task in standing for 10 minutes using RW with Supervision and Good safety awareness. 6.  Patient will improve Barthel index scores to atleast 80/100 to improve functional mobility  7. Patient will utilize energy conservation techniques during functional activities with minimal verbal cues. Initiated 9/25/2018 and to be accomplished within 2 Week(s)    1. Patient will perform Upper body ADL's with adaptive equipment with supervision/set-up. (progressing-currently SBA)  2.  Patient will perform Lower body ADL's with adaptive equipment as needed with moderate assistance. (goal met-UPG CGA)  3. Patient will perform toileting task with standby assist with Good safety to reduce falls risk. (progressing-currently CGA)  4. Patient will perform functional transfers with Rolling Walker and supervision/set-up. (progressing-currently SBA)  5. Patient will perform grooming task in standing for 5 minutes using RW with SBA and Good safety awareness. (goal met-UPG 10 mins with Supervision)  6. Patient will improve Barthel index scores to atleast 65/100 to improve functional mobility. (goal met-UPG 80/100)  7. Patient will utilize energy conservation techniques during functional activities with minimal verbal cues. (progressing)    OCCUPATIONAL THERAPY LONG TERM GOALS   Initiated 9/25/2018 and to be accomplished within 4 Week(s)    1.   Patient will perform ADL's & IADLs with adaptive equipment as needed with modified independence. 2.  Patient will perform toileting task with modified independence with Good safety to reduce falls risk. 3.  Patient will perform all functional transfers utilizing least restrictive device and durable medical equipment as needed with modified independence and Good balance and safety awareness. 4.  Patient will perform standing static/dynamic activity for improved ADL/IADL function with modified independence and Good balance and safety awareness. 5.  Patient will improve Barthel index score to 95/100 to improve independence with mobility. 6. Patient will perform home making tasks and simple meal prep Mod I utilizing energy conservation techniques and good safety awareness. Therapist: Jennyfer Lama    9/25/2018            [facility]   Occupational Therapy Daily Treatment note      Patient: Jeffrey Davila (89 y.o. female)       Date: 10/4/2018  Attending Physician: Ree Taylor MD  Primary Diagnosis: afib    Treatment Diagnosis  Treatment Diagnosis: increased need for assist with self care  Treatment Diagnosis 2: muscle weakness   Precautions : Precautions at Admission: Fall  Vital Signs:  Vital Signs  Temp: 97.8 °F (36.6 °C)  Temp Source: Oral  Pulse (Heart Rate): 71  Resp Rate: 18  O2 Sat (%): 97 %  BP: 141/54  MAP (Calculated): 83  BP 1 Method: Automatic  BP 1 Location: Right arm  BP Patient Position: At rest  Electrodes Replaced: No     Cognitive Status:  Mental Status  Neurologic State: Alert  Orientation Level: Oriented X4  Cognition: Follows commands; Appropriate for age attention/concentration  Pain:  Pain Screen  Pain Scale 1: Numeric (0 - 10)  Pain Intensity 1: 0  Patient Stated Pain Goal: 0  Pain Reassessment 1: Patient sleeping  Pain Scale 1: Numeric (0 - 10)  Gross Assessment:     Coordination:     Bed Mobility:     Transfers:  Functional Transfers  Sit to Stand: Modified independent  Stand to Sit: Modified independent     Balance:  Balance  Sitting: Intact  Sitting - Static: Good (unsupported)  Sitting - Dynamic: Good (unsupported)  Standing: With support  Standing - Static: Good  Standing - Dynamic : Fair  Therapeutic Activities:  Patient reports increased R hip pain during weight bearing today and requested lighter activities today. Discussed with patient her meeting with Rudy Roth yesterday and if she agreed to assistance with IADL tasks and home management tasks upon discharge. Patient reports she is however awaiting final decision when son comes to [facility] next week. Functional mobility utilizing RW from patient's room to therapy room in order to increase functional activity tolerance and independence during task. .   Therapeutic Exercises:  UB strengthening with 3#, 2 sets x 20 reps in order to increase functional activity tolerance and UB muscle strength needed for ADLs. Patient/Caregiver Education:    Екатерина Holder Education on see above.       ASSESSMENT:  Patient continues to demonstrate the need for skilled Occupational Therapy services to improve dynamic standing balance needed for simple home management  Progression toward goals:  [x]      Improving appropriately and progressing toward goals  []      Improving slowly and progressing toward goals  []      Not making progress toward goals and plan of care will be adjusted     Treatment session: 47 minutes    Therapist:    ANNE MARIE Mccollum,  10/4/2018

## 2018-10-04 NOTE — PROGRESS NOTES
The patient was offered a group activity of playing  a word game of [id number] on October 3, 2018. The patient declined this activity. The  will continue to offer group and 1:1 activities and encourage the patient to participate in the activities.

## 2018-10-05 PROCEDURE — 74011250637 HC RX REV CODE- 250/637: Performed by: INTERNAL MEDICINE

## 2018-10-05 RX ADMIN — ACETAMINOPHEN 650 MG: 325 TABLET, FILM COATED ORAL at 22:32

## 2018-10-05 RX ADMIN — APIXABAN 2.5 MG: 2.5 TABLET, FILM COATED ORAL at 18:12

## 2018-10-05 RX ADMIN — APIXABAN 2.5 MG: 2.5 TABLET, FILM COATED ORAL at 08:59

## 2018-10-05 RX ADMIN — METOPROLOL TARTRATE 25 MG: 25 TABLET, FILM COATED ORAL at 18:12

## 2018-10-05 RX ADMIN — DILTIAZEM HYDROCHLORIDE 180 MG: 180 CAPSULE, EXTENDED RELEASE ORAL at 08:59

## 2018-10-05 RX ADMIN — ATORVASTATIN CALCIUM 40 MG: 40 TABLET, FILM COATED ORAL at 22:32

## 2018-10-05 RX ADMIN — PANTOPRAZOLE SODIUM 40 MG: 40 TABLET, DELAYED RELEASE ORAL at 08:59

## 2018-10-05 NOTE — PROGRESS NOTES
Bedside shift change report given to BOLA Morales LPN (oncoming nurse) by ANDREAS Spangler, RN (offgoing nurse).  Report included the following information SBAR, Kardex and MAR.

## 2018-10-05 NOTE — PROGRESS NOTES
Bedside shift change report given to Kamila RN (oncoming nurse) by Adarsh Torres LPN (offgoing nurse).  Report included the following information SBAR, Kardex and MAR.

## 2018-10-05 NOTE — PROGRESS NOTES
Problem: Mobility Impaired (Adult and Pediatric)  Goal: *Acute Goals and Plan of Care (Insert Text)  PHYSICAL THERAPY STG GOALS :  Initiated 9/25/2018 and to be accomplished within 2 Weeks (updated 10/01/18)    1. Patient will move from supine to sit and sit to supine  and roll side to side in bed with supervision/set-up. (Achieved)     2. Patient will transfer from bed to chair and chair to bed with stand by assistance using RW. (Achieved)  3. Patient will perform sit to stand with stand by assistance using RW with Fair+ balance and safety awareness. (Achieved)  4. Patient will ambulate with stand by assistance for 150 feet with RW on level surfaces with 2 turns. (Achieved)  5. Patient will ascend/descend 3 stairs with right handrail(s) with contact guard assist to allow for safe home access/exit. (Achieved)  6. Patient will improve standardized test score for [place] Standing Balance Scale 3 to reduce fall risk. (Progressing; 2+)    PHYSICAL THERAPY LTG GOALS :  Initiated 9/25/2018 and to be accomplished within 4 Weeks    1. Patient will move from supine to sit and sit to supine  and roll side to side in bed with modified independence. 2.  Patient will transfer from bed to chair and chair to bed with modified independence using LRAD. 3.  Patient will perform sit to stand with modified independence with Good balance and safety awareness. 4.  Patient will ambulate with supervision/set-up for 200 feet with LRAD on level surfaces and be able to maneuver through narrow spaces and obstacles without loss of balance. 5.  Patient will ascend/descend 3 stairs with right handrail(s) with stand by assistance to allow for safe home access/exit. 6.  Patient will improve standardized test score for [place] Standing Balance Scale 4 to reduce fall risk.       Physical Therapist:   Ryan Reina, PT  on 9/25/2018             [address]  [facility]   physical Therapy Daily Treatment note      Patient: Annette Seats (89 y.o. female)               Date: 10/5/2018    Physician: Koby Martinez MD  Primary Diagnosis: afib          Treatment Diagnosis  Treatment Diagnosis: increased need for assist with self care  Treatment Diagnosis 2: muscle weakness  Precautions: Fall  Vital Signs  Vital Signs  Electrodes Replaced: No  Cognitive Status:  Mental Status  Neurologic State: Alert  Orientation Level: Oriented X4  Cognition: Follows commands; Appropriate for age attention/concentration  Pain  Pain Screen  Pain Scale 1: Visual  Pain Intensity 1: 0  Patient Stated Pain Goal: 0  Bed Mobility Training  Balance  Sitting: Intact  Sitting - Static: Good (unsupported)  Sitting - Dynamic: Good (unsupported)  Standing: With support  Standing - Static: Good  Standing - Dynamic : Fair  Transfer Training  Transfer Training  Sit to Stand: Modified independent  Stand to Sit: Modified independent  Sit to Stand: Modified independent  Gait Training  Gait  Base of Support: Center of gravity altered  Speed/Mckenna: Pace decreased (<100 feet/min)  Step Length: Right shortened;Left shortened  Gait Abnormalities: Decreased step clearance; Step to gait  Ambulation - Level of Assistance: Supervision  Distance (ft): 143 Feet (ft) (x2)  Assistive Device: Gait belt;Walker, rolling  Rail Use: Right   Stairs - Level of Assistance:  (close (S) )  Number of Stairs Trained: 5  Interventions: Verbal cues  Gait Abnormalities: Decreased step clearance; Step to gait  With 3 turns. Therapeutic Exercise: Gait training rendered, but limited due to pt's c/o of R hip discomfort with prolonged ambulation. Pt stated \"It' doesn't bother me until I put weight on it\". Pt agreeable to stair training as noted above. Seated B LE TE's rendered to promote strength and endurance for improved functional mobility: HR/TR, LAQ, hip flexion, ball squeezes, resisted hip abd (orange band) 2x20. Therapist discussed assistance at home with pt in preparation for DC.  Pt reported that she is thinking of hiring senior core a few days per week, or utilizing her LTC plan. Patient/Caregiver Education:   Pt /Caregiver Education on safety and fall prevention, HEP, therapist provided handout and reviewed with pt. Pt demonstrated ability to correctly complete all TE's, was provided to maximize functional gains. ASSESSMENT:  Patient continues to benefit from Skilled PT services to improve Gait, balance, strength, and stair negotiation. Progression toward goals:  [x]      Improving appropriately and progressing toward goals  []      Improving slowly and progressing toward goals  []      Not making progress toward goals and plan of care will be adjusted     Treatment session: 47 minutes.   Therapist:   Gabriella Neumann PTA,          10/5/2018

## 2018-10-05 NOTE — PROGRESS NOTES
Problem: Self Care Deficits Care Plan (Adult)  Goal: *Acute Goals and Plan of Care (Insert Text)  OCCUPATIONAL THERAPY SHORT TERM GOALS     Updated 10/01/18    1. Patient will perform Upper body ADL's with adaptive equipment with supervision/set-up. 2.  Patient will perform Lower body ADL's with adaptive equipment as needed with CGA. 3.  Patient will perform toileting task with standby assist with Good safety to reduce falls risk. 4.  Patient will perform functional transfers with Rolling Walker and supervision/set-up. 5.  Patient will perform grooming task in standing for 10 minutes using RW with Supervision and Good safety awareness. 6.  Patient will improve Barthel index scores to atleast 80/100 to improve functional mobility  7. Patient will utilize energy conservation techniques during functional activities with minimal verbal cues. Initiated 9/25/2018 and to be accomplished within 2 Week(s)    1. Patient will perform Upper body ADL's with adaptive equipment with supervision/set-up. (progressing-currently SBA)  2.  Patient will perform Lower body ADL's with adaptive equipment as needed with moderate assistance. (goal met-UPG CGA)  3. Patient will perform toileting task with standby assist with Good safety to reduce falls risk. (progressing-currently CGA)  4. Patient will perform functional transfers with Rolling Walker and supervision/set-up. (progressing-currently SBA)  5. Patient will perform grooming task in standing for 5 minutes using RW with SBA and Good safety awareness. (goal met-UPG 10 mins with Supervision)  6. Patient will improve Barthel index scores to atleast 65/100 to improve functional mobility. (goal met-UPG 80/100)  7. Patient will utilize energy conservation techniques during functional activities with minimal verbal cues. (progressing)    OCCUPATIONAL THERAPY LONG TERM GOALS   Initiated 9/25/2018 and to be accomplished within 4 Week(s)    1.   Patient will perform ADL's & IADLs with adaptive equipment as needed with modified independence. 2.  Patient will perform toileting task with modified independence with Good safety to reduce falls risk. 3.  Patient will perform all functional transfers utilizing least restrictive device and durable medical equipment as needed with modified independence and Good balance and safety awareness. 4.  Patient will perform standing static/dynamic activity for improved ADL/IADL function with modified independence and Good balance and safety awareness. 5.  Patient will improve Barthel index score to 95/100 to improve independence with mobility. 6. Patient will perform home making tasks and simple meal prep Mod I utilizing energy conservation techniques and good safety awareness. Therapist: Alejandro Mclaughlin    9/25/2018            [facility]   Occupational Therapy Daily Treatment note      Patient: Stephen Wray (29 y.o. female)       Date: 10/5/2018  Attending Physician: Annemarie Zhao MD  Primary Diagnosis: afib    Treatment Diagnosis  Treatment Diagnosis: increased need for assist with self care  Treatment Diagnosis 2: muscle weakness   Precautions : Precautions at Admission: Fall  Vital Signs:  Vital Signs  Pulse (Heart Rate): 61  BP: 123/66  Electrodes Replaced: No     Cognitive Status:  Mental Status  Neurologic State: Alert  Orientation Level: Oriented X4  Cognition: Follows commands; Appropriate for age attention/concentration  Pain:  Pain Screen  Pain Scale 1: Visual  Pain Intensity 1: 0  Patient Stated Pain Goal: 0  Pain Scale 1: Visual  Transfers:  Functional Transfers  Sit to Stand: Modified independent  Stand to Sit: Modified independent     Balance:  Balance  Sitting: Intact  Sitting - Static: Good (unsupported)  Sitting - Dynamic: Good (unsupported)  Standing: With support  Standing - Static: Good  Standing - Dynamic : Fair  ADL Self Care:  Basic ADL  Oral Facial Hygiene/Grooming: Modified Independent  Upper Body Dressing: Stand-by assistance (with button hook)  Therapeutic Activities:  Pt performed grooming task MOD I @ stand with RW, no LOB noted. Supervision fnxl room and hallway mobility with RW from room<-> OT gym to increase activity tolerance and endurance for ADL carryover. Pt practiced UB dressing with 1/2 and 1 inch buttons utilizing button hook with SBA for proper tech for increased (I). Patient/Caregiver Education:    Pt educated on proper usage of button hook for increased (I) with ADL tasks. ASSESSMENT:  Patient continues to demonstrate the need for skilled Occupational Therapy services to improve strength, balance, and endurance.  Progression toward goals:  [x]      Improving appropriately and progressing toward goals  []      Improving slowly and progressing toward goals  []      Not making progress toward goals and plan of care will be adjusted     Treatment session:   40 minutes    Therapist:    ANNE MARIE Corrigan,  10/5/2018

## 2018-10-06 PROCEDURE — 74011250637 HC RX REV CODE- 250/637: Performed by: INTERNAL MEDICINE

## 2018-10-06 PROCEDURE — 74011000250 HC RX REV CODE- 250: Performed by: INTERNAL MEDICINE

## 2018-10-06 RX ADMIN — PANTOPRAZOLE SODIUM 40 MG: 40 TABLET, DELAYED RELEASE ORAL at 09:05

## 2018-10-06 RX ADMIN — ATORVASTATIN CALCIUM 40 MG: 40 TABLET, FILM COATED ORAL at 21:23

## 2018-10-06 RX ADMIN — APIXABAN 2.5 MG: 2.5 TABLET, FILM COATED ORAL at 17:48

## 2018-10-06 RX ADMIN — METOPROLOL TARTRATE 25 MG: 25 TABLET, FILM COATED ORAL at 17:48

## 2018-10-06 RX ADMIN — METOPROLOL TARTRATE 25 MG: 25 TABLET, FILM COATED ORAL at 09:05

## 2018-10-06 RX ADMIN — DILTIAZEM HYDROCHLORIDE 180 MG: 180 CAPSULE, EXTENDED RELEASE ORAL at 09:05

## 2018-10-06 RX ADMIN — APIXABAN 2.5 MG: 2.5 TABLET, FILM COATED ORAL at 09:05

## 2018-10-06 NOTE — PROGRESS NOTES
Problem: Mobility Impaired (Adult and Pediatric)  Goal: *Acute Goals and Plan of Care (Insert Text)  PHYSICAL THERAPY STG GOALS :  Initiated 9/25/2018 and to be accomplished within 2 Weeks (updated 10/01/18)    1. Patient will move from supine to sit and sit to supine  and roll side to side in bed with supervision/set-up. (Achieved)     2. Patient will transfer from bed to chair and chair to bed with stand by assistance using RW. (Achieved)  3. Patient will perform sit to stand with stand by assistance using RW with Fair+ balance and safety awareness. (Achieved)  4. Patient will ambulate with stand by assistance for 150 feet with RW on level surfaces with 2 turns. (Achieved)  5. Patient will ascend/descend 3 stairs with right handrail(s) with contact guard assist to allow for safe home access/exit. (Achieved)  6. Patient will improve standardized test score for [place] Standing Balance Scale 3 to reduce fall risk. (Progressing; 2+)    PHYSICAL THERAPY LTG GOALS :  Initiated 9/25/2018 and to be accomplished within 4 Weeks    1. Patient will move from supine to sit and sit to supine  and roll side to side in bed with modified independence. 2.  Patient will transfer from bed to chair and chair to bed with modified independence using LRAD. 3.  Patient will perform sit to stand with modified independence with Good balance and safety awareness. 4.  Patient will ambulate with supervision/set-up for 200 feet with LRAD on level surfaces and be able to maneuver through narrow spaces and obstacles without loss of balance. 5.  Patient will ascend/descend 3 stairs with right handrail(s) with stand by assistance to allow for safe home access/exit. 6.  Patient will improve standardized test score for [place] Standing Balance Scale 4 to reduce fall risk.       Physical Therapist:   Man Banks, PT  on 9/25/2018             [address]  [facility]   physical Therapy Daily Treatment note      Patient: Rocio Emanuel (89 y.o. female)               Date: 10/6/2018    Physician: Jordy Villalobos MD  Primary Diagnosis: afib          Treatment Diagnosis  Treatment Diagnosis: increased need for assist with self care  Treatment Diagnosis 2: muscle weakness  Precautions: Fall  Vital Signs  Vital Signs  Temp: 97.7 °F (36.5 °C)  Temp Source: Oral  Pulse (Heart Rate): 73  Heart Rate Source: Monitor  Resp Rate: 16  BP: 161/71  MAP (Calculated): 101     Cognitive Status:     Pain  Pain Screen  Pain Scale 1: Numeric (0 - 10)  Pain Intensity 1: 0  Pain Location 1: Shoulder  Pain Orientation 1: Right  Pain Description 1: Aching  Pain Intervention(s) 1: Medication (see MAR)  Patient Stated Pain Goal: 0  Pain Reassessment 1: Patient sleeping  Bed Mobility Training   See narrative  Balance   See narrative  Transfer Training   See narrative     Gait Training   See narrative              Therapeutic Exercise:   Pt found lying supine in bed upon arrival and is agreeable to skilled PT services. Patient performed supine to sit with (S) + increased time and effort to carryout due to weakness. Performed sit to stand transfers from various heights and surfaces (S)-- for safety only. Performed gait training x 125 ft with FWW and S -- presented with notable LLD R leg shorter than left-- decreased gait speed, NBOS and absent heel strike to R; participated in seated therex to include heel raises, toe raises, hip add isometrics, LAQs c 3 sec ct and hip abd/add routine with LEs extended; standing heel raises and hip flexion routine 1x20 ea-- to increase strength, ROM and tolerance to activity in prep for improved levels of functional mobility.  Performed balance retraining activities with focus on changing MURPHY (NBOS, modified tandem stance and normal stance) c UE activity-- signs of instability observed (fair+), requiring UE support to regain stability-- cuing to promote core/gluteal engagement for enhanced balance-- inconsistent carryover noted this visit. Patient/Caregiver Education:   Pt /Caregiver Education on safety and fall prevention and importance of proper hand placement/positioning for sit to stand transfers was provided to enhance overall functional mobility and safety. ASSESSMENT:  Patient continues to benefit from Skilled PT services to improve strength, ROM and tolerance to activity   Progression toward goals:  [x]      Improving appropriately and progressing toward goals  []      Improving slowly and progressing toward goals  []      Not making progress toward goals and plan of care will be adjusted     Treatment session: 46 minutes. Therapist:   [place].  Sarah Barrow PTA,          10/6/2018

## 2018-10-06 NOTE — ROUTINE PROCESS
Bedside and Verbal shift change report given to Alex Mcdaniels RN (oncoming nurse) by Samia Lindsay RN (offgoing nurse). Report included the following information SBAR, Kardex and MAR.

## 2018-10-06 NOTE — PROGRESS NOTES
Bedside shift change report given to Dione Bhatia (oncoming nurse) by Mikhail Nelson LPN (offgoing nurse).  Report included the following information SBAR, Kardex and MAR.

## 2018-10-06 NOTE — PROGRESS NOTES
Bedside shift change report given to TIFFANY Beckford, RN (oncoming nurse) by Divina Grady. Mitchell Traylor RN (offgoing nurse). Report included the following information SBAR, Kardex and MAR.

## 2018-10-07 PROCEDURE — 74011250637 HC RX REV CODE- 250/637: Performed by: INTERNAL MEDICINE

## 2018-10-07 RX ADMIN — ATORVASTATIN CALCIUM 40 MG: 40 TABLET, FILM COATED ORAL at 21:32

## 2018-10-07 RX ADMIN — DILTIAZEM HYDROCHLORIDE 180 MG: 180 CAPSULE, EXTENDED RELEASE ORAL at 08:55

## 2018-10-07 RX ADMIN — APIXABAN 2.5 MG: 2.5 TABLET, FILM COATED ORAL at 08:55

## 2018-10-07 RX ADMIN — METOPROLOL TARTRATE 25 MG: 25 TABLET, FILM COATED ORAL at 17:43

## 2018-10-07 RX ADMIN — PANTOPRAZOLE SODIUM 40 MG: 40 TABLET, DELAYED RELEASE ORAL at 08:55

## 2018-10-07 RX ADMIN — METOPROLOL TARTRATE 25 MG: 25 TABLET, FILM COATED ORAL at 08:55

## 2018-10-07 RX ADMIN — APIXABAN 2.5 MG: 2.5 TABLET, FILM COATED ORAL at 17:43

## 2018-10-07 NOTE — ROUTINE PROCESS
Bedside and Verbal shift change report given to Herson Kaufman LPN (oncoming nurse) by Elda Levy RN (offgoing nurse). Report included the following information SBAR, Kardex and MAR.

## 2018-10-07 NOTE — ROUTINE PROCESS
Bedside and Verbal shift change report given to Ramon Srinivasan RN (oncoming nurse) by April Martin LPN (offgoing nurse). Report included the following information SBAR, Kardex and MAR.

## 2018-10-08 PROCEDURE — 74011000250 HC RX REV CODE- 250: Performed by: INTERNAL MEDICINE

## 2018-10-08 PROCEDURE — 74011250637 HC RX REV CODE- 250/637: Performed by: INTERNAL MEDICINE

## 2018-10-08 RX ADMIN — APIXABAN 2.5 MG: 2.5 TABLET, FILM COATED ORAL at 09:05

## 2018-10-08 RX ADMIN — APIXABAN 2.5 MG: 2.5 TABLET, FILM COATED ORAL at 17:27

## 2018-10-08 RX ADMIN — ATORVASTATIN CALCIUM 40 MG: 40 TABLET, FILM COATED ORAL at 21:58

## 2018-10-08 RX ADMIN — METOPROLOL TARTRATE 25 MG: 25 TABLET, FILM COATED ORAL at 17:27

## 2018-10-08 RX ADMIN — ERGOCALCIFEROL 50000 UNITS: 1.25 CAPSULE ORAL at 17:27

## 2018-10-08 RX ADMIN — METOPROLOL TARTRATE 25 MG: 25 TABLET, FILM COATED ORAL at 09:00

## 2018-10-08 RX ADMIN — PANTOPRAZOLE SODIUM 40 MG: 40 TABLET, DELAYED RELEASE ORAL at 09:05

## 2018-10-08 RX ADMIN — DILTIAZEM HYDROCHLORIDE 180 MG: 180 CAPSULE, EXTENDED RELEASE ORAL at 09:05

## 2018-10-08 NOTE — PROGRESS NOTES
Problem: Mobility Impaired (Adult and Pediatric)  Goal: *Acute Goals and Plan of Care (Insert Text)  PHYSICAL THERAPY LTG GOALS :  Initiated 9/25/2018 and to be accomplished within 4 Weeks (Updated 10/8/18)    1. Patient will move from supine to sit and sit to supine  and roll side to side in bed with modified independence. (Achieved)     2. Patient will transfer from bed to chair and chair to bed with modified independence using LRAD. (Achieved with RW)   3.  Patient will perform sit to stand with modified independence with Good balance and safety awareness. (Achieved)   4. Patient will ambulate with supervision/set-up for 200 feet with LRAD on level surfaces and be able to maneuver through narrow spaces and obstacles without loss of balance. (Progressing; 154ft with RW)  5.  Patient will ascend/descend 3 stairs with right handrail(s) with stand by assistance to allow for safe home access/exit. (Achieved)   6. Patient will improve standardized test score for [place] Standing Balance Scale 4 to reduce fall risk. (Progressing; 3+)     PHYSICAL THERAPY STG GOALS :  Initiated 9/25/2018 and to be accomplished within 2 Weeks (updated 10/01/18)    1. Patient will move from supine to sit and sit to supine  and roll side to side in bed with supervision/set-up. (Achieved)     2. Patient will transfer from bed to chair and chair to bed with stand by assistance using RW. (Achieved)  3. Patient will perform sit to stand with stand by assistance using RW with Fair+ balance and safety awareness. (Achieved)  4. Patient will ambulate with stand by assistance for 150 feet with RW on level surfaces with 2 turns. (Achieved)  5. Patient will ascend/descend 3 stairs with right handrail(s) with contact guard assist to allow for safe home access/exit. (Achieved)  6. Patient will improve standardized test score for [place] Standing Balance Scale 3 to reduce fall risk.    (Progressing; 2+)    PHYSICAL THERAPY LTG GOALS :  Initiated 9/25/2018 and to be accomplished within 4 Weeks    1. Patient will move from supine to sit and sit to supine  and roll side to side in bed with modified independence. 2.  Patient will transfer from bed to chair and chair to bed with modified independence using LRAD. 3.  Patient will perform sit to stand with modified independence with Good balance and safety awareness. 4.  Patient will ambulate with supervision/set-up for 200 feet with LRAD on level surfaces and be able to maneuver through narrow spaces and obstacles without loss of balance. 5.  Patient will ascend/descend 3 stairs with right handrail(s) with stand by assistance to allow for safe home access/exit. 6.  Patient will improve standardized test score for [place] Standing Balance Scale 4 to reduce fall risk. Physical Therapist:   Shay Abbott PT  on 9/25/2018              [facility]   PHYSICAL THERAPY WEEKLY PROGRESS REPORT  Reporting Period:  Date: 10/1/18 to 10/8/18      Patient: Charis Soares (89 y.o. female)                         Date: 10/8/2018    Primary Diagnosis: afib      Attending Physician: Ranjan Burton MD   Treatment Diagnosis  Treatment Diagnosis: increased need for assist with self care  Treatment Diagnosis 2: muscle weakness  Precautions:  Fall  Rehab Potential : Good:    Skill interventions and education provided with clinical rationale (include individualized treatment techniques and standardized tests):   Skilled Physical Therapy services were provided with  TA; pt is now Mod (I) for bed mobility, sit<>stand, and transfers utilizing RW. TE to build strength and endurance for improved functional mobility  Gait training to address deficits and allow pt to return to PLOF  Stair training to allow pt to safely return upon DC  Neuromuscular reeducation of coordination, and balance for reduced risk of falls.        Using a comparative statement, summarize significant progress toward goals as a result of skilled intervention provided:  Patient has made Good progress towards their Physical Therapy goals in the areas of bed mobility, sit<>stand, transfers, gait and stair negotiation. Identify remaining functional areas, impairments limiting progress and/or barriers to improvement:  Patient would benefit from continues PT services to address the following functional deficits in dynamic balance and quality of ambulation and stair negotiation.      OBJECTIVE DATA SUMMARY:     INITIAL ASSESSMENT WEEKLY ASSESSMENT   COGNITIVE STATUS COGNITIVE STATUS   Neurologic State: Alert  Orientation Level: Oriented X4  Cognition: Appropriate decision making, Appropriate for age attention/concentration, Appropriate safety awareness, Follows commands  Perception: Appears intact  Perseveration: No perseveration noted  Safety/Judgement: Fall prevention Neurologic State: Alert  Orientation Level: Oriented X4  Cognition: Appropriate for age attention/concentration  Perception: Appears intact  Perseveration: No perseveration noted  Safety/Judgement: Fall prevention   PAIN PAIN   Pain Scale 1: Numeric (0 - 10)  Pain Intensity 1: 0  Pain Location 1: Shoulder  Pain Orientation 1: Right  Pain Description 1: Aching  Pain Intervention(s) 1: Distraction, Food, Repositioned  Patient Stated Pain Goal: 0  Pain Reassessment 1: Patient sleeping Pain Scale 1: Visual  Pain Intensity 1: 0  Pain Location 1: Shoulder  Pain Orientation 1: Right  Pain Description 1: Aching  Pain Intervention(s) 1: Medication (see MAR)  Patient Stated Pain Goal: 0  Pain Reassessment 1: Patient sleeping   GROSS ASSESSMENT GROSS ASSESSMENT   AROM: Generally decreased, functional  PROM: Generally decreased, functional (LUE shoulder flexion 100 degrees)  Strength: Generally decreased, functional (R hip 3-/5, R knee 4+5; LLE grossly 4+/5)  Coordination: Generally decreased, functional  Tone: Normal  Sensation: Intact (BUEs) AROM: Generally decreased, functional (LUE shoulder flexion 90 degrees, RUE WFL)  PROM: Generally decreased, functional (LUE shoulder flexion 100 degrees)  Strength: Generally decreased, functional (BUEs 3+/5)  Coordination: Generally decreased, functional (BUEs)  Tone: Normal (BUEs)  Sensation: Intact (BUEs)   BED MOBILITY BED MOBILITY   Rolling:  (not assessed)  Supine to Sit: Minimum assistance (for upper body )  Scooting: Stand-by assistance Rolling: Modified independent  Supine to Sit: Modified independent  Scooting: Stand-by assistance   GAIT GAIT   Base of Support: Center of gravity altered  Speed/Mckenna: Pace decreased (<100 feet/min)  Step Length: Left shortened, Right shortened  Gait Abnormalities: Decreased step clearance, Toe walking  Ambulation - Level of Assistance: Contact guard assistance  Distance (ft): 120 Feet (ft)  Assistive Device: Gait belt, Walker, rolling  Rail Use: Both  Stairs - Level of Assistance: Contact guard assistance  Number of Stairs Trained: 5  Interventions: Safety awareness training, Verbal cues Base of Support: Center of gravity altered  Speed/Mckenna: Pace decreased (<100 feet/min)  Step Length: Right shortened, Left shortened  Gait Abnormalities: Decreased step clearance  Ambulation - Level of Assistance: Supervision  Distance (ft): 158 Feet (ft) (x2)  Assistive Device: Gait belt, Walker, rolling  Rail Use: Right   Stairs - Level of Assistance:  (close (S) )  Number of Stairs Trained: 5  Interventions: Verbal cues   Exceptions to WDL Exceptions to WDL               TRANSFERS TRANSFERS   Sit to Stand: Contact guard assistance  Stand to Sit: Contact guard assistance Sit to Stand: Modified independent  Stand to Sit: Modified independent   BALANCE BALANCE   Sitting: Without support  Sitting - Static: Good (unsupported)  Sitting - Dynamic: Fair (occasional) (+)  Standing: With support  Standing - Static: Fair  Standing - Dynamic : Fair Sitting: Intact  Sitting - Static: Good (unsupported)  Sitting - Dynamic: Good (unsupported)  Standing: With support  Standing - Static: Good  Standing - Dynamic : Fair   WHEELCHAIR MOBILITY/MGMT WHEELCHAIR MOBILITY/MGMT         Activity Tolerance:  Good Activity Tolerance: Good   Visual/Perceptual   Corrective Lenses: Contacts      Visual/Perceptual   Vision  Corrective Lenses: Contacts        Auditory:   Auditory Impairment: None    Auditory:   Auditory  Auditory Impairment: None       Steps: right   Clinical Decision making:  [place] standing balance score: 3+ Clinical Decision making:  [place] standing balance score: 3+     Treatment:   Pt is now at Mod (I) for bed mobility, sit<>stand, transfers and gait of short distances with RW. Pt is at (S) with RW for prolonged ambulation. Gait training rendered x158ft with (S) and no noted LOB, pt continues to toe walk on R LE due to report of leg length discrepancy, and pt appears to have adapted well to this gait deviation. Pt negotiated 5 steps with B UE support at R HR with close (S) and intermittent verbal cues for sequencing. Seated B LE TE's rendered to included; LAQ, HR/TR, and hip isometrics. Standing HR with B UE support at RW and SBA. Pt with no LOB with standing TE's, but reported feeling dizzy following standing TE's. BP assessed at 184/75. Following seated rest break of ~5' BP:171/78. Pt transported back to pt's room via w/c. Therapist notified NSG. NSG present with pt at end of session. Patient's response to treatment rendered:  Fair ((+))    Patient expected Discharge Location:  [x]Private Residence  [] [facility]  []LTC  []Other:    Plan: Continue Skilled PT services as established by the Plan of Care for 3-6 times a week. PT and Assistant have had a weekly case conference regarding the above treatment:  [] Yes     [] No       Treatment session:  47 minutes. Therapist: Lala Favre, PTA       Date:10/8/2018  Forward to PT for co-signature when completed.

## 2018-10-08 NOTE — PROGRESS NOTES
The patient was offered a group activity to activity to play Sorry and Dominos on October 5, 2018. The patient fully participated in the activity. While attending the group the patient was alert and oriented. The patient engaged in conversation with others. The  will continue to offer group and 1:1 activities to the patient and encourage continued participation in the activities.

## 2018-10-08 NOTE — PROGRESS NOTES
The patient was offered a group activity to discuss the morning news on October 4, 2018. The patient fully participated in the activity. While attending the group the patient was alert and oriented. The patient engaged in conversation with others. The  will continue to offer group and 1:1 activities to the patient and encourage continued participation in the activities.

## 2018-10-08 NOTE — ROUTINE PROCESS
Bedside and Verbal shift change report given to [facility] Mariana (oncoming nurse) by Nilda West RN (offgoing nurse). Report included the following information SBAR, Kardex and MAR. Qh visual checks and 24h chart checks done.

## 2018-10-08 NOTE — PROGRESS NOTES
The patient was offered 1:1 activities with the  on October 5, 2018. The patient engaged in conversation with the . The  will continue to offer group and 1:1 activities and encourage the patient to participate in the activities.

## 2018-10-08 NOTE — PROGRESS NOTES
The patient was offered a group activity of playing [id number] in the dining room on October 4, 2018. The patient fully participated in the activity. While attending the group the patient was alert and oriented. The patient engaged in conversation with others. The  will continue to offer group and 1:1 activities to the patient and encourage continued participation in the activities.

## 2018-10-08 NOTE — PROGRESS NOTES
The patient was offered 1:1 activities with the  on October 5, 2018. The patient declined any activities with the . The  will continue to offer group and 1:1 activities and encourage the patient to participate in the activities.

## 2018-10-08 NOTE — PROGRESS NOTES
The patient was offered a group activity listen to a guitarist play music on October 8, 2018. The patient declined this activity. The  will continue to offer group and 1:1 activities and encourage the patient to participate in the activities.

## 2018-10-08 NOTE — ROUTINE PROCESS
Bedside and Verbal shift change report given to Rocio LUIS (oncoming nurse) by Valarie Mccormick RN (offgoing nurse). Report included the following information SBAR, Kardex and MAR.

## 2018-10-08 NOTE — PROGRESS NOTES
Problem: Self Care Deficits Care Plan (Adult)  Goal: *Acute Goals and Plan of Care (Insert Text)  OCCUPATIONAL THERAPY SHORT TERM GOALS     1. Patient will perform Upper body ADL's with adaptive equipment with supervision/set-up. 2.  Patient will perform Lower body ADL's with adaptive equipment as needed with CGA. 3.  Patient will perform functional transfers with Rolling Walker and supervision/set-up. 4.  Patient will perform grooming task in standing for 10 minutes using RW with Supervision and Good safety awareness. 5.  Patient will improve Barthel index scores to atleast 80/100 to improve functional mobility  6. Patient will utilize energy conservation techniques during functional activities with minimal verbal cues. Updated 10/8/18    1. Patient will perform Upper body ADL's with adaptive equipment with supervision/set-up. -CLOF: dressing w/ SBA using button hook, bathing w/ assist provided by CNA per pt report  2. Patient will perform Lower body ADL's with adaptive equipment as needed with CGA.-CLOF: dressing w/ Min A, bathing w/ assist provided by CNA per pt report  3. Patient will perform toileting task with standby assist with Good safety to reduce falls risk. -CLOF: Mod I, met goal-d/c goal  4. Patient will perform functional transfers with Rolling Walker and supervision/set-up. -CLOF: Mod I w/ RW for toilet transfer, SBA with tub-shower unit using tub transfer bench and grab bars  5. Patient will perform grooming task in standing for 10 minutes using RW with Supervision and Good safety awareness. -CLOF: 6 mins, good static, fair to fair+ dynamic w/ RW and Supv-SBA  6. Patient will improve Barthel index scores to atleast 80/100 to improve functional mobility. -CLOF: 75/100  7. Patient will utilize energy conservation techniques during functional activities with minimal verbal cues.  -CLOF: instruction provided and handout issued to increase accuracy with carryover      Updated 10/01/18    1. Patient will perform Upper body ADL's with adaptive equipment with supervision/set-up. 2.  Patient will perform Lower body ADL's with adaptive equipment as needed with CGA. 3.  Patient will perform toileting task with standby assist with Good safety to reduce falls risk. 4.  Patient will perform functional transfers with Rolling Walker and supervision/set-up. 5.  Patient will perform grooming task in standing for 10 minutes using RW with Supervision and Good safety awareness. 6.  Patient will improve Barthel index scores to atleast 80/100 to improve functional mobility  7. Patient will utilize energy conservation techniques during functional activities with minimal verbal cues. Initiated 9/25/2018 and to be accomplished within 2 Week(s)    1. Patient will perform Upper body ADL's with adaptive equipment with supervision/set-up. (progressing-currently SBA)  2.  Patient will perform Lower body ADL's with adaptive equipment as needed with moderate assistance. (goal met-[facility] CGA)  3. Patient will perform toileting task with standby assist with Good safety to reduce falls risk. (progressing-currently CGA)  4. Patient will perform functional transfers with Rolling Walker and supervision/set-up. (progressing-currently SBA)  5. Patient will perform grooming task in standing for 5 minutes using RW with SBA and Good safety awareness. (goal met-UPG 10 mins with Supervision)  6. Patient will improve Barthel index scores to atleast 65/100 to improve functional mobility. (goal met-UPG 80/100)  7. Patient will utilize energy conservation techniques during functional activities with minimal verbal cues. (progressing)    OCCUPATIONAL THERAPY LONG TERM GOALS   Initiated 9/25/2018 and to be accomplished within 4 Week(s)    1. Patient will perform ADL's & IADLs with adaptive equipment as needed with modified independence.   2.  Patient will perform toileting task with modified independence with Good safety to reduce falls risk. 3.  Patient will perform all functional transfers utilizing least restrictive device and durable medical equipment as needed with modified independence and Good balance and safety awareness. 4.  Patient will perform standing static/dynamic activity for improved ADL/IADL function with modified independence and Good balance and safety awareness. 5.  Patient will improve Barthel index score to 95/100 to improve independence with mobility. 6. Patient will perform home making tasks and simple meal prep Mod I utilizing energy conservation techniques and good safety awareness. Therapist: Cass Beavers    9/25/2018               [facility]  OCCUPATIONAL THERAPY WEEKLY SUMMARY   Reporting period:  from 10/ /18 through 10/8/18       Patient: Kim Tanner (89 y.o. female)   Date: 10/8/2018    Primary Diagnosis: afib       Attending Physician: Violet Lopez MD Treatment Diagnosis  Treatment Diagnosis: increased need for assist with self care  Treatment Diagnosis 2: muscle weakness  Precautions: Fall  Rehab Potential : Good    Skill interventions and education provided with clinical rationale (include individualized treatment techniques and standardized tests):  Skilled Occupational services were provided utilizing ADL retraining, instruction on adaptive equipment training, safety awareness and energy conservation techniques incorporating balance retraining & UE ROM techniques, therapeutic exercise and activities incorporating strengthening in order to improve ADL performance skills and functional mobility. Patient has made excellent progress, continue OT skilled services in order for patient to reach maximal functional potential towards goals for safe d/c home.     Using a comparative statement, summarize significant progress toward goals as a result of skilled intervention provided:  Patient has made Good progress towards their Occupational Therapy goals in the following areas: grooming, bathing, upper body dressing, lower body dressing, toileting, toilet transfer, tub transfer and simple home management using Buttonhook, RW, tub transfer bench, grab bars, w/c  Identify remaining functional areas, impairments limiting progress and/or barriers to improvement:  Patient would benefit from continued skilled Occupational Therapy Services to address the following functional deficits in balance, safety awareness, strength, coordination, ROM and functional activity tolerance in order to improve ADL performance skills and functional transfers for self-care tasks.         OBJECTIVE DATA SUMMARY:       INITIAL ASSESSMENT WEEKLY PROGRESS   COGNITIVE STATUS: COGNITIVE STATUS:   Neurologic State: Alert  Orientation Level: Oriented X4  Cognition: Appropriate decision making, Appropriate for age attention/concentration, Appropriate safety awareness, Follows commands  Perception: Appears intact  Perseveration: No perseveration noted  Safety/Judgement: Fall prevention Neurologic State: Appropriate for age, Alert  Orientation Level: Oriented X4  Cognition: Follows commands, Appropriate for age attention/concentration  Perception: Appears intact  Perseveration: No perseveration noted  Safety/Judgement: Fall prevention   PAIN: PAIN:   Pain Scale 1: Numeric (0 - 10)  Pain Intensity 1: 0  Pain Location 1: Shoulder  Pain Orientation 1: Right  Pain Description 1: Aching  Pain Intervention(s) 1: Distraction, Food, Repositioned  Patient Stated Pain Goal: 0  Pain Reassessment 1: Patient sleeping     Pain Scale 1: Visual  Pain Intensity 1: 0  Pain Location 1: Shoulder  Pain Orientation 1: Right  Pain Description 1: Aching  Pain Intervention(s) 1: Medication (see MAR)  Patient Stated Pain Goal: 0  Pain Reassessment 1: Patient sleeping   BED MOBILITY BED MOBILITY   Rolling:  (not assessed)  Supine to Sit: Minimum assistance (for upper body )  Scooting: Stand-by assistance Rolling: Modified independent  Supine to Sit: Modified independent  Scooting: Stand-by assistance   ADL SELF CARE ADL SELF CARE   Oral Facial Hygiene/Grooming: Modified Independent  Type of Bath: Basin/Soap/Water  Upper Body Dressing: Stand-by assistance (with button hook) Bathing Assistance: Contact guard assistance    Dressing Assistance: Contact guard assistance    Bathing Assistance: Maximum assistance    Toileting Assistance: Contact guard assistance  Bladder Hygiene: Contact guard assistance  Bowel Hygiene: Contact guard assistance  Clothing Management: Contact guard assistance    Grooming Assistance: Modified independent (standing, sinkside)  Washing Face: Modified independent  Brushing Teeth: Contact guard assistance (in stance w/ RW)    Dressing Assistance: Minimum assistance  Pants With Elastic Waist: Minimum assistance  Leg Crossed Method Used: No  Position Performed: Bending forward method, Seated edge of bed    Feeding Assistance: Modified independent   TRANSFERS TRANSFERS   Sit to Stand: Contact guard assistance  Stand to Sit: Contact guard assistance  Bathroom Mobility: Modified independent  Toilet Transfer : Contact guard assistance (w/ RW & 3 in 1 commode over toilet)  Tub Transfer: Stand-by assistance (using tub transfer bench and grab bars) Sit to Stand: Modified independent  Stand to Sit: Modified independent  Bathroom Mobility: Modified independent  Toilet Transfer : Modified independent  Tub Transfer: Stand-by assistance (using tub transfer bench and grab bars)   Bathroom Mobility: Modified independent  Toilet Transfer : Contact guard assistance (w/ RW & 3 in 1 commode over toilet)  Tub Transfer: Stand-by assistance (using tub transfer bench and grab bars) Bathroom Mobility: Modified independent  Toilet Transfer : Modified independent  Tub Transfer: Stand-by assistance (using tub transfer bench and grab bars)   BALANCE BALANCE   Sitting: Without support  Sitting - Static: Good (unsupported)  Sitting - Dynamic: Fair (occasional) (+)  Standing: With support  Standing - Static: Fair  Standing - Dynamic : Fair Sitting: Intact  Sitting - Static: Good (unsupported)  Sitting - Dynamic: Good (unsupported)  Standing: With support  Standing - Static: Good  Standing - Dynamic : Fair       GROSS ASSESSMENT  GROSS ASSESSMENT   AROM: Generally decreased, functional  PROM: Generally decreased, functional (LUE shoulder flexion 100 degrees)  Strength: Generally decreased, functional (R hip 3-/5, R knee 4+5; LLE grossly 4+/5)  Coordination: Generally decreased, functional  Tone: Normal  Sensation: Intact (BUEs) AROM: Generally decreased, functional (LUE shoulder flexion 90 degrees, RUE WFL)  PROM: Generally decreased, functional (LUE shoulder flexion 100 degrees)  Strength: Generally decreased, functional (BUEs 3+/5)  Coordination: Generally decreased, functional (BUEs)  Tone: Normal (BUEs)  Sensation: Intact (BUEs)   COORDINATION COORDINATION   Fine Motor Skills-Upper: Left Intact, Right Intact  Gross Motor Skills-Upper: Left Intact, Right Intact Fine Motor Skills-Upper: Left Intact, Right Intact  Gross Motor Skills-Upper: Left Intact, Right Intact   VISUAL/PERCEPTUAL VISUAL/PERCEPTUAL   Corrective Lenses: Contacts   Corrective Lenses: Contacts     AUDITORY: AUDITORY:   Auditory Impairment: None Auditory Impairment: None       INSTRUMENTAL  ADL'S:    INSTRUMENTAL ADL'S:          THE BARTHEL INDEX  ACTIVITY   SCORE   FEEDING  0=unable  5=needs help cutting,spreading butter,etc., or modified diet  10= independent   10   BATHING  0=dependent  5=independent (or in shower   0   GROOMING  0=needs help  5=independent face/hair/teeth/shaving (implements provided)   5   DRESSING  0=dependent  5=needs help but can do about half unaided  10=independent(including buttons, zips,laces etc.)   5   BOWELS  0=incontinent  5=occasional accident  10=continent   10   BLADDER  0=incontinent, or catheterized and unable to manage alone  5=occasional accident  10=continent   10   TOILET USE  0=dependent  5=needs some help, but can do something alone  10=independent (on and off, dressing, wiping)   10   TRANSFER (BED TO CHAIR AND BACK)  0=unable, no sitting balance  5=major help(one or two people,physical), can sit  10=minor help(verbal or physical)  15=independent   10   MOBILITY (ON LEVEL SURFACES)  0=immobile or <50 yards  5=wheelchair independent,including corners,>50 yards  10=walkes with help of one person (verbal or physical) >50 yards  15=independent(but may use any aid; for example, stick) >50 yards   10   STAIRS  0=unable  5=needs help (verbal, physical, carrying aid)  10=independent   5            TOTAL:                  75/100     Treatment:  Instructed on energy conservation techniques and issued visual handout for continued review for increased carryover for safe discharge home. Patient Supv w/ RW from patient' room to tub/shower init w/ DME > 150' w/o LOB. Patient demonstrated difficulty with step in /out of buthtub-shower unit as performed at [facility] w/ patient stating she used tub rails, patient provided instructon and required SBA with use of tub transfer bench w/ grab bars, patient stated I think I shoulder get one of these. Therapist recommended patient wait for OT [facility] services to assess tub/shower transfer utilizing tub transfer bench and shower tasks in order to make appropriate recommendations and/or modifications as needed e.g. installation of grab bars, patient verbalized understanding. Visual aide issued for tub transfer bench, grab bar and instructions on technique for tub/shower transfer using tub transfer bench and grab bar provided in order to increase accuracy with carryover with therapist recommendation to install grab bars, patient states she has a long handle shower head on hose to grade bathing easier.  Simulated medicine management utilizing weekly pill organizer x 5 meds w/ SBA w/ patient appearing frustrated & perseverating that simulated meds not the same dosage and time of day taken and had difficulty performing, patient reports at [facility] that she utilized weekly pill organizer box w/ therapist recommending w/ patient verbalized understanding to allow OT [facility] services to provide assist for safety with med mgmt once discharged home. Patient issued handout/visual aide of AE: button hook for patient to share with son for purchase w/ handout for contact info provided for DME provided. Patient report of not feeling well and speculated BP elevated, Vitals: /62, HR 62, O2 @ RA 98%. Patient's response to Treatment rendered:  Patient participated in review of O.T. plan of care and progress towards goals w/ completion of weekly progress note for client centered approach. Patient expected Discharge Location:  [x]Private Residence  [] halfway/ILF  []LTC  []Other:    Plan: Continue OT services as established on the Plan of Care for 3-6 times a week.     Treatment Minutes:  54  OT and Assistant have had a weekly case conference regarding the above treatment:  [x] Yes     [] No    Therapist:   Mague Tan,         Date:10/8/2018     Forward to OT for co-signature when completed

## 2018-10-09 PROCEDURE — 74011000250 HC RX REV CODE- 250: Performed by: INTERNAL MEDICINE

## 2018-10-09 PROCEDURE — 74011250637 HC RX REV CODE- 250/637: Performed by: INTERNAL MEDICINE

## 2018-10-09 RX ADMIN — PANTOPRAZOLE SODIUM 40 MG: 40 TABLET, DELAYED RELEASE ORAL at 08:45

## 2018-10-09 RX ADMIN — APIXABAN 2.5 MG: 2.5 TABLET, FILM COATED ORAL at 08:45

## 2018-10-09 RX ADMIN — APIXABAN 2.5 MG: 2.5 TABLET, FILM COATED ORAL at 18:00

## 2018-10-09 RX ADMIN — METOPROLOL TARTRATE 25 MG: 25 TABLET, FILM COATED ORAL at 18:00

## 2018-10-09 RX ADMIN — ATORVASTATIN CALCIUM 40 MG: 40 TABLET, FILM COATED ORAL at 21:19

## 2018-10-09 RX ADMIN — DILTIAZEM HYDROCHLORIDE 180 MG: 180 CAPSULE, EXTENDED RELEASE ORAL at 08:45

## 2018-10-09 RX ADMIN — METOPROLOL TARTRATE 25 MG: 25 TABLET, FILM COATED ORAL at 08:45

## 2018-10-09 NOTE — PROGRESS NOTES
Nutrition follow-up/Plan of care tcc     RECOMMENDATIONS:     1. Cardiac Diet  2. Monitor weight, labs and PO intake  3. RD to follow     GOALS:     1. Met/Ongoing: PO intake meets >75% of protein/calorie needs by 10/16  2. Met/Ongoing: Weight Maintenance (+/- 1-2 lb) by 10/16     ASSESSMENT:     Weight status is classified as normal per BMI of 22. However, patient is at nutrition risk due to BMI below 23 with patient above 72years of age. PO intake is adequate. No new labs. Nutrition recommendations listed. RD to follow. Nutrition Risk:  [] High  [] Moderate [x]  Low    SUBJECTIVE/OBJECTIVE:      (9/21): Pt admitted with atrial fibrillation. Patient reports having a good appetite and eating all of meals. Observed 100% intake of lunch meal during visit. States weight of 145 lb back in 1992 but weight has recently been stable at 125 lb for several years. Denies food allergies and problems with chewing/swallowing. Will monitor.    (9/25): Pt seen in room OOB in chair at dinner. Reports having a good appetite and consuming more food here than she does at home. No issues to address at this time. Will monitor.     (10/2): Pt seen in room OOB in chair at lunch; observed 100% of meal consumed. Reports she is doing well  And nothing to address at this time. Weight is stable. Will monitor. (10/9): Patient continues with a good appetite and is eating all of meals. Observed 100% intake of breakfast meal this morning. Reports she is doing well and nothing to address at this time. Weight is stable. Noted plan for discharge on 10/11. Will monitor.     Information Obtained from:    [x] Chart Review   [x] Patient   [] Family/Caregiver   [] Nurse/Physician   [] Interdisciplinary Meeting/Rounds    Diet: Cardiac Diet  Medications: [x] Reviewed   Allergies: [x] Reviewed   Patient Active Problem List   Diagnosis Code    Bilateral nephrolithiasis N20.0    Renal cyst, left N28.1    New onset atrial fibrillation ([facility] [address] I48.91  HTN (hypertension) I10    CAD (coronary artery disease) I25.10    Hypercholesteremia E78.00    GERD (gastroesophageal reflux disease) K21.9    Stomatitis and mucositis K12.1, K12.30     Past Medical History:   Diagnosis Date    Coronary artery disease     Degenerative joint disease     Degenerative spine disease     Dyslipidemia     Hypertension     Hypertension     Kidney stone     Osteoarthritis       Labs:    Lab Results   Component Value Date/Time    Sodium 141 09/20/2018 11:12 AM    Potassium 4.3 09/20/2018 11:12 AM    Chloride 106 09/20/2018 11:12 AM    CO2 27 09/20/2018 11:12 AM    Anion gap 8 09/20/2018 11:12 AM    Glucose 104 (H) 09/20/2018 11:12 AM    BUN 26 (H) 09/20/2018 11:12 AM    Creatinine 1.19 09/20/2018 11:12 AM    Calcium 8.9 09/20/2018 11:12 AM    Magnesium 1.9 09/03/2011 08:15 PM    Albumin 3.7 04/09/2018 06:24 PM     Anthropometrics: BMI (calculated): 22  Last 3 Recorded Weights in this Encounter    09/24/18 1632 10/02/18 1613 10/09/18 1257   Weight: 58.7 kg (129 lb 6.4 oz) 58.2 kg (128 lb 6.4 oz) 58.2 kg (128 lb 3.2 oz)      Ht Readings from Last 1 Encounters:   10/09/18 5' 4\" (1.626 m)     No data found.      UBW: 125 lb   [] Weight Loss [] Weight Gain [x] Weight Stable    Estimated Nutrition Needs: [x] MSJ    Calories: 5298-0360 Kcal Based on:   [x] Actual BW    Protein:   60-70 g Based on:   [x] Actual BW    Fluid:       1500 ml Based on:   [x] Actual BW     Nutrition Problems Identified:   [] Suboptimal PO intake   [] Food Allergies  [] Difficulty chewing/swallowing/poor dentition  [] Constipation/Diarrhea   [] Nausea/Vomiting   [x] None  [] Other:     Plan:   [x] Therapeutic Diet  []  Obtained/adjusted food preferences/tolerances and/or snacks options   []  Supplements added   [] Occupational therapy following for feeding techniques  []  HS snack added   []  Modify diet texture   []  Modify diet for food allergies    []  Assist with menu selection   [x]  Monitor PO intake on meal rounds   [x]  Continue inpatient monitoring and intervention   [x]  Participated in discharge planning/Interdisciplinary rounds/Team meetings   []  Other:     Education Needs:   [] Not appropriate for teaching at this time due to:   [x] Identified and addressed    Nutrition Monitoring and Evaluation:  [x] Continue ongoing monitoring and intervention  [] Other    [employer]

## 2018-10-09 NOTE — PROGRESS NOTES
Problem: Mobility Impaired (Adult and Pediatric)  Goal: *Acute Goals and Plan of Care (Insert Text)  PHYSICAL THERAPY LTG GOALS :  Initiated 9/25/2018 and to be accomplished within 4 Weeks (Updated 10/8/18)    1. Patient will move from supine to sit and sit to supine  and roll side to side in bed with modified independence. (Achieved)     2. Patient will transfer from bed to chair and chair to bed with modified independence using LRAD. (Achieved with RW)   3.  Patient will perform sit to stand with modified independence with Good balance and safety awareness. (Achieved)   4. Patient will ambulate with supervision/set-up for 200 feet with LRAD on level surfaces and be able to maneuver through narrow spaces and obstacles without loss of balance. (Progressing; 154ft with RW)  5.  Patient will ascend/descend 3 stairs with right handrail(s) with stand by assistance to allow for safe home access/exit. (Achieved)   6. Patient will improve standardized test score for [place] Standing Balance Scale 4 to reduce fall risk. (Progressing; 3+)     PHYSICAL THERAPY STG GOALS :  Initiated 9/25/2018 and to be accomplished within 2 Weeks (updated 10/01/18)    1. Patient will move from supine to sit and sit to supine  and roll side to side in bed with supervision/set-up. (Achieved)     2. Patient will transfer from bed to chair and chair to bed with stand by assistance using RW. (Achieved)  3. Patient will perform sit to stand with stand by assistance using RW with Fair+ balance and safety awareness. (Achieved)  4. Patient will ambulate with stand by assistance for 150 feet with RW on level surfaces with 2 turns. (Achieved)  5. Patient will ascend/descend 3 stairs with right handrail(s) with contact guard assist to allow for safe home access/exit. (Achieved)  6. Patient will improve standardized test score for [place] Standing Balance Scale 3 to reduce fall risk.    (Progressing; 2+)    PHYSICAL THERAPY LTG GOALS :  Initiated 9/25/2018 and to be accomplished within 4 Weeks    1. Patient will move from supine to sit and sit to supine  and roll side to side in bed with modified independence. 2.  Patient will transfer from bed to chair and chair to bed with modified independence using LRAD. 3.  Patient will perform sit to stand with modified independence with Good balance and safety awareness. 4.  Patient will ambulate with supervision/set-up for 200 feet with LRAD on level surfaces and be able to maneuver through narrow spaces and obstacles without loss of balance. 5.  Patient will ascend/descend 3 stairs with right handrail(s) with stand by assistance to allow for safe home access/exit. 6.  Patient will improve standardized test score for [place] Standing Balance Scale 4 to reduce fall risk. Physical Therapist:   Josafat Treviño PT  on 9/25/2018               [address] [place] [address]   physical Therapy Daily Treatment note      Patient: Ilya Ghosh (89 y.o. female)               Date: 10/9/2018    Physician: Bria Cabezas MD  Primary Diagnosis: afib          Treatment Diagnosis  Treatment Diagnosis: increased need for assist with self care  Treatment Diagnosis 2: muscle weakness  Precautions: Fall  Vital Signs  Vital Signs  Temp: 97.9 °F (36.6 °C)  Temp Source: Oral  Pulse (Heart Rate): 70  Resp Rate: 20  O2 Sat (%): 100 %  Level of Consciousness: Alert  BP: 154/67  MAP (Calculated): 96  MEWS Score: 1     Cognitive Status:  Mental Status  Neurologic State: Appropriate for age  Orientation Level: Oriented X4  Cognition: Follows commands; Appropriate for age attention/concentration  Pain  Pain Screen  Pain Scale 1: Visual  Pain Intensity 1: 0  Patient Stated Pain Goal: 0  Bed Mobility Training     Balance  Sitting: Intact  Sitting - Static: Good (unsupported)  Sitting - Dynamic: Good (unsupported)  Standing: With support  Standing - Static: Good  Standing - Dynamic : Fair  Transfer Training  Transfer Training  Sit to Stand: Modified independent  Sit to Stand: Modified independent  Gait Training  Gait  Base of Support: Center of gravity altered  Speed/Mckenna: Pace decreased (<100 feet/min)  Step Length: Left shortened;Right shortened  Gait Abnormalities: Decreased step clearance  Ambulation - Level of Assistance: Supervision;Modified independent  Distance (ft): 154 Feet (ft) (mod (I) for short distances, supervision >~75 ft )  Assistive Device: Walker, rolling     Gait Abnormalities: Decreased step clearance            With 4 turns. Therapeutic Exercise:    Gait training as mentioned above, pt demonstrates mod (I) ambulation using RW, but more antalgic gait pattern is noted with greater distances of about 75 ft with audible clicking of R hip. Discussed areas of concern prior to d/c on 10/11. Pt reports that she usually uses quad cane in home as RW will not fit in some areas. Therapist assessed ambulation using large based quad cane, completed with supervision, no additional required for obstacle negotiation or turns. Therapist recommended use of RW for longer distances to take pressure off R hip, use of quad cane for short distances within home, pt agreeable. TE rendered to address strength, endurance, mobility and included: heel raises, toe raises, LAQ 20 reps x 2 sets; marching x 20 reps x 1 set due to reported discomfort in R hip. Patient/Caregiver Education:   Pt /Caregiver Education on safe ambulation in home with use of quad cane for short distances, pt agreeable. ASSESSMENT:  Patient continues to benefit from Skilled PT services to improve mobility. Progression toward goals:  [x]      Improving appropriately and progressing toward goals  []      Improving slowly and progressing toward goals  []      Not making progress toward goals and plan of care will be adjusted     Treatment session: 30 minutes.   Therapist:   Lulu Vidal, PT,          10/9/2018

## 2018-10-09 NOTE — ROUTINE PROCESS
Bedside and Verbal shift change report given to Regis Mcclain (oncoming nurse) by Dave KEMP (offgoing nurse). Report included the following information SBAR, Kardex and MAR. Qh visual checks and 24h chart checks done.

## 2018-10-09 NOTE — PROGRESS NOTES
conducted a Follow up consultation and Spiritual Assessment for [facility] ERNESTINE, who is a 80 y. o.,female. The  provided the following Interventions:  Continued the relationship of care and support. Listened empathically. Offered prayer and assurance of continued prayer on patients behalf. Chart reviewed. The following outcomes were achieved:  Patient expressed gratitude for 's visit. Assessment:  There are no spiritual or Jew issues which require intervention at this time. Plan:  Chaplains will continue to follow and will provide pastoral care on an as needed/requested basis.  recommends bedside caregivers page  on duty if patient shows signs of acute spiritual or emotional distress. Marisol Lerma M.Div.   , [facility]: [phone]/QXQ: [phone]

## 2018-10-09 NOTE — PROGRESS NOTES
I have reviewed this patient's current medication list and recent laboratory results. At this time, I do not suggest any drug therapy adjustments or additional laboratory monitoring. Thank you,  Humza ALLEN  Ph. M. S.  10/9/2018

## 2018-10-09 NOTE — PROGRESS NOTES
Late entry for 10/8/18: SW reviewed the Medicare notice and the appeal process with pt. Pt signed the notice and was given a copy.

## 2018-10-09 NOTE — PROGRESS NOTES
Problem: Self Care Deficits Care Plan (Adult)  Goal: *Acute Goals and Plan of Care (Insert Text)  OCCUPATIONAL THERAPY SHORT TERM GOALS     1. Patient will perform Upper body ADL's with adaptive equipment with supervision/set-up. 2.  Patient will perform Lower body ADL's with adaptive equipment as needed with CGA. 3.  Patient will perform functional transfers with Rolling Walker and supervision/set-up. 4.  Patient will perform grooming task in standing for 10 minutes using RW with Supervision and Good safety awareness. 5.  Patient will improve Barthel index scores to atleast 80/100 to improve functional mobility  6. Patient will utilize energy conservation techniques during functional activities with minimal verbal cues. Updated 10/8/18    1. Patient will perform Upper body ADL's with adaptive equipment with supervision/set-up. -CLOF: dressing w/ SBA using button hook, bathing w/ assist provided by CNA per pt report  2. Patient will perform Lower body ADL's with adaptive equipment as needed with CGA.-CLOF: dressing w/ Min A, bathing w/ assist provided by CNA per pt report  3. Patient will perform toileting task with standby assist with Good safety to reduce falls risk. -CLOF: Mod I, met goal-d/c goal  4. Patient will perform functional transfers with Rolling Walker and supervision/set-up. -CLOF: Mod I w/ RW for toilet transfer, SBA with tub-shower unit using tub transfer bench and grab bars  5. Patient will perform grooming task in standing for 10 minutes using RW with Supervision and Good safety awareness. -CLOF: 6 mins, good static, fair to fair+ dynamic w/ RW and Supv-SBA  6. Patient will improve Barthel index scores to atleast 80/100 to improve functional mobility. -CLOF: 75/100  7. Patient will utilize energy conservation techniques during functional activities with minimal verbal cues.  -CLOF: instruction provided and handout issued to increase accuracy with carryover      Updated 10/01/18    1. Patient will perform Upper body ADL's with adaptive equipment with supervision/set-up. 2.  Patient will perform Lower body ADL's with adaptive equipment as needed with CGA. 3.  Patient will perform toileting task with standby assist with Good safety to reduce falls risk. 4.  Patient will perform functional transfers with Rolling Walker and supervision/set-up. 5.  Patient will perform grooming task in standing for 10 minutes using RW with Supervision and Good safety awareness. 6.  Patient will improve Barthel index scores to atleast 80/100 to improve functional mobility  7. Patient will utilize energy conservation techniques during functional activities with minimal verbal cues. Initiated 9/25/2018 and to be accomplished within 2 Week(s)    1. Patient will perform Upper body ADL's with adaptive equipment with supervision/set-up. (progressing-currently SBA)  2.  Patient will perform Lower body ADL's with adaptive equipment as needed with moderate assistance. (goal met-UPG CGA)  3. Patient will perform toileting task with standby assist with Good safety to reduce falls risk. (progressing-currently CGA)  4. Patient will perform functional transfers with Rolling Walker and supervision/set-up. (progressing-currently SBA)  5. Patient will perform grooming task in standing for 5 minutes using RW with SBA and Good safety awareness. (goal met-UPG 10 mins with Supervision)  6. Patient will improve Barthel index scores to atleast 65/100 to improve functional mobility. (goal met-UPG 80/100)  7. Patient will utilize energy conservation techniques during functional activities with minimal verbal cues. (progressing)    OCCUPATIONAL THERAPY LONG TERM GOALS   Initiated 9/25/2018 and to be accomplished within 4 Week(s)    1. Patient will perform ADL's & IADLs with adaptive equipment as needed with modified independence.   2.  Patient will perform toileting task with modified independence with Good safety to reduce falls risk. 3.  Patient will perform all functional transfers utilizing least restrictive device and durable medical equipment as needed with modified independence and Good balance and safety awareness. 4.  Patient will perform standing static/dynamic activity for improved ADL/IADL function with modified independence and Good balance and safety awareness. 5.  Patient will improve Barthel index score to 95/100 to improve independence with mobility. 6. Patient will perform home making tasks and simple meal prep Mod I utilizing energy conservation techniques and good safety awareness.       Therapist: Claudia George    9/25/2018                 [facility] [place]   Occupational Therapy Daily Treatment note      Patient: Nikos Dawn (89 y.o. female)       Date: 10/9/2018  Attending Physician: Maureen Carlos MD  Primary Diagnosis: afib    Treatment Diagnosis  Treatment Diagnosis: increased need for assist with self care  Treatment Diagnosis 2: muscle weakness   Precautions : Precautions at Admission: Fall  Vital Signs:  Vital Signs  Temp: 97.9 °F (36.6 °C)  Temp Source: Oral  Pulse (Heart Rate): 70  Resp Rate: 20  O2 Sat (%): 100 %  BP: 154/67  MAP (Calculated): 96     Cognitive Status:  Mental Status  Neurologic State: Appropriate for age  Orientation Level: Oriented X4  Cognition: Follows commands  Pain:        Gross Assessment:     Coordination:     Bed Mobility:     Transfers:  Functional Transfers  Sit to Stand: Modified independent  Bathroom Mobility: Modified independent  Toilet Transfer : Modified independent  Functional Transfers  Bathroom Mobility: Modified independent  Toilet Transfer : Modified independent  Balance:  Balance  Sitting: Intact  Sitting - Static: Good (unsupported)  Sitting - Dynamic: Good (unsupported)  Standing: With support  Standing - Static: Good  Standing - Dynamic : Fair       Therapeutic Activities:  Provided patient with UB HEP and green T-Band in order to increase good UB muscle strength and functional activity tolerance upon discharge home needed for ADL/IADL task. Patient verbalized understanding and demonstrated 100% carryover with each exercise. Patient/Caregiver Education:    Pt. [employer] Education on see above.       ASSESSMENT:  Patient continues to demonstrate the need for skilled Occupational Therapy services to improve dynamic standing balance needed for bathing  Progression toward goals:  [x]      Improving appropriately and progressing toward goals  []      Improving slowly and progressing toward goals  []      Not making progress toward goals and plan of care will be adjusted     Treatment session:   30 minutes    Therapist:    ANNE MARIE Ely,  10/9/2018

## 2018-10-09 NOTE — PROGRESS NOTES
The patient was offered a group activity to listen to play cards on October 9, 2018. The patient fully participated in the activity. While attending the group the patient was alert and oriented, engaged in conversation with others, and was successfully able to play the game. The  will continue to offer group and 1:1 activities to the patient and encourage continued participation in the activities. See PACU flow sheets for ASU pre-op v/s

## 2018-10-10 PROCEDURE — 74011250637 HC RX REV CODE- 250/637: Performed by: INTERNAL MEDICINE

## 2018-10-10 RX ADMIN — ATORVASTATIN CALCIUM 40 MG: 40 TABLET, FILM COATED ORAL at 21:23

## 2018-10-10 RX ADMIN — DILTIAZEM HYDROCHLORIDE 180 MG: 180 CAPSULE, EXTENDED RELEASE ORAL at 08:29

## 2018-10-10 RX ADMIN — METOPROLOL TARTRATE 25 MG: 25 TABLET, FILM COATED ORAL at 08:29

## 2018-10-10 RX ADMIN — APIXABAN 2.5 MG: 2.5 TABLET, FILM COATED ORAL at 17:18

## 2018-10-10 RX ADMIN — APIXABAN 2.5 MG: 2.5 TABLET, FILM COATED ORAL at 08:29

## 2018-10-10 RX ADMIN — PANTOPRAZOLE SODIUM 40 MG: 40 TABLET, DELAYED RELEASE ORAL at 08:29

## 2018-10-10 RX ADMIN — METOPROLOL TARTRATE 25 MG: 25 TABLET, FILM COATED ORAL at 17:18

## 2018-10-10 NOTE — ROUTINE PROCESS
Bedside and Verbal shift change report given to antonio Resendez (oncoming nurse) by Melodie Hollingsworth (offgoing nurse). Report included the following information SBAR, Intake/Output and Recent Results.

## 2018-10-10 NOTE — PROGRESS NOTES
Problem: Mobility Impaired (Adult and Pediatric)  Goal: *Acute Goals and Plan of Care (Insert Text)  PHYSICAL THERAPY LTG GOALS :  Initiated 9/25/2018 and to be accomplished within 4 Weeks (Updated 10/8/18)    1. Patient will move from supine to sit and sit to supine  and roll side to side in bed with modified independence. (Achieved)     2. Patient will transfer from bed to chair and chair to bed with modified independence using LRAD. (Achieved with RW)   3.  Patient will perform sit to stand with modified independence with Good balance and safety awareness. (Achieved)   4. Patient will ambulate with supervision/set-up for 200 feet with LRAD on level surfaces and be able to maneuver through narrow spaces and obstacles without loss of balance. (Progressing; 154ft with RW)  5.  Patient will ascend/descend 3 stairs with right handrail(s) with stand by assistance to allow for safe home access/exit. (Achieved)   6. Patient will improve standardized test score for [place] Standing Balance Scale 4 to reduce fall risk. (Progressing; 3+)     PHYSICAL THERAPY STG GOALS :  Initiated 9/25/2018 and to be accomplished within 2 Weeks (updated 10/01/18)    1. Patient will move from supine to sit and sit to supine  and roll side to side in bed with supervision/set-up. (Achieved)     2. Patient will transfer from bed to chair and chair to bed with stand by assistance using RW. (Achieved)  3. Patient will perform sit to stand with stand by assistance using RW with Fair+ balance and safety awareness. (Achieved)  4. Patient will ambulate with stand by assistance for 150 feet with RW on level surfaces with 2 turns. (Achieved)  5. Patient will ascend/descend 3 stairs with right handrail(s) with contact guard assist to allow for safe home access/exit. (Achieved)  6. Patient will improve standardized test score for [place] Standing Balance Scale 3 to reduce fall risk.    (Progressing; 2+)    PHYSICAL THERAPY LTG GOALS :  Initiated 2018 and to be accomplished within 4 Weeks    1. Patient will move from supine to sit and sit to supine  and roll side to side in bed with modified independence. 2.  Patient will transfer from bed to chair and chair to bed with modified independence using LRAD. 3.  Patient will perform sit to stand with modified independence with Good balance and safety awareness. 4.  Patient will ambulate with supervision/set-up for 200 feet with LRAD on level surfaces and be able to maneuver through narrow spaces and obstacles without loss of balance. 5.  Patient will ascend/descend 3 stairs with right handrail(s) with stand by assistance to allow for safe home access/exit. 6.  Patient will improve standardized test score for [place] Standing Balance Scale 4 to reduce fall risk. Physical Therapist:   Lolis Cruz PT  on 2018               [address] [place] [address] physical Therapy Discharge Summary      Patient: Nikos Dawn (38 y.o. female)                  Date: 10/10/2018    Attending Physician: Maureen Carlos MD  Primary Diagnosis: afib       Treatment Diagnosis  Treatment Diagnosis: increased need for assist with self care  Treatment Diagnosis 2: muscle weakness         Precautions: Fall   MEDICAL HISTORY:   Past Medical History:   Diagnosis Date    Coronary artery disease     Degenerative joint disease     Degenerative spine disease     Dyslipidemia     Hypertension     Hypertension     Kidney stone     Osteoarthritis      Past Surgical History:   Procedure Laterality Date    HX APPENDECTOMY      HX HYSTERECTOMY         Reason for Discharge: [] Goals Met   [x]Met highest potential  []    [] Progress ceased  []Hospitalized  []Other: Pt/family request for early D/C from facility. Discharge Location:  [x] Private Residence  [] care home  [] LTC  []  [facility] []Other: 24 hr.supervision for safety.      Summarize skilled services provided and significant progress attained since last daily/weekly note (include individualized treatment techniques and standardized tests): This Patient has received skilled PT services from 9/25/18  through 10/10/18 and has made Good progress towards their PT goals. Improvements noted in sit<>stand, transfers, gait and stair negotiation.    Summary of education/recommendation provided to Patient/Caregivers:    Pt. Education provided to use [employer]      Objective Data:     INITIAL  ASSESSMENT DISCHARGE ASSESSMENT   COGNITIVE STATUS COGNITIVE STATUS   Neurologic State: Alert  Orientation Level: Oriented X4  Cognition: Appropriate decision making, Appropriate for age attention/concentration, Appropriate safety awareness, Follows commands  Perception: Appears intact  Perseveration: No perseveration noted  Safety/Judgement: Fall prevention Neurologic State: Appropriate for age, Alert  Orientation Level: Oriented X4  Cognition: Follows commands, Appropriate for age attention/concentration  Perception: Appears intact  Perseveration: No perseveration noted  Safety/Judgement: Fall prevention   PAIN PAIN   Pain Scale 1: Numeric (0 - 10)  Pain Intensity 1: 0  Pain Onset 1: chronic  Pain Location 1: Shoulder  Pain Orientation 1: Right  Pain Description 1: Aching  Pain Intervention(s) 1: Distraction, Food, Repositioned  Patient Stated Pain Goal: 0  Pain Reassessment 1: Patient sleeping Pain Scale 1: Visual  Pain Intensity 1: 3  Pain Onset 1: chronic  Pain Location 1: Leg  Pain Orientation 1: Right  Pain Description 1: Aching  Pain Intervention(s) 1: Medication (see MAR)  Patient Stated Pain Goal: 1  Pain Reassessment 1: Yes   GROSS ASSESSMENT GROSS ASSESSMENT   AROM: Generally decreased, functional  PROM: Generally decreased, functional (LUE shoulder flexion 100 degrees)  Strength: Generally decreased, functional (R hip 3-/5, R knee 4+5; LLE grossly 4+/5)  Coordination: Generally decreased, functional  Tone: Normal  Sensation: Intact (BUEs) AROM: Generally decreased, functional (LUE shoulder flexion 90 degrees, RUE WFL)  PROM: Generally decreased, functional (LUE shoulder flexion 100 degrees)  Strength: Generally decreased, functional (BUEs 3+/5)  Coordination: Generally decreased, functional (BUEs)  Tone: Normal (BUEs)  Sensation: Intact (BUEs)   BED MOBILITY BED MOBILITY   Rolling:  (not assessed)  Supine to Sit: Minimum assistance (for upper body )  Scooting: Stand-by assistance Rolling: Modified independent  Supine to Sit: Modified independent  Scooting: Stand-by assistance   GAIT GAIT   Base of Support: Center of gravity altered  Speed/Mckenna: Pace decreased (<100 feet/min)  Step Length: Left shortened, Right shortened  Gait Abnormalities: Decreased step clearance, Toe walking  Ambulation - Level of Assistance: Contact guard assistance  Distance (ft): 120 Feet (ft)  Assistive Device: Gait belt, Walker, rolling  Rail Use: Both  Stairs - Level of Assistance: Contact guard assistance  Number of Stairs Trained: 5  Interventions: Safety awareness training, Verbal cues Base of Support: Center of gravity altered  Speed/Mckenna: Pace decreased (<100 feet/min)  Step Length: Right shortened, Left shortened  Gait Abnormalities: Decreased step clearance  Ambulation - Level of Assistance: Supervision  Distance (ft): 150 Feet (ft) (x2)  Assistive Device: Gait belt, Walker, rolling  Rail Use: Right   Stairs - Level of Assistance:  (close (S) )  Number of Stairs Trained: 5  Interventions: Verbal cues   Exceptions to WDL Exceptions to WDL                With 4 turns.    TRANSFERS TRANSFERS   Sit to Stand: Contact guard assistance  Stand to Sit: Contact guard assistance Sit to Stand: Modified independent  Stand to Sit: Modified independent   BALANCE BALANCE   Sitting: Without support  Sitting - Static: Good (unsupported)  Sitting - Dynamic: Fair (occasional) (+)  Standing: With support  Standing - Static: Fair  Standing - Dynamic : Fair Sitting: Intact  Sitting - Static: Good (unsupported)  Sitting - Dynamic: Good (unsupported)  Standing: With support  Standing - Static: Good  Standing - Dynamic : Fair   WHEELCHAIR MOBILITY/MGMT WHEELCHAIR MOBILITY/MGMT          With 0 turns   Visual/Perceptual      Corrective Lenses: Contacts    Auditory:   Auditory  Auditory Impairment: None         Visual/Perceptual      Corrective Lenses: Contacts    Auditory:   Auditory  Auditory Impairment: None          Activity Tolerance:  Good                     Treatment:   Gait training x150ft x2 with RW and (S) with no noted LOB and good safety awareness. Therapist reviewed HEP with pt. Pt reported no difficulty with completing TE's. Pt reported looking into assistance at home with [employer]. Pt's son to arrive today in preparation for pt's DC to home. Pt reported her son will be staying with her for awhile and until she can find assistance at home. Pt reported no additional questions/concerns at this time. Discharge Recommendations:  [x]  Home Exercise Program     [x]  Home Health PT   [x]  Remove throw rugs/clear environmental barriers    []  Assistance with:      [x]  Ambulation Device: Rolling Walker   [x]  Steps with right & close (S)     []  Wheelchair   []  Life Line/alert   []  WC  Ramp       Treatment minutes: 30 minutes.     Therapist :  Tyler Courtney PTA            Date:10/10/2018

## 2018-10-10 NOTE — ROUTINE PROCESS
Bedside and verbal shift report given to BOLA Clarke LPN (oncoming nurse) by L. Leana Mortimer, LPN (off going nurse). Report included SBAR, MAR and Kardex.

## 2018-10-10 NOTE — PROGRESS NOTES
Problem: Self Care Deficits Care Plan (Adult)  Goal: *Acute Goals and Plan of Care (Insert Text)  OCCUPATIONAL THERAPY SHORT TERM GOALS       Discharge 10/10/18    1. Patient will perform Upper body ADL's with adaptive equipment with supervision/set-up. -CLOF: dressing w/ SBA using button hook, bathing w/ assist provided by CNA per pt report  2. Patient will perform Lower body ADL's with adaptive equipment as needed with CGA.-CLOF: dressing w/ Min A, bathing w/ assist provided by CNA per pt report  3. Patient will perform functional transfers with Rolling Walker and supervision/set-up. -CLOF: Mod I w/ RW for toilet transfer, SBA with tub-shower unit using tub transfer bench and grab bars  4. Patient will perform grooming task in standing for 10 minutes using RW with Supervision and Good safety awareness. -CLOF: 10 mins, good static,  fair+ dynamic w/ RW and Mod I   5. Patient will improve Barthel index scores to atleast 80/100 to improve functional mobility. -CLOF: 80/100  6. Patient will utilize energy conservation techniques during functional activities with minimal verbal cues. -CLOF: patient able to recall energy conservation techniques       Updated 10/8/18    1. Patient will perform Upper body ADL's with adaptive equipment with supervision/set-up. -CLOF: dressing w/ SBA using button hook, bathing w/ assist provided by CNA per pt report  2. Patient will perform Lower body ADL's with adaptive equipment as needed with CGA.-CLOF: dressing w/ Min A, bathing w/ assist provided by CNA per pt report  3. Patient will perform toileting task with standby assist with Good safety to reduce falls risk. -CLOF: Mod I, met goal-d/c goal  4. Patient will perform functional transfers with Rolling Walker and supervision/set-up. -CLOF: Mod I w/ RW for toilet transfer, SBA with tub-shower unit using tub transfer bench and grab bars  5.   Patient will perform grooming task in standing for 10 minutes using RW with Supervision and Good safety awareness. -CLOF: 6 mins, good static, fair to fair+ dynamic w/ RW and Supv-SBA  6. Patient will improve Barthel index scores to atleast 80/100 to improve functional mobility. -CLOF: 75/100  7. Patient will utilize energy conservation techniques during functional activities with minimal verbal cues. -CLOF: instruction provided and handout issued to increase accuracy with carryover      Updated 10/01/18    1. Patient will perform Upper body ADL's with adaptive equipment with supervision/set-up. 2.  Patient will perform Lower body ADL's with adaptive equipment as needed with CGA. 3.  Patient will perform toileting task with standby assist with Good safety to reduce falls risk. 4.  Patient will perform functional transfers with Rolling Walker and supervision/set-up. 5.  Patient will perform grooming task in standing for 10 minutes using RW with Supervision and Good safety awareness. 6.  Patient will improve Barthel index scores to atleast 80/100 to improve functional mobility  7. Patient will utilize energy conservation techniques during functional activities with minimal verbal cues. Initiated 9/25/2018 and to be accomplished within 2 Week(s)    1. Patient will perform Upper body ADL's with adaptive equipment with supervision/set-up. (progressing-currently SBA)  2.  Patient will perform Lower body ADL's with adaptive equipment as needed with moderate assistance. (goal met-[facility] CGA)  3. Patient will perform toileting task with standby assist with Good safety to reduce falls risk. (progressing-currently CGA)  4. Patient will perform functional transfers with Rolling Walker and supervision/set-up. (progressing-currently SBA)  5. Patient will perform grooming task in standing for 5 minutes using RW with SBA and Good safety awareness. (goal met-[facility] 10 mins with Supervision)  6.   Patient will improve Barthel index scores to atleast 65/100 to improve functional mobility. (goal met-[facility] 80/100)  7. Patient will utilize energy conservation techniques during functional activities with minimal verbal cues. (progressing)    OCCUPATIONAL THERAPY LONG TERM GOALS   Initiated 9/25/2018 and to be accomplished within 4 Week(s)    1. Patient will perform ADL's & IADLs with adaptive equipment as needed with modified independence. -CLOF: UB dressing w/ SBA using button hook, UB bathing w/ assist provided by CNA per pt report, LB dressing w/ Min A, LB bathing w/ assist provided by CNA per pt report, IADLs: recommend assist with medicine mgmt 2/2 appears confused and frustrated with simulated medicine mgmt utilizing weekly pill organizer box  2. Patient will perform toileting task with modified independence with Good safety to reduce falls risk. -CLOF: Mod I  3. Patient will perform all functional transfers utilizing least restrictive device and durable medical equipment as needed with modified independence and Good balance and safety awareness. -CLOF: Mod I w/ RW for toilet transfer, SBA with tub-shower unit using tub transfer bench and grab bars  4. Patient will perform standing static/dynamic activity for improved ADL/IADL function with modified independence and Good balance and safety awareness. -CLOF: good static,  fair+ dynamic w/ RW and Mod I   5. Patient will improve Barthel index score to 95/100 to improve independence with mobility. -CLOF: 80/100  6. Patient will perform home making tasks and simple meal prep Mod I utilizing energy conservation techniques and good safety awareness. - -CLOF: recommend [facility] services to assess                  [address] THERAPY DISCHARGE SUMMARY      Patient: Merrick Hargrove (21 y.o. female)               Date: 10/10/2018    Attending Physician: Moses Park MD  Primary Diagnosis: afib       Treatment Diagnosis  Treatment Diagnosis: increased need for assist with self care  Treatment Diagnosis 2: muscle weakness         Precautions: Fall Medical History:   Past Medical History:   Diagnosis Date    Coronary artery disease     Degenerative joint disease     Degenerative spine disease     Dyslipidemia     Hypertension     Hypertension     Kidney stone     Osteoarthritis      Past Surgical History:   Procedure Laterality Date    HX APPENDECTOMY      HX HYSTERECTOMY         Reason for Discharge: []Goals Met   [x]Met highest potential  []Hospitalized   []  Progress ceased  []   []Other: Patient/family requesting D/C from facility. Discharge Location:  [x]Private Residence  [] residential []LTC  [] [facility] [] 24 hr. Supervision for safety    Summarize skilled services provided and significant progress attained since last daily/weekly note (include individualized treatment techniques and standardized tests):   Patient has received skilled OT services from 18 to 10/10/18 and has made good progress towards their OT goals. Improvements noted in: grooming, bathing, upper body dressing, lower body dressing, toileting, toilet transfer, tub transfer and simple home management    Summary of education/recommendation provided to Patient/Caregivers in the following areas: Assist w/ ADLs, IADLs, remove barriers/rugs, mobility w/ RW for grooming, bathing, upper body dressing, lower body dressing, toileting and toilet transfer, OT home health services to address tub/shower transfer utilizing DME e.g. tub transfer bench and grab bars, assess IADLS e.g. Med mgmt, meal prep & home making tasks.        Objective Data:      INITIAL ASSESSMENT DISCHARGE ASSESSMENT   COGNITIVE STATUS: COGNITIVE STATUS:   Neurologic State: Alert  Orientation Level: Oriented X4  Cognition: Appropriate decision making, Appropriate for age attention/concentration, Appropriate safety awareness, Follows commands  Perception: Appears intact  Perseveration: No perseveration noted  Safety/Judgement: Fall prevention Mental Status  Neurologic State: Appropriate for age  Orientation Level:  Oriented X4  Cognition: Follows commands  Perception: Appears intact  Perseveration: No perseveration noted  Safety/Judgement: Fall prevention   PAIN: PAIN:   Pain Scale 1: Numeric (0 - 10)  Pain Intensity 1: 0  Pain Location 1: Shoulder  Pain Orientation 1: Right  Pain Description 1: Aching  Pain Intervention(s) 1: Distraction, Food, Repositioned  Patient Stated Pain Goal: 0  Pain Reassessment 1: Patient sleeping Pain Screen  Pain Scale 1: Visual  Pain Intensity 1: 0  Pain Location 1: Shoulder  Pain Orientation 1: Right  Pain Description 1: Aching  Pain Intervention(s) 1: Medication (see MAR)  Patient Stated Pain Goal: 0  Pain Reassessment 1: Patient sleeping   BED MOBILITY BED MOBILITY   Rolling:  (not assessed)  Supine to Sit: Minimum assistance (for upper body )  Scooting: Stand-by assistance Bed Mobility  Rolling: Modified independent  Supine to Sit: Modified independent  Scooting: Stand-by assistance   ADL SELF CARE ADL SELF CARE   Oral Facial Hygiene/Grooming: Modified Independent  Type of Bath: Basin/Soap/Water  Upper Body Dressing: Stand-by assistance (with button hook) Basic ADL  Oral Facial Hygiene/Grooming: Modified Independent  Type of Bath: Basin/Soap/Water  Upper Body Dressing: Stand-by assistance (with button hook)   ADL INTERVENTION ADL INTERVENTION   Bathing Assistance: Contact guard assistance Upper Body Bathing  Bathing Assistance: Contact guard assistance    Upper Body Dressing Assistance  Dressing Assistance: Contact guard assistance    Lower Body Bathing  Bathing Assistance: Maximum assistance    Toileting  Toileting Assistance: Contact guard assistance  Bladder Hygiene: Contact guard assistance  Bowel Hygiene: Contact guard assistance  Clothing Management: Contact guard assistance    Grooming  Grooming Assistance: Modified independent (standing, sinkside)  Washing Face: Modified independent  Brushing Teeth: Contact guard assistance (in stance w/ RW)    Feeding  Feeding Assistance: Modified independent   TRANSFERS TRANSFERS   Sit to Stand: Contact guard assistance  Stand to Sit: Contact guard assistance  Bathroom Mobility: Modified independent  Toilet Transfer : Contact guard assistance (w/ RW & 3 in 1 commode over toilet)  Tub Transfer: Stand-by assistance (using tub transfer bench and grab bars) Functional Transfers  Sit to Stand: Modified independent  Stand to Sit: Modified independent  Bathroom Mobility: Modified independent  Toilet Transfer : Modified independent  Tub Transfer: Stand-by assistance (using tub transfer bench and grab bars)   BALANCE BALANCE   Sitting: Without support  Sitting - Static: Good (unsupported)  Sitting - Dynamic: Fair (occasional) (+)  Standing: With support  Standing - Static: Fair  Standing - Dynamic : Fair Balance  Sitting: Intact  Sitting - Static: Good (unsupported)  Sitting - Dynamic: Good (unsupported)  Standing: With support  Standing - Static: Good  Standing - Dynamic : Fair   GROSS ASSESSMENT  GROSS ASSESSMENT   AROM: Generally decreased, functional  PROM: Generally decreased, functional (LUE shoulder flexion 100 degrees)  Strength: Generally decreased, functional (R hip 3-/5, R knee 4+5; LLE grossly 4+/5)  Coordination: Generally decreased, functional  Tone: Normal  Sensation: Intact (BUEs) Gross Assessment  AROM: Generally decreased, functional (LUE shoulder flexion 90 degrees, RUE WFL)  PROM: Generally decreased, functional (LUE shoulder flexion 100 degrees)  Strength: Generally decreased, functional (BUEs 3+/5)  Coordination: Generally decreased, functional (BUEs)  Tone: Normal (BUEs)  Sensation: Intact (BUEs)   COORDINATION COORDINATION   Fine Motor Skills-Upper: Left Intact, Right Intact  Gross Motor Skills-Upper: Left Intact, Right Intact Coordination  Fine Motor Skills-Upper: Left Intact, Right Intact  Gross Motor Skills-Upper: Left Intact, Right Intact   VISUAL/PERCEPTUAL VISUAL/PERCEPTUAL   Corrective Lenses: Contacts   Vision  Corrective Lenses: Contacts     AUDITORY: AUDITORY:   Auditory Impairment: None Auditory  Auditory Impairment: None   I ADL'S:  I ADL'S:           THE BARTHEL INDEX    ACTIVITY   SCORE   FEEDING  0=unable  5=needs help cutting,spreading butter,etc., or modified diet  10= independent   10   BATHING  0=dependent  5=independent (or in shower   0   GROOMING  0=needs help  5=independent face/hair/teeth/shaving (implements provided)   5   DRESSING  0=dependent  5=needs help but can do about half unaided  10=independent(including buttons, zips,laces etc.)   5   BOWELS  0=incontinent  5=occasional accident  10=continent   10   BLADDER  0=incontinent, or catheterized and unable to manage alone  5=occasional accident  10=continent   10   TOILET USE  0=dependent  5=needs some help, but can do something alone  10=independent (on and off, dressing, wiping)   10   TRANSFER (BED TO CHAIR AND BACK)  0=unable, no sitting balance  5=major help(one or two people,physical), can sit  10=minor help(verbal or physical)  15=independent   15   MOBILITY (ON LEVEL SURFACES)  0=immobile or <50 yards  5=wheelchair independent,including corners,>50 yards  10=walkes with help of one person (verbal or physical) >50 yards  15=independent(but may use any aid; for example, stick) >50 yards   10   STAIRS  0=unable  5=needs help (verbal, physical, carrying aid)  10=independent   5              TOTAL:               80/100     Treatment : Grooming tasks in stance at sink w/ RW I.e. Oral hygiene and combing hair w/ Mod I ~ 10 mins. Patient reports she would sit on bathroom stool at sinkside for grooming task, has a 3 in 1 commode over toilet. RW at [facility]. Therapist recommended patient wait for OT [facility] services to assess tub/shower transfer utilizing tub transfer bench and shower tasks in order to make appropriate recommendations and/or modifications as needed e.g. installation of grab bars, patient verbalized understanding.  Visual aide issued for tub transfer bench, grab bar and instructions on technique for tub/shower transfer using tub transfer bench and grab bar provided in order to increase accuracy with carryover with therapist recommendation to install grab bars, patient states she has a long handle shower head on hose to grade bathing easier. IADLs: recommend assist with medicine mgmt 2/2 appears confused and frustrated with simulated medicine mgmt utilizing weekly pill organizer box. Patient reports she has Life Alert, sock aid and reacher. Discharge Recommendations:  [] Home Exercise Program    [x] Elevated toilet seat/3 in 1 commode   [] Shower Chair      [x]Shower Bench-recommend OT [facility] services to assess    [x] Life Line/alert   [] Splint/orthotic application/schedule  [x] Grab Bars: Location -recommend OT  services to assess for installation in tub/shower unit  [x] Home Health OT       [x] [facility] [facility]  [] WC     Treatment session:  35 minutes.     Therapist: Cristina Basilio      Date:10/10/2018

## 2018-10-11 ENCOUNTER — HOME HEALTH ADMISSION (OUTPATIENT)
Dept: HOME HEALTH SERVICES | Facility: HOME HEALTH | Age: 83
End: 2018-10-11
Payer: MEDICARE

## 2018-10-11 VITALS
SYSTOLIC BLOOD PRESSURE: 168 MMHG | WEIGHT: 128.2 LBS | OXYGEN SATURATION: 95 % | RESPIRATION RATE: 18 BRPM | TEMPERATURE: 97.7 F | DIASTOLIC BLOOD PRESSURE: 68 MMHG | HEIGHT: 64 IN | HEART RATE: 68 BPM | BODY MASS INDEX: 21.89 KG/M2

## 2018-10-11 PROCEDURE — 74011250637 HC RX REV CODE- 250/637: Performed by: INTERNAL MEDICINE

## 2018-10-11 RX ORDER — METOPROLOL TARTRATE 25 MG/1
25 TABLET, FILM COATED ORAL EVERY 12 HOURS
Qty: 180 TAB | Refills: 2 | Status: SHIPPED | OUTPATIENT
Start: 2018-10-11 | End: 2020-09-12

## 2018-10-11 RX ORDER — DILTIAZEM HYDROCHLORIDE 180 MG/1
180 CAPSULE, COATED, EXTENDED RELEASE ORAL DAILY
Qty: 90 CAP | Refills: 3 | Status: SHIPPED | OUTPATIENT
Start: 2018-10-11 | End: 2020-09-12

## 2018-10-11 RX ORDER — NITROGLYCERIN 0.4 MG/1
0.4 TABLET SUBLINGUAL
Qty: 30 TAB | Refills: 2 | Status: SHIPPED | OUTPATIENT
Start: 2018-10-11

## 2018-10-11 RX ADMIN — APIXABAN 2.5 MG: 2.5 TABLET, FILM COATED ORAL at 11:51

## 2018-10-11 RX ADMIN — DILTIAZEM HYDROCHLORIDE 180 MG: 180 CAPSULE, EXTENDED RELEASE ORAL at 11:51

## 2018-10-11 RX ADMIN — METOPROLOL TARTRATE 25 MG: 25 TABLET, FILM COATED ORAL at 11:51

## 2018-10-11 RX ADMIN — PANTOPRAZOLE SODIUM 40 MG: 40 TABLET, DELAYED RELEASE ORAL at 07:30

## 2018-10-11 NOTE — PROGRESS NOTES
Sydney spoke with pt and her son re; home health. Pt's son encourage pt to have home health. Pt agreeable to referral to [facility]. Pt signed the [facility] and was given a copy and brochure. SYDNEY spoke with home health liaison re: referral and pt's d/c home today.

## 2018-10-11 NOTE — PROGRESS NOTES
The patient was offered a group activity of watching a movie on October 11, 2018. The patient declined this activity. The  will continue to offer group and 1:1 activities and encourage the patient to participate in the activities.

## 2018-10-11 NOTE — PROGRESS NOTES
Home care referral sent to [address] via [address] [facility] and called to intake nurse,  Heidi Sarmiento.     Care Management Interventions  Transition of Care Consult (CM Consult): [address]: Yes  Plan discussed with Pt/Family/Caregiver: Yes  Freedom of Choice Offered: Yes  Discharge Location  Discharge Placement: Home with home health

## 2018-10-11 NOTE — ROUTINE PROCESS
Bedside and verbal shift report given to VIRIDIANA Agrawal (oncoming nurse) by MARTHA Szymanski LPN (off going nurse) Report included SBAR, MAR and Kardex.

## 2018-10-11 NOTE — PROGRESS NOTES
The patient was offered a group activity to play Wii Sports October 9, 2018. The patient fully participated in the activity. While attending the group the patient was alert and oriented, engaged in conversation with others, and was successfully able to play the game. The  will continue to offer group and 1:1 activities to the patient and encourage continued participation in the activities.

## 2018-10-11 NOTE — PROGRESS NOTES
The patient was offered a group activity of playing on October 11, 2018. The patient declined this activity due to waiting to discharge.

## 2018-10-11 NOTE — PROGRESS NOTES
SW spoke with pt re: home health referral. Pt seems indecisive about home health. SW agreed to speak with pt again once her son comes in today.

## 2018-10-11 NOTE — DISCHARGE INSTRUCTIONS

## 2018-10-12 ENCOUNTER — HOME CARE VISIT (OUTPATIENT)
Dept: HOME HEALTH SERVICES | Facility: HOME HEALTH | Age: 83
End: 2018-10-12

## 2018-10-12 ENCOUNTER — HOME CARE VISIT (OUTPATIENT)
Dept: SCHEDULING | Facility: HOME HEALTH | Age: 83
End: 2018-10-12
Payer: MEDICARE

## 2018-10-12 VITALS
DIASTOLIC BLOOD PRESSURE: 58 MMHG | BODY MASS INDEX: 23.04 KG/M2 | RESPIRATION RATE: 14 BRPM | HEART RATE: 69 BPM | SYSTOLIC BLOOD PRESSURE: 118 MMHG | OXYGEN SATURATION: 98 % | WEIGHT: 130 LBS | TEMPERATURE: 97.5 F | HEIGHT: 63 IN

## 2018-10-12 PROCEDURE — 400013 HH SOC

## 2018-10-12 PROCEDURE — G0299 HHS/HOSPICE OF RN EA 15 MIN: HCPCS

## 2018-10-12 PROCEDURE — 3331090001 HH PPS REVENUE CREDIT

## 2018-10-12 PROCEDURE — 3331090002 HH PPS REVENUE DEBIT

## 2018-10-12 NOTE — DISCHARGE SUMMARY
[address]    Sy Catalan  MR#: [id number]  : 1923  ACCOUNT #: [de-identified]   ADMIT DATE: 2018  DISCHARGE DATE: 10/11/2018    DISCHARGE DIAGNOSES:  1. Deconditioning. 2.  New onset atrial fibrillation. 3.  Coronary artery disease, status post myocardial infarction in .  4.  Gastroesophageal reflux disease. 5.  Osteopenia. 6.  Chronic anemia. 7.  Spinal spondylosis with stenosis, status post epidural injection. 8.  Osteoarthritis. 9.  Hypertension. 10.  Hypercholesterolemia. 11.  Status post appendectomy, hysterectomy, and coronary angioplasty. 12.  ALLERGIC TO PENICILLIN, SULFA, AND MACRODANTIN. DISCHARGE MEDICATIONS:  1.  Eliquis 2.5 mg twice daily. 2.  Protonix 40 mg daily. 3.  Vitamin D 50,000 units weekly. 4.  MiraLax 1 packet daily. 5.  Pravachol 40 mg daily. 6.  Tramadol 1 every 8 hours as needed for pain. 7.  Cardizem- mg daily. 8.  Lopressor 25 mg every 12 hours. 9.  Sublingual nitroglycerin as needed. DISPOSITION:  Home with Home Health. FOLLOWUP PLANS:  In my office in 1 week. REASON FOR ADMISSION, HOSPITAL COURSE:  This is a pleasant 70-year-old female with multiple medical problems, was initially admitted to the acute side with new onset atrial fib with rapid response. After medical stabilization, the patient was found to be deconditioned and she was transferred to the transitional care unit for continued monitoring and rehab. The patient progressed satisfactorily with rehab with increased level of function. The patient is currently able to ambulate with a walker. She has had no side effects from anticoagulation. She has remained stable from the hemodynamic and cardiopulmonary standpoint. Today, day of discharge, she was up in a chair. Son was in the room. PHYSICAL EXAMINATION   VITAL SIGNS:  Stable. GENERAL:  She was in no acute distress. LUNGS:  Clear to auscultation.   HEART: Regular. EXTREMITIES:  Without edema. The patient is felt to have achieved maximum benefits of her stay and will be discharged home for outpatient followup. MD NKECHI Tucker/JOSÉ MIGUEL  D: 10/11/2018 23:52     T: 10/12/2018 12:34  JOB #: [id number]

## 2018-10-13 PROCEDURE — 3331090002 HH PPS REVENUE DEBIT

## 2018-10-13 PROCEDURE — 3331090001 HH PPS REVENUE CREDIT

## 2018-10-14 PROCEDURE — 3331090002 HH PPS REVENUE DEBIT

## 2018-10-14 PROCEDURE — 3331090001 HH PPS REVENUE CREDIT

## 2018-10-15 ENCOUNTER — HOME CARE VISIT (OUTPATIENT)
Dept: HOME HEALTH SERVICES | Facility: HOME HEALTH | Age: 83
End: 2018-10-15
Payer: MEDICARE

## 2018-10-15 ENCOUNTER — HOME CARE VISIT (OUTPATIENT)
Dept: SCHEDULING | Facility: HOME HEALTH | Age: 83
End: 2018-10-15
Payer: MEDICARE

## 2018-10-15 VITALS
DIASTOLIC BLOOD PRESSURE: 72 MMHG | TEMPERATURE: 98.8 F | HEART RATE: 69 BPM | RESPIRATION RATE: 20 BRPM | SYSTOLIC BLOOD PRESSURE: 148 MMHG | OXYGEN SATURATION: 98 %

## 2018-10-15 PROCEDURE — 3331090001 HH PPS REVENUE CREDIT

## 2018-10-15 PROCEDURE — 3331090002 HH PPS REVENUE DEBIT

## 2018-10-15 PROCEDURE — G0299 HHS/HOSPICE OF RN EA 15 MIN: HCPCS

## 2018-10-16 ENCOUNTER — HOME CARE VISIT (OUTPATIENT)
Dept: SCHEDULING | Facility: HOME HEALTH | Age: 83
End: 2018-10-16
Payer: MEDICARE

## 2018-10-16 PROCEDURE — 3331090002 HH PPS REVENUE DEBIT

## 2018-10-16 PROCEDURE — 3331090001 HH PPS REVENUE CREDIT

## 2018-10-17 ENCOUNTER — HOME CARE VISIT (OUTPATIENT)
Dept: SCHEDULING | Facility: HOME HEALTH | Age: 83
End: 2018-10-17
Payer: MEDICARE

## 2018-10-17 VITALS
DIASTOLIC BLOOD PRESSURE: 75 MMHG | OXYGEN SATURATION: 96 % | SYSTOLIC BLOOD PRESSURE: 149 MMHG | HEART RATE: 79 BPM | TEMPERATURE: 98.6 F

## 2018-10-17 VITALS
OXYGEN SATURATION: 94 % | DIASTOLIC BLOOD PRESSURE: 58 MMHG | HEART RATE: 69 BPM | SYSTOLIC BLOOD PRESSURE: 140 MMHG | TEMPERATURE: 97.3 F

## 2018-10-17 PROCEDURE — 3331090001 HH PPS REVENUE CREDIT

## 2018-10-17 PROCEDURE — G0151 HHCP-SERV OF PT,EA 15 MIN: HCPCS

## 2018-10-17 PROCEDURE — G0152 HHCP-SERV OF OT,EA 15 MIN: HCPCS

## 2018-10-17 PROCEDURE — 3331090002 HH PPS REVENUE DEBIT

## 2018-10-18 PROCEDURE — 3331090001 HH PPS REVENUE CREDIT

## 2018-10-18 PROCEDURE — 3331090002 HH PPS REVENUE DEBIT

## 2018-10-19 ENCOUNTER — HOME CARE VISIT (OUTPATIENT)
Dept: SCHEDULING | Facility: HOME HEALTH | Age: 83
End: 2018-10-19
Payer: MEDICARE

## 2018-10-19 VITALS
SYSTOLIC BLOOD PRESSURE: 110 MMHG | TEMPERATURE: 98.6 F | OXYGEN SATURATION: 98 % | RESPIRATION RATE: 20 BRPM | HEART RATE: 72 BPM | DIASTOLIC BLOOD PRESSURE: 53 MMHG

## 2018-10-19 PROCEDURE — 3331090002 HH PPS REVENUE DEBIT

## 2018-10-19 PROCEDURE — G0299 HHS/HOSPICE OF RN EA 15 MIN: HCPCS

## 2018-10-19 PROCEDURE — 3331090001 HH PPS REVENUE CREDIT

## 2018-10-19 PROCEDURE — G0157 HHC PT ASSISTANT EA 15: HCPCS

## 2018-10-20 VITALS — HEART RATE: 72 BPM | DIASTOLIC BLOOD PRESSURE: 70 MMHG | TEMPERATURE: 98.2 F | SYSTOLIC BLOOD PRESSURE: 122 MMHG

## 2018-10-20 PROCEDURE — 3331090002 HH PPS REVENUE DEBIT

## 2018-10-20 PROCEDURE — 3331090001 HH PPS REVENUE CREDIT

## 2018-10-21 PROCEDURE — 3331090001 HH PPS REVENUE CREDIT

## 2018-10-21 PROCEDURE — 3331090002 HH PPS REVENUE DEBIT

## 2018-10-22 ENCOUNTER — HOME CARE VISIT (OUTPATIENT)
Dept: SCHEDULING | Facility: HOME HEALTH | Age: 83
End: 2018-10-22
Payer: MEDICARE

## 2018-10-22 VITALS
HEART RATE: 70 BPM | SYSTOLIC BLOOD PRESSURE: 140 MMHG | TEMPERATURE: 98.9 F | DIASTOLIC BLOOD PRESSURE: 80 MMHG | OXYGEN SATURATION: 97 % | RESPIRATION RATE: 20 BRPM

## 2018-10-22 VITALS
SYSTOLIC BLOOD PRESSURE: 140 MMHG | TEMPERATURE: 98.9 F | DIASTOLIC BLOOD PRESSURE: 70 MMHG | HEART RATE: 70 BPM | OXYGEN SATURATION: 97 %

## 2018-10-22 PROCEDURE — G0157 HHC PT ASSISTANT EA 15: HCPCS

## 2018-10-22 PROCEDURE — 3331090002 HH PPS REVENUE DEBIT

## 2018-10-22 PROCEDURE — G0299 HHS/HOSPICE OF RN EA 15 MIN: HCPCS

## 2018-10-22 PROCEDURE — 3331090001 HH PPS REVENUE CREDIT

## 2018-10-22 PROCEDURE — G0152 HHCP-SERV OF OT,EA 15 MIN: HCPCS

## 2018-10-23 VITALS
DIASTOLIC BLOOD PRESSURE: 83 MMHG | SYSTOLIC BLOOD PRESSURE: 153 MMHG | OXYGEN SATURATION: 95 % | HEART RATE: 70 BPM | TEMPERATURE: 98.9 F

## 2018-10-23 PROCEDURE — 3331090001 HH PPS REVENUE CREDIT

## 2018-10-23 PROCEDURE — 3331090002 HH PPS REVENUE DEBIT

## 2018-10-24 ENCOUNTER — HOME CARE VISIT (OUTPATIENT)
Dept: SCHEDULING | Facility: HOME HEALTH | Age: 83
End: 2018-10-24
Payer: MEDICARE

## 2018-10-24 PROCEDURE — G0157 HHC PT ASSISTANT EA 15: HCPCS

## 2018-10-24 PROCEDURE — 3331090002 HH PPS REVENUE DEBIT

## 2018-10-24 PROCEDURE — 3331090001 HH PPS REVENUE CREDIT

## 2018-10-25 ENCOUNTER — HOME CARE VISIT (OUTPATIENT)
Dept: SCHEDULING | Facility: HOME HEALTH | Age: 83
End: 2018-10-25
Payer: MEDICARE

## 2018-10-25 VITALS
DIASTOLIC BLOOD PRESSURE: 81 MMHG | HEART RATE: 67 BPM | TEMPERATURE: 99.1 F | OXYGEN SATURATION: 95 % | SYSTOLIC BLOOD PRESSURE: 168 MMHG

## 2018-10-25 VITALS
DIASTOLIC BLOOD PRESSURE: 64 MMHG | HEART RATE: 76 BPM | TEMPERATURE: 98.9 F | SYSTOLIC BLOOD PRESSURE: 155 MMHG | OXYGEN SATURATION: 96 %

## 2018-10-25 PROCEDURE — 3331090001 HH PPS REVENUE CREDIT

## 2018-10-25 PROCEDURE — G0152 HHCP-SERV OF OT,EA 15 MIN: HCPCS

## 2018-10-25 PROCEDURE — 3331090002 HH PPS REVENUE DEBIT

## 2018-10-26 ENCOUNTER — HOME CARE VISIT (OUTPATIENT)
Dept: SCHEDULING | Facility: HOME HEALTH | Age: 83
End: 2018-10-26
Payer: MEDICARE

## 2018-10-26 PROCEDURE — G0299 HHS/HOSPICE OF RN EA 15 MIN: HCPCS

## 2018-10-26 PROCEDURE — 3331090002 HH PPS REVENUE DEBIT

## 2018-10-26 PROCEDURE — 3331090001 HH PPS REVENUE CREDIT

## 2018-10-27 VITALS
OXYGEN SATURATION: 97 % | TEMPERATURE: 98.4 F | HEART RATE: 68 BPM | DIASTOLIC BLOOD PRESSURE: 60 MMHG | RESPIRATION RATE: 18 BRPM | SYSTOLIC BLOOD PRESSURE: 140 MMHG

## 2018-10-27 PROCEDURE — 3331090002 HH PPS REVENUE DEBIT

## 2018-10-27 PROCEDURE — 3331090001 HH PPS REVENUE CREDIT

## 2018-10-28 PROCEDURE — 3331090002 HH PPS REVENUE DEBIT

## 2018-10-28 PROCEDURE — 3331090001 HH PPS REVENUE CREDIT

## 2018-10-29 ENCOUNTER — HOME CARE VISIT (OUTPATIENT)
Dept: SCHEDULING | Facility: HOME HEALTH | Age: 83
End: 2018-10-29
Payer: MEDICARE

## 2018-10-29 PROCEDURE — 3331090002 HH PPS REVENUE DEBIT

## 2018-10-29 PROCEDURE — G0157 HHC PT ASSISTANT EA 15: HCPCS

## 2018-10-29 PROCEDURE — 3331090001 HH PPS REVENUE CREDIT

## 2018-10-30 VITALS
HEART RATE: 66 BPM | OXYGEN SATURATION: 98 % | SYSTOLIC BLOOD PRESSURE: 120 MMHG | DIASTOLIC BLOOD PRESSURE: 60 MMHG | TEMPERATURE: 98.4 F

## 2018-10-30 PROCEDURE — 3331090001 HH PPS REVENUE CREDIT

## 2018-10-30 PROCEDURE — 3331090002 HH PPS REVENUE DEBIT

## 2018-10-31 ENCOUNTER — HOME CARE VISIT (OUTPATIENT)
Dept: SCHEDULING | Facility: HOME HEALTH | Age: 83
End: 2018-10-31
Payer: MEDICARE

## 2018-10-31 VITALS
HEART RATE: 77 BPM | OXYGEN SATURATION: 96 % | TEMPERATURE: 98.6 F | DIASTOLIC BLOOD PRESSURE: 60 MMHG | SYSTOLIC BLOOD PRESSURE: 124 MMHG

## 2018-10-31 PROCEDURE — G0151 HHCP-SERV OF PT,EA 15 MIN: HCPCS

## 2018-10-31 PROCEDURE — 3331090001 HH PPS REVENUE CREDIT

## 2018-10-31 PROCEDURE — 3331090002 HH PPS REVENUE DEBIT

## 2018-11-01 PROCEDURE — 3331090001 HH PPS REVENUE CREDIT

## 2018-11-01 PROCEDURE — 3331090002 HH PPS REVENUE DEBIT

## 2018-11-02 ENCOUNTER — HOME CARE VISIT (OUTPATIENT)
Dept: SCHEDULING | Facility: HOME HEALTH | Age: 83
End: 2018-11-02
Payer: MEDICARE

## 2018-11-02 PROCEDURE — 3331090002 HH PPS REVENUE DEBIT

## 2018-11-02 PROCEDURE — G0299 HHS/HOSPICE OF RN EA 15 MIN: HCPCS

## 2018-11-02 PROCEDURE — 3331090001 HH PPS REVENUE CREDIT

## 2018-11-03 VITALS
OXYGEN SATURATION: 98 % | DIASTOLIC BLOOD PRESSURE: 70 MMHG | SYSTOLIC BLOOD PRESSURE: 130 MMHG | RESPIRATION RATE: 20 BRPM | HEART RATE: 71 BPM | TEMPERATURE: 97.7 F

## 2018-11-03 PROCEDURE — 3331090001 HH PPS REVENUE CREDIT

## 2018-11-03 PROCEDURE — 3331090002 HH PPS REVENUE DEBIT

## 2018-11-04 PROCEDURE — 3331090002 HH PPS REVENUE DEBIT

## 2018-11-04 PROCEDURE — 3331090001 HH PPS REVENUE CREDIT

## 2018-11-05 PROCEDURE — 3331090001 HH PPS REVENUE CREDIT

## 2018-11-05 PROCEDURE — 3331090002 HH PPS REVENUE DEBIT

## 2019-04-07 ENCOUNTER — HOSPITAL ENCOUNTER (EMERGENCY)
Dept: CT IMAGING | Age: 84
Discharge: HOME | End: 2019-04-07
Attending: PHYSICIAN ASSISTANT
Payer: MEDICARE

## 2019-04-07 ENCOUNTER — APPOINTMENT (OUTPATIENT)
Dept: GENERAL RADIOLOGY | Age: 84
End: 2019-04-07
Attending: PHYSICIAN ASSISTANT
Payer: MEDICARE

## 2019-04-07 ENCOUNTER — HOSPITAL ENCOUNTER (EMERGENCY)
Age: 84
Discharge: HOME | End: 2019-04-07
Attending: EMERGENCY MEDICINE
Payer: MEDICARE

## 2019-04-07 VITALS
DIASTOLIC BLOOD PRESSURE: 65 MMHG | BODY MASS INDEX: 21.08 KG/M2 | RESPIRATION RATE: 15 BRPM | TEMPERATURE: 98.5 F | OXYGEN SATURATION: 93 % | WEIGHT: 119 LBS | SYSTOLIC BLOOD PRESSURE: 163 MMHG | HEART RATE: 78 BPM

## 2019-04-07 DIAGNOSIS — T07.XXXA MULTIPLE CONTUSIONS: ICD-10-CM
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
## 2019-04-07 DIAGNOSIS — N39.0 URINARY TRACT INFECTION WITHOUT HEMATURIA, SITE UNSPECIFIED: ICD-10-CM

## 2019-04-07 LAB
ALBUMIN SERPL-MCNC: 3.6 G/DL (ref 3.4–5)
ALBUMIN/GLOB SERPL: 1 {RATIO} (ref 0.8–1.7)
ALP SERPL-CCNC: 91 U/L (ref 45–117)
ALT SERPL-CCNC: 32 U/L (ref 13–56)
ANION GAP SERPL CALC-SCNC: 7 MMOL/L (ref 3–18)
APPEARANCE UR: ABNORMAL
AST SERPL-CCNC: 24 U/L (ref 15–37)
BACTERIA URNS QL MICRO: ABNORMAL /HPF
BASOPHILS # BLD: 0 K/UL (ref 0–0.1)
BASOPHILS NFR BLD: 0 % (ref 0–2)
BILIRUB SERPL-MCNC: 0.8 MG/DL (ref 0.2–1)
BILIRUB UR QL: NEGATIVE
BUN SERPL-MCNC: 41 MG/DL (ref 7–18)
BUN/CREAT SERPL: 35 (ref 12–20)
CALCIUM SERPL-MCNC: 8.4 MG/DL (ref 8.5–10.1)
CHLORIDE SERPL-SCNC: 104 MMOL/L (ref 100–108)
CK MB CFR SERPL CALC: 2 % (ref 0–4)
CK MB CFR SERPL CALC: 2.1 % (ref 0–4)
CK MB SERPL-MCNC: 1.2 NG/ML (ref 5–25)
CK MB SERPL-MCNC: 1.5 NG/ML (ref 5–25)
CK SERPL-CCNC: 60 U/L (ref 26–192)
CK SERPL-CCNC: 73 U/L (ref 26–192)
CO2 SERPL-SCNC: 28 MMOL/L (ref 21–32)
COLOR UR: YELLOW
CREAT SERPL-MCNC: 1.17 MG/DL (ref 0.6–1.3)
DIFFERENTIAL METHOD BLD: ABNORMAL
EOSINOPHIL # BLD: 0.1 K/UL (ref 0–0.4)
EOSINOPHIL NFR BLD: 1 % (ref 0–5)
EPITH CASTS URNS QL MICRO: ABNORMAL /LPF (ref 0–5)
ERYTHROCYTE [DISTWIDTH] IN BLOOD BY AUTOMATED COUNT: 13.5 % (ref 11.6–14.5)
GLOBULIN SER CALC-MCNC: 3.7 G/DL (ref 2–4)
GLUCOSE SERPL-MCNC: 103 MG/DL (ref 74–99)
GLUCOSE UR STRIP.AUTO-MCNC: NEGATIVE MG/DL
HCT VFR BLD AUTO: 37.3 % (ref 35–45)
HGB BLD-MCNC: 11.8 G/DL (ref 12–16)
HGB UR QL STRIP: ABNORMAL
KETONES UR QL STRIP.AUTO: NEGATIVE MG/DL
LEUKOCYTE ESTERASE UR QL STRIP.AUTO: ABNORMAL
LYMPHOCYTES # BLD: 0.9 K/UL (ref 0.9–3.6)
LYMPHOCYTES NFR BLD: 7 % (ref 21–52)
MAGNESIUM SERPL-MCNC: 2.3 MG/DL (ref 1.6–2.6)
MCH RBC QN AUTO: 29.1 PG (ref 24–34)
MCHC RBC AUTO-ENTMCNC: 31.6 G/DL (ref 31–37)
MCV RBC AUTO: 91.9 FL (ref 74–97)
MONOCYTES # BLD: 0.6 K/UL (ref 0.05–1.2)
MONOCYTES NFR BLD: 5 % (ref 3–10)
NEUTS SEG # BLD: 10.6 K/UL (ref 1.8–8)
NEUTS SEG NFR BLD: 87 % (ref 40–73)
NITRITE UR QL STRIP.AUTO: NEGATIVE
PH UR STRIP: 5.5 [PH] (ref 5–8)
PLATELET # BLD AUTO: 230 K/UL (ref 135–420)
PMV BLD AUTO: 9.7 FL (ref 9.2–11.8)
POTASSIUM SERPL-SCNC: 4.4 MMOL/L (ref 3.5–5.5)
PROT SERPL-MCNC: 7.3 G/DL (ref 6.4–8.2)
PROT UR STRIP-MCNC: 30 MG/DL
RBC # BLD AUTO: 4.06 M/UL (ref 4.2–5.3)
RBC #/AREA URNS HPF: ABNORMAL /HPF (ref 0–5)
SODIUM SERPL-SCNC: 139 MMOL/L (ref 136–145)
SP GR UR REFRACTOMETRY: 1.01 (ref 1–1.03)
TROPONIN I SERPL-MCNC: <0.02 NG/ML (ref 0–0.04)
TROPONIN I SERPL-MCNC: <0.02 NG/ML (ref 0–0.04)
UROBILINOGEN UR QL STRIP.AUTO: 0.2 EU/DL (ref 0.2–1)
WBC # BLD AUTO: 12.2 K/UL (ref 4.6–13.2)
WBC URNS QL MICRO: ABNORMAL /HPF (ref 0–4)

## 2019-04-07 PROCEDURE — 87077 CULTURE AEROBIC IDENTIFY: CPT

## 2019-04-07 PROCEDURE — 72125 CT NECK SPINE W/O DYE: CPT

## 2019-04-07 PROCEDURE — 96374 THER/PROPH/DIAG INJ IV PUSH: CPT

## 2019-04-07 PROCEDURE — 90471 IMMUNIZATION ADMIN: CPT

## 2019-04-07 PROCEDURE — 84484 ASSAY OF TROPONIN QUANT: CPT

## 2019-04-07 PROCEDURE — 80053 COMPREHEN METABOLIC PANEL: CPT

## 2019-04-07 PROCEDURE — 99285 EMERGENCY DEPT VISIT HI MDM: CPT

## 2019-04-07 PROCEDURE — 87086 URINE CULTURE/COLONY COUNT: CPT

## 2019-04-07 PROCEDURE — 81001 URINALYSIS AUTO W/SCOPE: CPT

## 2019-04-07 PROCEDURE — 70450 CT HEAD/BRAIN W/O DYE: CPT

## 2019-04-07 PROCEDURE — 74011250636 HC RX REV CODE- 250/636: Performed by: PHYSICIAN ASSISTANT

## 2019-04-07 PROCEDURE — 87186 SC STD MICRODIL/AGAR DIL: CPT

## 2019-04-07 PROCEDURE — 72192 CT PELVIS W/O DYE: CPT

## 2019-04-07 PROCEDURE — 93005 ELECTROCARDIOGRAM TRACING: CPT

## 2019-04-07 PROCEDURE — 74011250637 HC RX REV CODE- 250/637: Performed by: PHYSICIAN ASSISTANT

## 2019-04-07 PROCEDURE — 90715 TDAP VACCINE 7 YRS/> IM: CPT | Performed by: PHYSICIAN ASSISTANT

## 2019-04-07 PROCEDURE — 85025 COMPLETE CBC W/AUTO DIFF WBC: CPT

## 2019-04-07 PROCEDURE — 83735 ASSAY OF MAGNESIUM: CPT

## 2019-04-07 RX ORDER — ACETAMINOPHEN 325 MG/1
650 TABLET ORAL
Status: COMPLETED | OUTPATIENT
Start: 2019-04-07 | End: 2019-04-07

## 2019-04-07 RX ORDER — CEPHALEXIN 500 MG/1
500 CAPSULE ORAL 2 TIMES DAILY
Qty: 10 CAP | Refills: 0 | Status: SHIPPED | OUTPATIENT
Start: 2019-04-07 | End: 2019-04-12

## 2019-04-07 RX ORDER — CEFTRIAXONE 1 G/1
1 INJECTION, POWDER, FOR SOLUTION INTRAMUSCULAR; INTRAVENOUS
Status: COMPLETED | OUTPATIENT
Start: 2019-04-07 | End: 2019-04-07

## 2019-04-07 RX ADMIN — ACETAMINOPHEN 650 MG: 325 TABLET, FILM COATED ORAL at 18:28

## 2019-04-07 RX ADMIN — TETANUS TOXOID, REDUCED DIPHTHERIA TOXOID AND ACELLULAR PERTUSSIS VACCINE, ADSORBED 0.5 ML: 5; 2.5; 8; 8; 2.5 SUSPENSION INTRAMUSCULAR at 22:18

## 2019-04-07 RX ADMIN — CEFTRIAXONE 1 G: 1 INJECTION, POWDER, FOR SOLUTION INTRAMUSCULAR; INTRAVENOUS at 22:15

## 2019-04-07 NOTE — ED TRIAGE NOTES
Patient arrived by EMS with chief c/o syncopal episode at home. Patient states she was doing puzzle all day and when she stood up she got dizzy and had syncopal episode. Patient alert and oriented X4 upon arrival. Patient obtained skin tear to left forearm and small hematoma to back left of head from fall, patient on eliquis. BG by EMS [phone]

## 2019-04-08 LAB
ATRIAL RATE: 57 BPM
CALCULATED P AXIS, ECG09: 68 DEGREES
CALCULATED R AXIS, ECG10: 59 DEGREES
CALCULATED T AXIS, ECG11: 68 DEGREES
DIAGNOSIS, 93000: NORMAL
P-R INTERVAL, ECG05: 248 MS
Q-T INTERVAL, ECG07: 412 MS
QRS DURATION, ECG06: 80 MS
QTC CALCULATION (BEZET), ECG08: 401 MS
VENTRICULAR RATE, ECG03: 57 BPM

## 2019-04-08 NOTE — ED NOTES
I received a call from the radiologist this morning regarding patient CT scan. Patient CT scan was done for rule out any hip fracture There is no acute hip fracture There is sacral fracture that is mentioned in lower sacrum. I discussed that with Dr. Avon Phoenix as well. I will call patient's PCP Dr. Dorothy Mayes and explained the findings as well and have them follow-up with this patient's care. CT Results  (Last 48 hours) 04/07/19 1841  CT HEAD WO CONT Final result Impression:  IMPRESSION:  
   
1. No CT evidence of an acute intracranial abnormality - in particular, no  
acute cortical infarct, hemorrhage, or mass effect. 2.  Mild cerebral atrophy/volume loss and periventricular white matter changes,  
most commonly seen with chronic microvascular disease. Initial preliminary report was provided to the ED by the on-call radiology  
resident. Narrative:  EXAM: CT HEAD, WITHOUT IV CONTRAST  
   
COMPARISON: 12/18/2017 INDICATION: Syncopal episode and dizziness with fall hitting head, persistent  
posterior head pain TECHNIQUE: Multiple axial CT images of the head were obtained extending from the  
skull base through the vertex. Additional coronal and sagittal reformations were  
also performed. One or more dose reduction techniques were used on this CT:  
automated exposure control, adjustment of the mAs and/or kVp according to  
patient size, and iterative reconstruction techniques. The specific techniques  
used on this CT exam have been documented in the patient's electronic medical  
record. Digital Imaging and Communications in Medicine (DICOM) format image  
data are available to nonaffiliated external healthcare facilities or entities  
on a secure, media free, reciprocally searchable basis with patient authorization for at least a 12-month period after this study. _______________ FINDINGS:  
   
 BRAIN AND POSTERIOR FOSSA: There is generalized prominence of the ventricular  
system, associated with proportional widening of the cortical cerebral sulci,  
compatible with generalized volume loss. Hazy hypoattenuation identified along  
the periventricular white matter, a nonspecific finding which is most commonly  
encountered in the setting of chronic microvascular disease. There is no  
intracranial hemorrhage, mass effect, or midline shift. There are no  
significant additional areas of abnormal parenchymal attenuation. EXTRA-AXIAL SPACES AND MENINGES: There are no abnormal extra-axial fluid  
collections. CALVARIUM: No acute osseous abnormality. Hyperostosis frontalis interna. OTHER: The visualized portions of the paranasal sinuses and mastoid air cells  
are clear.  
   
_______________  
   
  
 04/07/19 1841  CT SPINE CERV WO CONT Final result Impression:  IMPRESSION:  
   
1. No fracture or dislocation. 2. Multilevel moderate severe degenerative changes with multilevel bilateral  
severe foraminal encroachment. No significant change since prior CT scan of the  
cervical spine from 8/2/2015. Please note that this CT was performed supine. It is highly sensitive for  
fractures and dislocations but does not evaluate for ligamentous injury  
including significant instability. If the patient's symptoms persist or if  
otherwise clinically indicated, in erect plain film evaluation of the cervical  
spine is suggested. Note: Preliminary report was provided by the on-call radiology resident. Narrative:  CT CERVICAL SPINE History: Neck pain after fall hitting back of head Comparison: CT cervical spine from 8-15 Technique:  A helically acquired noncontrast CT scan of the cervical spine from  
the base of the skull through T1 was performed. Axial and coronal reformations  
were also provided for improved anatomic evaluation. One or more dose reduction techniques were used on this CT: automated exposure  
control, adjustment of the mAs and/or kVp according to patient size, and  
iterative reconstruction techniques. The specific techniques used on this CT  
exam have been documented in the patient's electronic medical record. Digital  
Imaging and Communications in Medicine (DICOM) format image data are available  
to nonaffiliated external healthcare facilities or entities on a secure, media  
free, reciprocally searchable basis with patient authorization for at least a  
12-month period after this study. ______________________ FINDINGS:  
   
VERTEBRAE AND DISCS: Vertebral body heights are preserved with advanced  
degenerative changes of the atlantoaxial junction, as before. No findings of  
vertebral body fracture. Multilevel to degenerative disc disease with calcific  
changes from C2 to C5. Hypertrophic dorsal disc osteophyte at C3-4, C4-5 and C5-6 as before. Multilevel advanced facet hypertrophic changes with ankylosis. No displaced fractures are identified. There are no significant areas of bone  
lucency or sclerosis. No findings of listhesis. SPINAL CANAL AND FORAMINA: Multilevel chronic central stenosis, greatest at C5-6  
measuring 6 mm in AP dimension. Multilevel bilateral severe degenerative  
foraminal encroachment from uncovertebral spurring and facet hypertrophy. PREVERTEBRAL SOFT TISSUES: Normal.  
   
VISIBLE PORTIONS OF POSTERIOR FOSSA/BRAIN: Please see same day CT scan today for  
discussion of the posterior fossa. LUNG APICES: Biapical pleural-parenchymal scarring. OTHER: Thyromegaly as before.  
__________________  
  
 04/07/19 1841  CT PELV WO CONT Final result Impression:  IMPRESSION:  
   
1. Acute appearing lower sacral fracture, S4/S5 region, with adjacent presacral  
stranding/contusion. 2. No acute hip fracture is identified. Stable quite advanced bilateral hip degenerative changes are present with prominent heterotopic ossification, right  
greater than left. 3. Stable severe L3-4 and L4-5 central stenosis. CT is approximately 86% sensitive at evaluating acute nondisplaced hip  
fractures. When CT detects no acute fracture and there is persistent clinical  
suspicion of an occult fracture, MRI is useful for more accurate and sensitive  
evaluation. Preliminary report provided by on-call radiology resident. These additional  
findings of acute appearing lower sacral fracture, not described on initial  
resident interpretation, were directly communicated to Dr. Bertha Natarajan on 4/8/2019  
9:00 AM.  Results understood and acknowledged. Narrative:  EXAM: CT PELVIS  
   
INDICATION: Fall, pain, possible fracture COMPARISON: CT abdomen pelvis 4/9/2018 TECHNIQUE: Multiple axial CT images of the pelvis were obtained extending from  
the iliac crests to the lesser trochanters without contrast.  Additional  
coronal, oblique sagittal, and sagittal reformations were performed. One or more dose reduction techniques were used on this CT: automated exposure  
control, adjustment of the mAs and/or kVp according to patient's size, and  
iterative reconstruction techniques. The specific techniques utilized on this CT  
exam have been documented in the patient's electronic medical record. Digital  
Imaging and Communications in Medicine (DICOM) format image data are available  
to nonaffiliated external healthcare facilities or entities on a secure, media  
free, reciprocally searchable basis with patient authorization for at least a  
12-month period after this study. _______________ FINDINGS:  
   
LYMPH NODES: No enlarged lymph nodes by size criteria. GASTROINTESTINAL TRACT: No abnormal bowel dilation or wall thickening. Visualized small bowel and colon is within normal limits. No free fluid or fluid  
collection is seen. PELVIC ORGANS: Unremarkable. VASCULATURE: Atherosclerotic calcifications are present. OSSEOUS: Severe right hip joint space narrowing is present with relative  
bone-on-bone morphology with femoral head and acetabular remodeling. Prominent  
hypertrophic changes are present along with adjacent areas of heterotopic  
ossification. Subchondral sclerosis and subchondral cyst formation is again  
probably seen along this right hip joint. No definite acute fracture line is  
identified. There is no right hip dislocation. Advanced but milder degenerative changes are present along the contralateral  
left hip joint as well. Additional heterotopic ossification is present at the  
left hip joint with suggestion of capsular calcifications. No acute fracture  
line is identified. Buckled morphology of the sacrum is present including cortical disruption  
inferiorly, which appears to be located along the S4/S5 junction such as  
sagittal image 66. This fracture is acute appearing. Adjacent presacral moderate  
stranding of mildly increased attenuation is present which is new. No definite  
sacral fracture is seen more superiorly. Prominent facet degeneration is seen in visualized lower lumbar spine with  
severe L3-4 and L4-5 central stenosis, unchanged. OTHER: None.  
   
_______________ I discussed this information with Dr. Sukhdev Vivar. He agrees with the conservative management states he will follow-up with the patient.

## 2019-04-08 NOTE — DISCHARGE INSTRUCTIONS

## 2019-04-08 NOTE — ED PROVIDER NOTES
[facility] EMERGENCY DEPT 
 
 
10:12 PM 
 
Date: 4/7/2019 Patient Name: [facility] ERNESTINE History of Presenting Illness Chief Complaint Patient presents with  Syncope 80 y.o. 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  Patient states that she had been doing a puzzle all day and had not eaten since the morning, when she stood up and walked to the kitchen she fell over. Patient states that she does not think she got dizzy, lightheaded, or had LOC. States she fell on the floor striking her low back and her posterior head. Notes pressing her medical alert button which is around her neck which alerted the paramedics. She is now having pain to her bilateral hips, low back, and posterior head. She has not taken anything for pain. States she lives alone at home. Denies any chest pain, shortness of breath, dizziness, lightheadedness, numbness, weakness, slurred speech, abdominal pain, or other symptoms at this time. Nursing notes regarding the HPI and triage nursing notes were reviewed. Prior medical records were reviewed. Current Facility-Administered Medications Medication Dose Route Frequency Provider Last Rate Last Dose  cefTRIAXone (ROCEPHIN) injection 1 g  1 g IntraVENous NOW Katey Shea PA  diph, Pertuss(AC)Nevaeh Vac-PF (BOOSTRIX) suspension 0.5 mL  0.5 mL IntraMUSCular PRIOR TO DISCHARGE JANETT Membreno Current Outpatient Medications Medication Sig Dispense Refill  cephALEXin (KEFLEX) 500 mg capsule Take 1 Cap by mouth two (2) times a day for 5 days. 10 Cap 0  
 ergocalciferol (ERGOCALCIFEROL) 50,000 unit capsule Take 50,000 Units by mouth Once every 2 weeks.  docusate sodium (STOOL SOFTENER) 100 mg capsule Take 100 mg by mouth daily as needed for Constipation.  apixaban (ELIQUIS) 2.5 mg tablet Take 1 Tab by mouth two (2) times a day. 180 Tab 3  
 dilTIAZem CD (CARDIZEM CD) 180 mg ER capsule Take 1 Cap by mouth daily. (Patient taking differently: Take 240 mg by mouth daily.) 90 Cap 3  
 metoprolol tartrate (LOPRESSOR) 25 mg tablet Take 1 Tab by mouth every twelve (12) hours. Indications: hypertension 180 Tab 2  
 nitroglycerin (NITROSTAT) 0.4 mg SL tablet 1 Tab by SubLINGual route every five (5) minutes as needed for Chest Pain. (Patient taking differently: 1 Tab by SubLINGual route every five (5) minutes as needed for Chest Pain. 3 doses then call 911) 30 Tab 2  
 traMADol (ULTRAM) 50 mg tablet Take 1 Tab by mouth every eight (8) hours as needed for Pain. Max Daily Amount: 150 mg. 12 Tab 0  
 acetaminophen (TYLENOL EXTRA STRENGTH) 500 mg tablet Take 2 Tabs by mouth every six (6) hours as needed for Pain. 50 Tab 0  
 atorvastatin (LIPITOR) 40 mg tablet Take 1 Tab by mouth nightly. 30 Tab 0  
 polyethylene glycol (MIRALAX) 17 gram packet Take 1 Packet by mouth daily. 30 Packet 0  
 pantoprazole (PROTONIX) 40 mg tablet Take 40 mg by mouth daily. Indications: GASTROESOPHAGEAL REFLUX Past History Past Medical History: 
Past Medical History:  
Diagnosis Date  Coronary artery disease  Degenerative joint disease  Degenerative spine disease  Dyslipidemia  Hypertension  Hypertension  Kidney stone  Osteoarthritis Past Surgical History: 
Past Surgical History:  
Procedure Laterality Date  HX APPENDECTOMY  HX HYSTERECTOMY Family History: 
Family History Family history unknown: Yes  
 
 
Social History: 
Social History Tobacco Use  Smoking status: Never Smoker  Smokeless tobacco: Never Used Substance Use Topics  Alcohol use: No  
 Drug use: No  
 
 
Allergies: Allergies Allergen Reactions  Demerol [Meperidine] Other (comments) Oral pain  Erythromycin Other (comments)  Nitrofurantoin Other (comments) Mouth pain  Pcn [Penicillins] Other (comments) Oral pain  Sulfa (Sulfonamide Antibiotics) Other (comments) Oral pain Patient's primary care provider (as noted in EPIC):  Summer Mahan MD 
 
Review of Systems Constitutional:  Denies malaise, fever, chills. Head:  + injury. Face:  Denies injury or pain. Neck:  Denies injury or pain. Chest:  Denies injury. Cardiac:  Denies chest pain or palpitations. Respiratory:  Denies shortness of breath. GI/ABD:  Denies nausea, vomiting. Back:  + pain. Pelvis:  Denies injury or pain. Extremity/MS:  + bilateral hip pain. Neuro: + Headache. Denies LOC, dizziness, neurologic symptoms/deficits/paresthesias. Skin: Denies injury, rash, itching or skin changes. All other systems negative as reviewed. Visit Vitals /65 Pulse 78 Temp 98.5 °F (36.9 °C) Resp 15 Wt 54 kg (119 lb) SpO2 93% BMI 21.08 kg/m² PHYSICAL EXAM: 
 
CONSTITUTIONAL: Alert, in no apparent distress; well-developed; well-nourished. HEAD:  Normocephalic, atraumatic. No Battles sign. No Raccoons eyes. EYES:  PERRL. EOM's intact. Normal conjunctiva. Anicteric sclera. ENTM: Nose: no rhinorrhea; Oropharynx:  mucous membranes moist 
Neck:  No cervical vertebral bony point tenderness or step-off. RESP: Chest clear, equal breath sounds. Without wheezes, rhonchi, rales. CARDIOVASCULAR:  Regular rate and rhythm. No murmurs, rubs, or gallops. GI: Normal bowel sounds, abdomen soft and non-tender. No masses or organomegaly. : No costo-vertebral angle tenderness. BACK: Mild inferior L and S spine bony tenderness to palpation, no step-off. UPPER EXT: No deformity or tenderness. Skin tear noted to left forearm. LOWER EXT: Bilateral hips with reproducible pain with full flexion; no pain with internal and external rotation. No edema, no calf tenderness. Distal pulses intact. NEURO: Grossly normal motor and sensation. SKIN: See upper extremity PSYCH:  Alert and oriented, normal affect. DIFFERENTIAL DIAGNOSES/ MEDICAL DECISION MAKING: 
Contusion, sprain, dislocation, fracture, ligamentous tear/ disruption or a combination of the above. CT head, Cspine, pelvis: NAD Recent Results (from the past 12 hour(s)) EKG, 12 LEAD, INITIAL Collection Time: 04/07/19  5:22 PM  
Result Value Ref Range Ventricular Rate 57 BPM  
 Atrial Rate 57 BPM  
 P-R Interval 248 ms QRS Duration 80 ms  
 Q-T Interval 412 ms QTC Calculation (Bezet) 401 ms Calculated P Axis 68 degrees Calculated R Axis 59 degrees Calculated T Axis 68 degrees Diagnosis Sinus bradycardia with 1st degree AV block Otherwise normal ECG When compared with ECG of 20-SEP-2018 11:40, 
premature ventricular complexes are no longer present Nonspecific T wave abnormality no longer evident in Inferior leads CBC WITH AUTOMATED DIFF Collection Time: 04/07/19  5:50 PM  
Result Value Ref Range WBC 12.2 4.6 - 13.2 K/uL  
 RBC 4.06 (L) 4.20 - 5.30 M/uL  
 HGB 11.8 (L) 12.0 - 16.0 g/dL HCT 37.3 35.0 - 45.0 % MCV 91.9 74.0 - 97.0 FL  
 MCH 29.1 24.0 - 34.0 PG  
 MCHC 31.6 31.0 - 37.0 g/dL  
 RDW 13.5 11.6 - 14.5 % PLATELET 873 006 - 855 K/uL MPV 9.7 9.2 - 11.8 FL  
 NEUTROPHILS 87 (H) 40 - 73 % LYMPHOCYTES 7 (L) 21 - 52 % MONOCYTES 5 3 - 10 % EOSINOPHILS 1 0 - 5 % BASOPHILS 0 0 - 2 %  
 ABS. NEUTROPHILS 10.6 (H) 1.8 - 8.0 K/UL  
 ABS. LYMPHOCYTES 0.9 0.9 - 3.6 K/UL  
 ABS. MONOCYTES 0.6 0.05 - 1.2 K/UL  
 ABS. EOSINOPHILS 0.1 0.0 - 0.4 K/UL  
 ABS. BASOPHILS 0.0 0.0 - 0.1 K/UL  
 DF AUTOMATED METABOLIC PANEL, COMPREHENSIVE Collection Time: 04/07/19  5:50 PM  
Result Value Ref Range Sodium 139 136 - 145 mmol/L Potassium 4.4 3.5 - 5.5 mmol/L Chloride 104 100 - 108 mmol/L  
 CO2 28 21 - 32 mmol/L Anion gap 7 3.0 - 18 mmol/L Glucose 103 (H) 74 - 99 mg/dL BUN 41 (H) 7.0 - 18 MG/DL Creatinine 1.17 0.6 - 1.3 MG/DL  
 BUN/Creatinine ratio 35 (H) 12 - 20 GFR est AA 52 (L) >60 ml/min/1.73m2 GFR est non-AA 43 (L) >60 ml/min/1.73m2 Calcium 8.4 (L) 8.5 - 10.1 MG/DL Bilirubin, total 0.8 0.2 - 1.0 MG/DL  
 ALT (SGPT) 32 13 - 56 U/L  
 AST (SGOT) 24 15 - 37 U/L Alk. phosphatase 91 45 - 117 U/L Protein, total 7.3 6.4 - 8.2 g/dL Albumin 3.6 3.4 - 5.0 g/dL Globulin 3.7 2.0 - 4.0 g/dL A-G Ratio 1.0 0.8 - 1.7 CARDIAC PANEL,(CK, CKMB & TROPONIN) Collection Time: 04/07/19  5:50 PM  
Result Value Ref Range CK 60 26 - 192 U/L  
 CK - MB 1.2 <3.6 ng/ml CK-MB Index 2.0 0.0 - 4.0 % Troponin-I, QT <0.02 0.0 - 0.045 NG/ML  
MAGNESIUM Collection Time: 04/07/19  5:50 PM  
Result Value Ref Range Magnesium 2.3 1.6 - 2.6 mg/dL URINALYSIS W/ RFLX MICROSCOPIC Collection Time: 04/07/19  8:35 PM  
Result Value Ref Range Color YELLOW Appearance CLOUDY Specific gravity 1.009 1.005 - 1.030    
 pH (UA) 5.5 5.0 - 8.0 Protein 30 (A) NEG mg/dL Glucose NEGATIVE  NEG mg/dL Ketone NEGATIVE  NEG mg/dL Bilirubin NEGATIVE  NEG Blood SMALL (A) NEG Urobilinogen 0.2 0.2 - 1.0 EU/dL Nitrites NEGATIVE  NEG Leukocyte Esterase LARGE (A) NEG URINE MICROSCOPIC ONLY Collection Time: 04/07/19  8:35 PM  
Result Value Ref Range WBC TOO NUMEROUS TO COUNT 0 - 4 /hpf  
 RBC 0 to 2 0 - 5 /hpf Epithelial cells FEW 0 - 5 /lpf Bacteria 3+ (A) NEG /hpf CARDIAC PANEL,(CK, CKMB & TROPONIN) Collection Time: 04/07/19  9:06 PM  
Result Value Ref Range CK 73 26 - 192 U/L  
 CK - MB 1.5 <3.6 ng/ml CK-MB Index 2.1 0.0 - 4.0 % Troponin-I, QT <0.02 0.0 - 0.045 NG/ML  
  
ED COURSE:   
 
CT, head, neck, pelvis without acute process. 2 sets of cardiac enzymes unremarkable. Patient does appear to have a UTI. Will give a dose of Rocephin here and sent home with Keflex. Urine culture was ordered. Patient may follow-up with her primary care doctor. She may take Tylenol at home for her pain. IMPRESSION AND MEDICAL DECISION MAKING: 
Based upon the patients presentation with noted HPI and PE, along with the work up done in the emergency department, I believe that the patient is having noted contusion(s) from noted fall. Diagnosis: 1. Fall, initial encounter 2. Urinary tract infection without hematuria, site unspecified 3. Multiple contusions Disposition: Discharge Follow-up Information Follow up With Specialties Details Why Contact Info Carlos Farah MD Internal Medicine In 2 days  [address] [id number] 
[phone] [facility] EMERGENCY DEPT Emergency Medicine  If symptoms worsen [address] 
[phone] Patient's Medications Start Taking CEPHALEXIN (KEFLEX) 500 MG CAPSULE    Take 1 Cap by mouth two (2) times a day for 5 days. Continue Taking ACETAMINOPHEN (TYLENOL EXTRA STRENGTH) 500 MG TABLET    Take 2 Tabs by mouth every six (6) hours as needed for Pain. APIXABAN (ELIQUIS) 2.5 MG TABLET    Take 1 Tab by mouth two (2) times a day. ATORVASTATIN (LIPITOR) 40 MG TABLET    Take 1 Tab by mouth nightly. DILTIAZEM CD (CARDIZEM CD) 180 MG ER CAPSULE    Take 1 Cap by mouth daily. DOCUSATE SODIUM (STOOL SOFTENER) 100 MG CAPSULE    Take 100 mg by mouth daily as needed for Constipation. ERGOCALCIFEROL (ERGOCALCIFEROL) 50,000 UNIT CAPSULE    Take 50,000 Units by mouth Once every 2 weeks. METOPROLOL TARTRATE (LOPRESSOR) 25 MG TABLET    Take 1 Tab by mouth every twelve (12) hours. Indications: hypertension NITROGLYCERIN (NITROSTAT) 0.4 MG SL TABLET    1 Tab by SubLINGual route every five (5) minutes as needed for Chest Pain. PANTOPRAZOLE (PROTONIX) 40 MG TABLET    Take 40 mg by mouth daily. Indications: GASTROESOPHAGEAL REFLUX POLYETHYLENE GLYCOL (MIRALAX) 17 GRAM PACKET    Take 1 Packet by mouth daily. TRAMADOL (ULTRAM) 50 MG TABLET    Take 1 Tab by mouth every eight (8) hours as needed for Pain. Max Daily Amount: 150 mg. These Medications have changed No medications on file Stop Taking No medications on file JANETT Galvan

## 2019-04-08 NOTE — ED NOTES
I have reviewed discharge instructions with the patient. The patient verbalized understanding. Patient armband removed and shredded Patient taken to ED lobby in wheel chair.

## 2019-04-10 LAB
BACTERIA SPEC CULT: ABNORMAL
SERVICE CMNT-IMP: ABNORMAL

## 2019-04-11 ENCOUNTER — APPOINTMENT (OUTPATIENT)
Dept: CT IMAGING | Age: 84
End: 2019-04-11
Attending: EMERGENCY MEDICINE
Payer: MEDICARE

## 2019-04-11 ENCOUNTER — HOSPITAL ENCOUNTER (EMERGENCY)
Age: 84
Discharge: HOME | End: 2019-04-11
Attending: EMERGENCY MEDICINE
Payer: MEDICARE

## 2019-04-11 VITALS
OXYGEN SATURATION: 95 % | WEIGHT: 130 LBS | DIASTOLIC BLOOD PRESSURE: 56 MMHG | TEMPERATURE: 98.5 F | BODY MASS INDEX: 22.2 KG/M2 | HEART RATE: 64 BPM | SYSTOLIC BLOOD PRESSURE: 159 MMHG | HEIGHT: 64 IN | RESPIRATION RATE: 18 BRPM

## 2019-04-11 DIAGNOSIS — Z79.01 ANTICOAGULATED: ICD-10-CM

## 2019-04-11 DIAGNOSIS — S32.10XD CLOSED FRACTURE OF SACRUM WITH ROUTINE HEALING, UNSPECIFIED FRACTURE MORPHOLOGY, SUBSEQUENT ENCOUNTER: Primary | ICD-10-CM

## 2019-04-11 LAB
ABO + RH BLD: NORMAL
ATRIAL RATE: 60 BPM
BASOPHILS # BLD: 0 K/UL (ref 0–0.1)
BASOPHILS NFR BLD: 0 % (ref 0–2)
BLOOD GROUP ANTIBODIES SERPL: NORMAL
CALCULATED P AXIS, ECG09: -11 DEGREES
CALCULATED R AXIS, ECG10: 19 DEGREES
CALCULATED T AXIS, ECG11: 7 DEGREES
DIAGNOSIS, 93000: NORMAL
DIFFERENTIAL METHOD BLD: ABNORMAL
EOSINOPHIL # BLD: 0 K/UL (ref 0–0.4)
EOSINOPHIL NFR BLD: 0 % (ref 0–5)
ERYTHROCYTE [DISTWIDTH] IN BLOOD BY AUTOMATED COUNT: 13.4 % (ref 11.6–14.5)
HCT VFR BLD AUTO: 33.7 % (ref 35–45)
HGB BLD-MCNC: 11.2 G/DL (ref 12–16)
INR PPP: 1.2 (ref 0.8–1.2)
LYMPHOCYTES # BLD: 0.6 K/UL (ref 0.9–3.6)
LYMPHOCYTES NFR BLD: 6 % (ref 21–52)
MCH RBC QN AUTO: 30.1 PG (ref 24–34)
MCHC RBC AUTO-ENTMCNC: 33.2 G/DL (ref 31–37)
MCV RBC AUTO: 90.6 FL (ref 74–97)
MONOCYTES # BLD: 0.7 K/UL (ref 0.05–1.2)
MONOCYTES NFR BLD: 7 % (ref 3–10)
NEUTS SEG # BLD: 8.2 K/UL (ref 1.8–8)
NEUTS SEG NFR BLD: 87 % (ref 40–73)
P-R INTERVAL, ECG05: 212 MS
PLATELET # BLD AUTO: 173 K/UL (ref 135–420)
PMV BLD AUTO: 10.2 FL (ref 9.2–11.8)
PROTHROMBIN TIME: 15.2 SEC (ref 11.5–15.2)
Q-T INTERVAL, ECG07: 384 MS
QRS DURATION, ECG06: 86 MS
QTC CALCULATION (BEZET), ECG08: 384 MS
RBC # BLD AUTO: 3.72 M/UL (ref 4.2–5.3)
SPECIMEN EXP DATE BLD: NORMAL
VENTRICULAR RATE, ECG03: 60 BPM
WBC # BLD AUTO: 9.5 K/UL (ref 4.6–13.2)

## 2019-04-11 PROCEDURE — 72192 CT PELVIS W/O DYE: CPT

## 2019-04-11 PROCEDURE — 99284 EMERGENCY DEPT VISIT MOD MDM: CPT

## 2019-04-11 PROCEDURE — 85610 PROTHROMBIN TIME: CPT

## 2019-04-11 PROCEDURE — 86900 BLOOD TYPING SEROLOGIC ABO: CPT

## 2019-04-11 PROCEDURE — 93005 ELECTROCARDIOGRAM TRACING: CPT

## 2019-04-11 PROCEDURE — 85025 COMPLETE CBC W/AUTO DIFF WBC: CPT

## 2019-04-11 RX ORDER — TRAMADOL HYDROCHLORIDE 50 MG/1
50 TABLET ORAL
Qty: 10 TAB | Refills: 0 | Status: SHIPPED | OUTPATIENT
Start: 2019-04-11 | End: 2019-04-14

## 2019-04-11 NOTE — ED TRIAGE NOTES
Patient arrived via EMS c/o hip pain since falling Sunday. Bruising noted to left hand. Patient also complains of nausea but has not vomited

## 2019-04-11 NOTE — ED PROVIDER NOTES
EMERGENCY DEPARTMENT HISTORY AND PHYSICAL EXAM 
 
1:02 PM 
 
 
Date: 4/11/2019 Patient Name: [facility] ERNESTINE History of Presenting Illness Chief Complaint Patient presents with  Fall  Hip Pain  Nausea History Provided By: Patient and caregiver Additional History (Context): [facility] ERNESTINE is a 80 y.o. female who presents with continued pelvic pain status post a ground-level fall on Sunday patient states that every time she tries to get up and move or twist that she has increased pain denies any new fall or new trauma. States that she did have a workup on Sunday that there was a sacral fracture. Has been taking her Eliquis since then denies any dizziness any shortness of breath any chest pain any abdominal pain since that time was diagnosed with the UTI has been compliant with her antibiotics. PCP: Edi Riojas MD 
 
 
Current Outpatient Medications Medication Sig Dispense Refill  cephALEXin (KEFLEX) 500 mg capsule Take 1 Cap by mouth two (2) times a day for 5 days. 10 Cap 0  
 ergocalciferol (ERGOCALCIFEROL) 50,000 unit capsule Take 50,000 Units by mouth Once every 2 weeks.  docusate sodium (STOOL SOFTENER) 100 mg capsule Take 100 mg by mouth daily as needed for Constipation.  apixaban (ELIQUIS) 2.5 mg tablet Take 1 Tab by mouth two (2) times a day. 180 Tab 3  
 dilTIAZem CD (CARDIZEM CD) 180 mg ER capsule Take 1 Cap by mouth daily. (Patient taking differently: Take 240 mg by mouth daily.) 90 Cap 3  
 metoprolol tartrate (LOPRESSOR) 25 mg tablet Take 1 Tab by mouth every twelve (12) hours. Indications: hypertension 180 Tab 2  
 nitroglycerin (NITROSTAT) 0.4 mg SL tablet 1 Tab by SubLINGual route every five (5) minutes as needed for Chest Pain. (Patient taking differently: 1 Tab by SubLINGual route every five (5) minutes as needed for Chest Pain.  3 doses then call 911) 30 Tab 2  
 traMADol (ULTRAM) 50 mg tablet Take 1 Tab by mouth every eight (8) hours as needed for Pain. Max Daily Amount: 150 mg. 12 Tab 0  
 acetaminophen (TYLENOL EXTRA STRENGTH) 500 mg tablet Take 2 Tabs by mouth every six (6) hours as needed for Pain. 50 Tab 0  
 atorvastatin (LIPITOR) 40 mg tablet Take 1 Tab by mouth nightly. 30 Tab 0  
 polyethylene glycol (MIRALAX) 17 gram packet Take 1 Packet by mouth daily. 30 Packet 0  
 pantoprazole (PROTONIX) 40 mg tablet Take 40 mg by mouth daily. Indications: GASTROESOPHAGEAL REFLUX Past History Past Medical History: 
Past Medical History:  
Diagnosis Date  Coronary artery disease  Degenerative joint disease  Degenerative spine disease  Dyslipidemia  Hypertension  Hypertension  Kidney stone  Osteoarthritis Past Surgical History: 
Past Surgical History:  
Procedure Laterality Date  HX APPENDECTOMY  HX HYSTERECTOMY Family History: 
Family History Family history unknown: Yes  
 
 
Social History: 
Social History Tobacco Use  Smoking status: Never Smoker  Smokeless tobacco: Never Used Substance Use Topics  Alcohol use: No  
 Drug use: No  
 
 
Allergies: Allergies Allergen Reactions  Demerol [Meperidine] Other (comments) Oral pain  Erythromycin Other (comments)  Nitrofurantoin Other (comments) Mouth pain  Pcn [Penicillins] Other (comments) Oral pain  Sulfa (Sulfonamide Antibiotics) Other (comments) Oral pain Review of Systems Review of Systems Constitutional: Positive for activity change. Negative for fatigue and fever. Cardiovascular: Negative for chest pain and palpitations. Gastrointestinal: Negative for abdominal pain, blood in stool, nausea and vomiting. Genitourinary: Negative for hematuria. Neurological: Negative for dizziness and light-headedness. Hematological: Bruises/bleeds easily. Physical Exam  
 
Visit Vitals /61 Pulse 62 Temp 98.5 °F (36.9 °C) Resp 18 Ht 5' 4\" (1.626 m) Wt 59 kg (130 lb) SpO2 97% BMI 22.31 kg/m² Physical Exam  
Constitutional: She is oriented to person, place, and time. She appears well-developed and well-nourished. No distress. HENT:  
Head: Normocephalic and atraumatic. Mouth/Throat: Oropharynx is clear and moist.  
Eyes: Pupils are equal, round, and reactive to light. Conjunctivae and EOM are normal. No scleral icterus. Neck: Normal range of motion. Neck supple. Cardiovascular: Normal rate, regular rhythm and normal heart sounds. No murmur heard. Pulmonary/Chest: Effort normal and breath sounds normal. No respiratory distress. Abdominal: Soft. Bowel sounds are normal. She exhibits no distension. There is no tenderness. Tenderness over the pelvic symphysis as well as the sacrum no abdominal tenderness no CVA tenderness no ecchymosis noted Musculoskeletal: She exhibits no edema. Lymphadenopathy:  
  She has no cervical adenopathy. Neurological: She is alert and oriented to person, place, and time. Coordination normal.  
Skin: Skin is warm and dry. No rash noted. Psychiatric: She has a normal mood and affect. Her behavior is normal.  
Nursing note and vitals reviewed. Diagnostic Study Results Labs - Recent Results (from the past 12 hour(s)) EKG, 12 LEAD, INITIAL Collection Time: 04/11/19 11:59 AM  
Result Value Ref Range Ventricular Rate 60 BPM  
 Atrial Rate 60 BPM  
 P-R Interval 212 ms QRS Duration 86 ms  
 Q-T Interval 384 ms QTC Calculation (Bezet) 384 ms Calculated P Axis -11 degrees Calculated R Axis 19 degrees Calculated T Axis 7 degrees Diagnosis Sinus rhythm with 1st degree AV block Otherwise normal ECG When compared with ECG of 07-APR-2019 17:22, 
T wave inversion now evident in Inferior leads CBC WITH AUTOMATED DIFF Collection Time: 04/11/19  1:20 PM  
Result Value Ref Range WBC 9.5 4.6 - 13.2 K/uL  
 RBC 3.72 (L) 4.20 - 5.30 M/uL HGB 11.2 (L) 12.0 - 16.0 g/dL HCT 33.7 (L) 35.0 - 45.0 % MCV 90.6 74.0 - 97.0 FL  
 MCH 30.1 24.0 - 34.0 PG  
 MCHC 33.2 31.0 - 37.0 g/dL  
 RDW 13.4 11.6 - 14.5 % PLATELET 686 567 - 958 K/uL MPV 10.2 9.2 - 11.8 FL  
 NEUTROPHILS 87 (H) 40 - 73 % LYMPHOCYTES 6 (L) 21 - 52 % MONOCYTES 7 3 - 10 % EOSINOPHILS 0 0 - 5 % BASOPHILS 0 0 - 2 %  
 ABS. NEUTROPHILS 8.2 (H) 1.8 - 8.0 K/UL  
 ABS. LYMPHOCYTES 0.6 (L) 0.9 - 3.6 K/UL  
 ABS. MONOCYTES 0.7 0.05 - 1.2 K/UL  
 ABS. EOSINOPHILS 0.0 0.0 - 0.4 K/UL  
 ABS. BASOPHILS 0.0 0.0 - 0.1 K/UL  
 DF AUTOMATED PROTHROMBIN TIME + INR Collection Time: 04/11/19  1:20 PM  
Result Value Ref Range Prothrombin time 15.2 11.5 - 15.2 sec INR 1.2 0.8 - 1.2 TYPE & SCREEN Collection Time: 04/11/19  1:20 PM  
Result Value Ref Range Crossmatch Expiration 04/14/2019 ABO/Rh(D) PENDING Antibody screen PENDING Radiologic Studies -  
CT PELV WO CONT Final Result IMPRESSION:  
  
1. Subacute appearing sacral impaction fracture at the level of the S4-S5  
vertebral body junction with subtle callus formation but no convincing osseous  
bridging compared to prior study of 4/7/2019. No additional or acute osseous  
abnormality elsewhere throughout the pelvis. 2. Extremely advanced degenerative osteoarthropathy of right and left hips, as  
described. CT is approximately 86% sensitive for evaluating acute nondisplaced  
hip fractures. When CT detects no acute fracture and there is persistent  
clinical suspicion of an occult fracture, MRI is useful for more accurate and  
sensitive evaluation. 3. Partially visualized high-grade canal stenosis in the lower lumbar spine. Medical Decision Making I am the first provider for this patient. I reviewed the vital signs, available nursing notes, past medical history, past surgical history, family history and social history. Vital Signs-Reviewed the patient's vital signs. EKG: 
 
Records Reviewed: Nursing Notes and Old Medical Records (Time of Review: 1:02 PM) 
 
ED Course: Progress Notes, Reevaluation, and Consults: 
 
 
Provider Notes (Medical Decision Making): MDM Number of Diagnoses or Management Options Anticoagulated:  
Closed fracture of sacrum with routine healing, unspecified fracture morphology, subsequent encounter:  
Diagnosis management comments: Sacral fracture per dry CT done on Sunday however the patient has continued to be on Eliquis will repeat CT rule out continued bleeding 1:05 PM 
Discussed with Birgit Miller agrees with outpatient follow-up as long as repeat CT is stable requesting hold further Eliquis thank you until can be seen by Dr. Cyndi Ramirez 
 
 
 
2:03 PM 
Patient has 4 hours daily of assistance. Will hold in the ED until it is possible to reach the son for permission to increase level of care at home per request of caregiver. 2:06 PM 
Care arranged Diagnosis Clinical Impression: 1. Closed fracture of sacrum with routine healing, unspecified fracture morphology, subsequent encounter 2. Anticoagulated Disposition: home Follow-up Information Follow up With Specialties Details Why Contact [facility] EMERGENCY DEPT Emergency Medicine  As needed, If symptoms worsen [address] 
[phone] Emi Siemens, MD Internal Medicine Schedule an appointment as soon as possible for a visit for ED follow up  [facility] [place] [address] Internal Medicine [facility] [id number] [phone] Faith Saunders MD Orthopedic Surgery  for ED follow up appointment  [place] [address] [address] [place] [address] 
[phone] Patient's Medications Start Taking No medications on file Continue Taking ACETAMINOPHEN (TYLENOL EXTRA STRENGTH) 500 MG TABLET    Take 2 Tabs by mouth every six (6) hours as needed for Pain. APIXABAN (ELIQUIS) 2.5 MG TABLET    Take 1 Tab by mouth two (2) times a day. ATORVASTATIN (LIPITOR) 40 MG TABLET    Take 1 Tab by mouth nightly. CEPHALEXIN (KEFLEX) 500 MG CAPSULE    Take 1 Cap by mouth two (2) times a day for 5 days. DILTIAZEM CD (CARDIZEM CD) 180 MG ER CAPSULE    Take 1 Cap by mouth daily. DOCUSATE SODIUM (STOOL SOFTENER) 100 MG CAPSULE    Take 100 mg by mouth daily as needed for Constipation. ERGOCALCIFEROL (ERGOCALCIFEROL) 50,000 UNIT CAPSULE    Take 50,000 Units by mouth Once every 2 weeks. METOPROLOL TARTRATE (LOPRESSOR) 25 MG TABLET    Take 1 Tab by mouth every twelve (12) hours. Indications: hypertension NITROGLYCERIN (NITROSTAT) 0.4 MG SL TABLET    1 Tab by SubLINGual route every five (5) minutes as needed for Chest Pain. PANTOPRAZOLE (PROTONIX) 40 MG TABLET    Take 40 mg by mouth daily. Indications: GASTROESOPHAGEAL REFLUX  
 POLYETHYLENE GLYCOL (MIRALAX) 17 GRAM PACKET    Take 1 Packet by mouth daily. TRAMADOL (ULTRAM) 50 MG TABLET    Take 1 Tab by mouth every eight (8) hours as needed for Pain. Max Daily Amount: 150 mg. These Medications have changed No medications on file Stop Taking No medications on file  
 
_______________________________ Please note that this dictation was completed with [employer], the computer voice recognition software. Quite often unanticipated grammatical, syntax, homophones, and other interpretive errors are inadvertently transcribed by the computer software. Please disregard these errors. Please excuse any errors that have escaped final proofreading.

## 2019-04-11 NOTE — ED NOTES
I have reviewed discharge instructions with the patient and caregiver/aide. The patient and caregiver/aide verbalized understanding. Pt in no distress at time of discharge and agreeable to terms of discharge. Pt awaiting medical transport home.

## 2019-04-21 ENCOUNTER — HOSPITAL ENCOUNTER (EMERGENCY)
Age: 84
Discharge: HOME | End: 2019-04-21
Attending: EMERGENCY MEDICINE
Payer: MEDICARE

## 2019-04-21 VITALS
TEMPERATURE: 98.1 F | OXYGEN SATURATION: 100 % | BODY MASS INDEX: 20.49 KG/M2 | WEIGHT: 120 LBS | HEIGHT: 64 IN | SYSTOLIC BLOOD PRESSURE: 198 MMHG | RESPIRATION RATE: 16 BRPM | HEART RATE: 82 BPM | DIASTOLIC BLOOD PRESSURE: 87 MMHG

## 2019-04-21 DIAGNOSIS — K59.00 CONSTIPATION, UNSPECIFIED CONSTIPATION TYPE: Primary | ICD-10-CM

## 2019-04-21 DIAGNOSIS — K64.9 HEMORRHOIDS, UNSPECIFIED HEMORRHOID TYPE: ICD-10-CM

## 2019-04-21 LAB
ANION GAP SERPL CALC-SCNC: 6 MMOL/L (ref 3–18)
APPEARANCE UR: CLEAR
APTT PPP: 28.4 SEC (ref 23–36.4)
BACTERIA URNS QL MICRO: NEGATIVE /HPF
BASOPHILS # BLD: 0 K/UL (ref 0–0.1)
BASOPHILS NFR BLD: 0 % (ref 0–2)
BILIRUB UR QL: NEGATIVE
BUN SERPL-MCNC: 21 MG/DL (ref 7–18)
BUN/CREAT SERPL: 19 (ref 12–20)
CALCIUM SERPL-MCNC: 8.7 MG/DL (ref 8.5–10.1)
CHLORIDE SERPL-SCNC: 103 MMOL/L (ref 100–108)
CO2 SERPL-SCNC: 29 MMOL/L (ref 21–32)
COLOR UR: YELLOW
CREAT SERPL-MCNC: 1.08 MG/DL (ref 0.6–1.3)
DIFFERENTIAL METHOD BLD: ABNORMAL
EOSINOPHIL # BLD: 0.1 K/UL (ref 0–0.4)
EOSINOPHIL NFR BLD: 1 % (ref 0–5)
EPITH CASTS URNS QL MICRO: NORMAL /LPF (ref 0–5)
ERYTHROCYTE [DISTWIDTH] IN BLOOD BY AUTOMATED COUNT: 14 % (ref 11.6–14.5)
GLUCOSE SERPL-MCNC: 172 MG/DL (ref 74–99)
GLUCOSE UR STRIP.AUTO-MCNC: NEGATIVE MG/DL
HCT VFR BLD AUTO: 32.6 % (ref 35–45)
HGB BLD-MCNC: 10.4 G/DL (ref 12–16)
HGB UR QL STRIP: NEGATIVE
INR PPP: 1.2 (ref 0.8–1.2)
KETONES UR QL STRIP.AUTO: NEGATIVE MG/DL
LEUKOCYTE ESTERASE UR QL STRIP.AUTO: NEGATIVE
LYMPHOCYTES # BLD: 0.6 K/UL (ref 0.9–3.6)
LYMPHOCYTES NFR BLD: 12 % (ref 21–52)
MCH RBC QN AUTO: 29.5 PG (ref 24–34)
MCHC RBC AUTO-ENTMCNC: 31.9 G/DL (ref 31–37)
MCV RBC AUTO: 92.4 FL (ref 74–97)
MONOCYTES # BLD: 0.4 K/UL (ref 0.05–1.2)
MONOCYTES NFR BLD: 8 % (ref 3–10)
NEUTS SEG # BLD: 3.9 K/UL (ref 1.8–8)
NEUTS SEG NFR BLD: 79 % (ref 40–73)
NITRITE UR QL STRIP.AUTO: NEGATIVE
PH UR STRIP: 5.5 [PH] (ref 5–8)
PLATELET # BLD AUTO: 266 K/UL (ref 135–420)
PMV BLD AUTO: 9 FL (ref 9.2–11.8)
POTASSIUM SERPL-SCNC: 3.7 MMOL/L (ref 3.5–5.5)
PROT UR STRIP-MCNC: 30 MG/DL
PROTHROMBIN TIME: 14.8 SEC (ref 11.5–15.2)
RBC # BLD AUTO: 3.53 M/UL (ref 4.2–5.3)
RBC #/AREA URNS HPF: NORMAL /HPF (ref 0–5)
SODIUM SERPL-SCNC: 138 MMOL/L (ref 136–145)
SP GR UR REFRACTOMETRY: 1.01 (ref 1–1.03)
UROBILINOGEN UR QL STRIP.AUTO: 1 EU/DL (ref 0.2–1)
WBC # BLD AUTO: 4.9 K/UL (ref 4.6–13.2)
WBC URNS QL MICRO: NORMAL /HPF (ref 0–4)

## 2019-04-21 PROCEDURE — 99284 EMERGENCY DEPT VISIT MOD MDM: CPT

## 2019-04-21 PROCEDURE — 85610 PROTHROMBIN TIME: CPT

## 2019-04-21 PROCEDURE — 85730 THROMBOPLASTIN TIME PARTIAL: CPT

## 2019-04-21 PROCEDURE — 81001 URINALYSIS AUTO W/SCOPE: CPT

## 2019-04-21 PROCEDURE — 85025 COMPLETE CBC W/AUTO DIFF WBC: CPT

## 2019-04-21 PROCEDURE — 80048 BASIC METABOLIC PNL TOTAL CA: CPT

## 2019-04-21 RX ORDER — POLYETHYLENE GLYCOL 3350 17 G/17G
17 POWDER, FOR SOLUTION ORAL 2 TIMES DAILY
Qty: 30 PACKET | Refills: 0 | Status: SHIPPED | OUTPATIENT
Start: 2019-04-21

## 2019-04-21 NOTE — ED NOTES
Dr Francheska Mike in to see patient, patient with large hemorrhoids, scant bleeding from hemorrhoids

## 2019-04-21 NOTE — DISCHARGE INSTRUCTIONS
Patient Education        Constipation: Care Instructions  Your Care Instructions    Constipation means that you have a hard time passing stools (bowel movements). People pass stools from 3 times a day to once every 3 days. What is normal for you may be different. Constipation may occur with pain in the rectum and cramping. The pain may get worse when you try to pass stools. Sometimes there are small amounts of bright red blood on toilet paper or the surface of stools. This is because of enlarged veins near the rectum (hemorrhoids). A few changes in your diet and lifestyle may help you avoid ongoing constipation. Your doctor may also prescribe medicine to help loosen your stool. Some medicines can cause constipation. These include pain medicines and antidepressants. Tell your doctor about all the medicines you take. Your doctor may want to make a medicine change to ease your symptoms. Follow-up care is a key part of your treatment and safety. Be sure to make and go to all appointments, and call your doctor if you are having problems. It's also a good idea to know your test results and keep a list of the medicines you take. How can you care for yourself at home? · Drink plenty of fluids, enough so that your urine is light yellow or clear like water. If you have kidney, heart, or liver disease and have to limit fluids, talk with your doctor before you increase the amount of fluids you drink. · Include high-fiber foods in your diet each day. These include fruits, vegetables, beans, and whole grains. · Get at least 30 minutes of exercise on most days of the week. Walking is a good choice. You also may want to do other activities, such as running, swimming, cycling, or playing tennis or team sports. · Take a fiber supplement, such as Citrucel or Metamucil, every day. Read and follow all instructions on the label. · Schedule time each day for a bowel movement. A daily routine may help.  Take your time having your bowel movement. · Support your feet with a small step stool when you sit on the toilet. This helps flex your hips and places your pelvis in a squatting position. · Your doctor may recommend an over-the-counter laxative to relieve your constipation. Examples are Milk of Magnesia and MiraLax. Read and follow all instructions on the label. Do not use laxatives on a long-term basis. When should you call for help? Call your doctor now or seek immediate medical care if:    · You have new or worse belly pain.     · You have new or worse nausea or vomiting.     · You have blood in your stools.    Watch closely for changes in your health, and be sure to contact your doctor if:    · Your constipation is getting worse.     · You do not get better as expected. Where can you learn more? Go to http://christ-antwon.info/. Enter 21 [phone] in the search box to learn more about \"Constipation: Care Instructions. \"  Current as of: September 23, 2018  Content Version: 11.9  © 9442-9589 [employer]. Care instructions adapted under license by [employer] (which disclaims liability or warranty for this information). If you have questions about a medical condition or this instruction, always ask your healthcare professional. [place] [address] any warranty or liability for your use of this information. Patient Education        Hemorrhoids: Care Instructions  Your Care Instructions    Hemorrhoids are enlarged veins that develop in the anal canal. Bleeding during bowel movements, itching, swelling, and rectal pain are the most common symptoms. They can be uncomfortable at times, but hemorrhoids rarely are a serious problem. You can treat most hemorrhoids with simple changes to your diet and bowel habits. These changes include eating more fiber and not straining to pass stools.  Most hemorrhoids do not need surgery or other treatment unless they are very large and painful or bleed a lot. Follow-up care is a key part of your treatment and safety. Be sure to make and go to all appointments, and call your doctor if you are having problems. It's also a good idea to know your test results and keep a list of the medicines you take. How can you care for yourself at home? · Sit in a few inches of warm water (sitz bath) 3 times a day and after bowel movements. The warm water helps with pain and itching. · Put ice on your anal area several times a day for 10 minutes at a time. Put a thin cloth between the ice and your skin. Follow this by placing a warm, wet towel on the area for another 10 to 20 minutes. · Take pain medicines exactly as directed. ? If the doctor gave you a prescription medicine for pain, take it as prescribed. ? If you are not taking a prescription pain medicine, ask your doctor if you can take an over-the-counter medicine. · Keep the anal area clean, but be gentle. Use water and a fragrance-free soap, such as [place], or use baby wipes or medicated pads, such as Tucks. · Wear cotton underwear and loose clothing to decrease moisture in the anal area. · Eat more fiber. Include foods such as whole-grain breads and cereals, raw vegetables, raw and dried fruits, and beans. · Drink plenty of fluids, enough so that your urine is light yellow or clear like water. If you have kidney, heart, or liver disease and have to limit fluids, talk with your doctor before you increase the amount of fluids you drink. · Use a stool softener that contains bran or psyllium. You can save money by buying bran or psyllium (available in bulk at most health food stores) and sprinkling it on foods or stirring it into fruit juice. Or you can use a product such as Metamucil or Hydrocil. · Practice healthy bowel habits. ? Go to the bathroom as soon as you have the urge. ? Avoid straining to pass stools. Relax and give yourself time to let things happen naturally.   ? Do not hold your breath while passing stools. ? Do not read while sitting on the toilet. Get off the toilet as soon as you have finished. · Take your medicines exactly as prescribed. Call your doctor if you think you are having a problem with your medicine. When should you call for help? Call 911 anytime you think you may need emergency care. For example, call if:    · You pass maroon or very bloody stools.    Call your doctor now or seek immediate medical care if:    · You have increased pain.     · You have increased bleeding.    Watch closely for changes in your health, and be sure to contact your doctor if:    · Your symptoms have not improved after 3 or 4 days. Where can you learn more? Go to http://christ-antwon.info/. Enter F228 in the search box to learn more about \"Hemorrhoids: Care Instructions. \"  Current as of: March 27, 2018  Content Version: 11.9  © 7793-5537 [employer], [employer]. Care instructions adapted under license by [employer] (which disclaims liability or warranty for this information). If you have questions about a medical condition or this instruction, always ask your healthcare professional. [place] [address] any warranty or liability for your use of this information.

## 2019-04-21 NOTE — ED NOTES
I have reviewed discharge instruction and prescriptions with patient. Patient verbalized understanding and has no further questions at this time. Education taught and patient verbalized understanding of education. Teach back method used. Right ac IV removed, catheter tip intact on removal.  Armband removed and shredded per patients request.   
Patients pain 3/10. Belongings given to patient. Patient discharged to be with [facility] Transport to home. Awaiting transportation at this time.

## 2019-04-21 NOTE — ED PROVIDER NOTES
EMERGENCY DEPARTMENT HISTORY AND PHYSICAL EXAM 
 
9:56 AM 
 
 
Date: 4/21/2019 Patient Name: [facility] ERNESTINE History of Presenting Illness No chief complaint on file. History Provided By: patient Additional History (Context): [facility] ERNESTINE is a 80 y.o. female presents with rectal bleeding for 1 day, has hemorrhoids, bowel movements are painful and she has been constipated. Recently she suffered a fall and a broken coccyx. She has been off of Eliquis since the 11th. She does feel weak. No syncope. Her pain is severe and exacerbated by pressure on her bottom. No medications tried for this. Raissa Kwok PCP: Richard Langford MD 
 
Chief Complaint:  
Duration:   
Timing: Location:  
Quality:  
Severity:  
Modifying Factors:  
Associated Symptoms:  
 
 
Current Outpatient Medications Medication Sig Dispense Refill  ergocalciferol (ERGOCALCIFEROL) 50,000 unit capsule Take 50,000 Units by mouth Once every 2 weeks.  docusate sodium (STOOL SOFTENER) 100 mg capsule Take 100 mg by mouth daily as needed for Constipation.  dilTIAZem CD (CARDIZEM CD) 180 mg ER capsule Take 1 Cap by mouth daily. (Patient taking differently: Take 240 mg by mouth daily.) 90 Cap 3  
 metoprolol tartrate (LOPRESSOR) 25 mg tablet Take 1 Tab by mouth every twelve (12) hours. Indications: hypertension 180 Tab 2  
 nitroglycerin (NITROSTAT) 0.4 mg SL tablet 1 Tab by SubLINGual route every five (5) minutes as needed for Chest Pain. (Patient taking differently: 1 Tab by SubLINGual route every five (5) minutes as needed for Chest Pain. 3 doses then call 911) 30 Tab 2  
 acetaminophen (TYLENOL EXTRA STRENGTH) 500 mg tablet Take 2 Tabs by mouth every six (6) hours as needed for Pain. 50 Tab 0  
 atorvastatin (LIPITOR) 40 mg tablet Take 1 Tab by mouth nightly. 30 Tab 0  
 polyethylene glycol (MIRALAX) 17 gram packet Take 1 Packet by mouth daily. 30 Packet 0  
 pantoprazole (PROTONIX) 40 mg tablet Take 40 mg by mouth daily. Indications: GASTROESOPHAGEAL REFLUX Past History Past Medical History: 
Past Medical History:  
Diagnosis Date  Coronary artery disease  Degenerative joint disease  Degenerative spine disease  Dyslipidemia  Hypertension  Hypertension  Kidney stone  Osteoarthritis Past Surgical History: 
Past Surgical History:  
Procedure Laterality Date  HX APPENDECTOMY  HX HYSTERECTOMY Family History: 
Family History Family history unknown: Yes  
 
 
Social History: 
Social History Tobacco Use  Smoking status: Never Smoker  Smokeless tobacco: Never Used Substance Use Topics  Alcohol use: No  
 Drug use: No  
 
 
Allergies: Allergies Allergen Reactions  Demerol [Meperidine] Other (comments) Oral pain  Erythromycin Other (comments)  Nitrofurantoin Other (comments) Mouth pain  Pcn [Penicillins] Other (comments) Oral pain  Sulfa (Sulfonamide Antibiotics) Other (comments) Oral pain Review of Systems Review of Systems Constitutional: Negative for diaphoresis and fever. HENT: Negative for congestion and sore throat. Eyes: Negative for pain and itching. Respiratory: Negative for cough and shortness of breath. Cardiovascular: Negative for chest pain and palpitations. Gastrointestinal: Positive for anal bleeding and rectal pain. Negative for abdominal pain and diarrhea. Endocrine: Negative for polydipsia and polyuria. Genitourinary: Negative for dysuria and hematuria. Musculoskeletal: Negative for arthralgias and myalgias. Skin: Negative for rash and wound. Neurological: Negative for seizures and syncope. Hematological: Does not bruise/bleed easily. Psychiatric/Behavioral: Negative for agitation and hallucinations. Physical Exam  
 
 
No data found. Physical Exam  
Constitutional: She appears well-developed and well-nourished.   
HENT:  
 Head: Normocephalic and atraumatic. Eyes: Conjunctivae are normal. No scleral icterus. Neck: Normal range of motion. Neck supple. No JVD present. Cardiovascular: Normal rate, regular rhythm and normal heart sounds. 4 intact extremity pulses Pulmonary/Chest: Effort normal and breath sounds normal.  
Abdominal: Soft. She exhibits no mass. There is no tenderness. Genitourinary:  
Genitourinary Comments: Rectal exam with nurse Romelia Mohan as chaperone, large almost fungating hemorrhoid is present, non-engorged. There is a smaller hemorrhoid with minor bleeding that may be the culprit. No fecal impaction, hard stool in the vault, no large amount of blood found in the rectum. Musculoskeletal: Normal range of motion. Lymphadenopathy:  
  She has no cervical adenopathy. Neurological: She is alert. Skin: Skin is warm and dry. Nursing note and vitals reviewed. Diagnostic Study Results Labs - No results found for this or any previous visit (from the past 12 hour(s)). Radiologic Studies - No orders to display No results found. Medications ordered:  
Medications - No data to display Medical Decision Making Initial Medical Decision Making and DDx: 
Will evaluate for anemia and need for transfusion. At this point GI bleed is less likely, hemorrhoid bleeding is most probable. Likely needs referral to colorectal surgeon for definitive management as the hemorrhoids are very large. ED Course: Progress Notes, Reevaluation, and Consults: 
  
 
I am the first provider for this patient. I reviewed the vital signs, available nursing notes, past medical history, past surgical history, family history and social history. No data found. Vital Signs-Reviewed the patient's vital signs. Pulse Oximetry Analysis, Cardiac Monitor, 12 lead ekg: Interpreted by the EP. Records Reviewed: Nursing notes reviewed (Time of Review: 9:56 AM) Procedures:  
Critical Care Time:  
 Aspirin: (was aspirin given for stroke?) Diagnosis Clinical Impression: 1. Constipation, unspecified constipation type 2. Hemorrhoids, unspecified hemorrhoid type Disposition: home Follow-up Information None Patient's Medications Start Taking No medications on file Continue Taking ACETAMINOPHEN (TYLENOL EXTRA STRENGTH) 500 MG TABLET    Take 2 Tabs by mouth every six (6) hours as needed for Pain. ATORVASTATIN (LIPITOR) 40 MG TABLET    Take 1 Tab by mouth nightly. DILTIAZEM CD (CARDIZEM CD) 180 MG ER CAPSULE    Take 1 Cap by mouth daily. DOCUSATE SODIUM (STOOL SOFTENER) 100 MG CAPSULE    Take 100 mg by mouth daily as needed for Constipation. ERGOCALCIFEROL (ERGOCALCIFEROL) 50,000 UNIT CAPSULE    Take 50,000 Units by mouth Once every 2 weeks. METOPROLOL TARTRATE (LOPRESSOR) 25 MG TABLET    Take 1 Tab by mouth every twelve (12) hours. Indications: hypertension NITROGLYCERIN (NITROSTAT) 0.4 MG SL TABLET    1 Tab by SubLINGual route every five (5) minutes as needed for Chest Pain. PANTOPRAZOLE (PROTONIX) 40 MG TABLET    Take 40 mg by mouth daily. Indications: GASTROESOPHAGEAL REFLUX  
 POLYETHYLENE GLYCOL (MIRALAX) 17 GRAM PACKET    Take 1 Packet by mouth daily. These Medications have changed No medications on file Stop Taking No medications on file  
 
_______________________________ Notes:   
Parvez Sanchez MD using Dragon dictation     
_______________________________

## 2019-05-01 ENCOUNTER — HOME HEALTH ADMISSION (OUTPATIENT)
Dept: HOME HEALTH SERVICES | Facility: HOME HEALTH | Age: 84
End: 2019-05-01
Payer: MEDICARE

## 2019-05-02 ENCOUNTER — HOME CARE VISIT (OUTPATIENT)
Dept: HOME HEALTH SERVICES | Facility: HOME HEALTH | Age: 84
End: 2019-05-02
Payer: MEDICARE

## 2019-05-02 ENCOUNTER — HOME CARE VISIT (OUTPATIENT)
Dept: SCHEDULING | Facility: HOME HEALTH | Age: 84
End: 2019-05-02
Payer: MEDICARE

## 2019-05-02 ENCOUNTER — HOME CARE VISIT (OUTPATIENT)
Dept: HOME HEALTH SERVICES | Facility: HOME HEALTH | Age: 84
End: 2019-05-02

## 2019-05-02 VITALS
SYSTOLIC BLOOD PRESSURE: 125 MMHG | TEMPERATURE: 99.2 F | OXYGEN SATURATION: 93 % | DIASTOLIC BLOOD PRESSURE: 60 MMHG | HEART RATE: 66 BPM

## 2019-05-02 PROCEDURE — 3331090001 HH PPS REVENUE CREDIT

## 2019-05-02 PROCEDURE — 3331090002 HH PPS REVENUE DEBIT

## 2019-05-02 PROCEDURE — G0151 HHCP-SERV OF PT,EA 15 MIN: HCPCS

## 2019-05-02 PROCEDURE — 400013 HH SOC

## 2019-05-03 ENCOUNTER — HOME CARE VISIT (OUTPATIENT)
Dept: HOME HEALTH SERVICES | Facility: HOME HEALTH | Age: 84
End: 2019-05-03
Payer: MEDICARE

## 2019-05-03 PROCEDURE — 3331090001 HH PPS REVENUE CREDIT

## 2019-05-03 PROCEDURE — 3331090002 HH PPS REVENUE DEBIT

## 2019-05-03 PROCEDURE — G0157 HHC PT ASSISTANT EA 15: HCPCS

## 2019-05-04 VITALS
SYSTOLIC BLOOD PRESSURE: 138 MMHG | TEMPERATURE: 98.1 F | DIASTOLIC BLOOD PRESSURE: 70 MMHG | OXYGEN SATURATION: 97 % | HEART RATE: 66 BPM

## 2019-05-04 PROCEDURE — 3331090001 HH PPS REVENUE CREDIT

## 2019-05-04 PROCEDURE — 3331090002 HH PPS REVENUE DEBIT

## 2019-05-05 PROCEDURE — 3331090001 HH PPS REVENUE CREDIT

## 2019-05-05 PROCEDURE — 3331090002 HH PPS REVENUE DEBIT

## 2019-05-06 PROCEDURE — 3331090001 HH PPS REVENUE CREDIT

## 2019-05-06 PROCEDURE — 3331090002 HH PPS REVENUE DEBIT

## 2019-05-07 PROCEDURE — 3331090002 HH PPS REVENUE DEBIT

## 2019-05-07 PROCEDURE — 3331090001 HH PPS REVENUE CREDIT

## 2019-05-08 ENCOUNTER — HOME CARE VISIT (OUTPATIENT)
Dept: SCHEDULING | Facility: HOME HEALTH | Age: 84
End: 2019-05-08
Payer: MEDICARE

## 2019-05-08 PROCEDURE — 3331090002 HH PPS REVENUE DEBIT

## 2019-05-08 PROCEDURE — 3331090001 HH PPS REVENUE CREDIT

## 2019-05-08 PROCEDURE — G0157 HHC PT ASSISTANT EA 15: HCPCS

## 2019-05-09 VITALS
DIASTOLIC BLOOD PRESSURE: 60 MMHG | TEMPERATURE: 98.7 F | SYSTOLIC BLOOD PRESSURE: 130 MMHG | HEART RATE: 65 BPM | OXYGEN SATURATION: 91 %

## 2019-05-09 PROCEDURE — 3331090001 HH PPS REVENUE CREDIT

## 2019-05-09 PROCEDURE — 3331090002 HH PPS REVENUE DEBIT

## 2019-05-10 ENCOUNTER — HOME CARE VISIT (OUTPATIENT)
Dept: SCHEDULING | Facility: HOME HEALTH | Age: 84
End: 2019-05-10
Payer: MEDICARE

## 2019-05-10 PROCEDURE — G0157 HHC PT ASSISTANT EA 15: HCPCS

## 2019-05-10 PROCEDURE — 3331090001 HH PPS REVENUE CREDIT

## 2019-05-10 PROCEDURE — 3331090002 HH PPS REVENUE DEBIT

## 2019-05-11 VITALS
SYSTOLIC BLOOD PRESSURE: 130 MMHG | TEMPERATURE: 98.3 F | HEART RATE: 64 BPM | DIASTOLIC BLOOD PRESSURE: 64 MMHG | OXYGEN SATURATION: 96 %

## 2019-05-11 PROCEDURE — 3331090002 HH PPS REVENUE DEBIT

## 2019-05-11 PROCEDURE — 3331090001 HH PPS REVENUE CREDIT

## 2019-05-12 PROCEDURE — 3331090001 HH PPS REVENUE CREDIT

## 2019-05-12 PROCEDURE — 3331090002 HH PPS REVENUE DEBIT

## 2019-05-13 PROCEDURE — 3331090002 HH PPS REVENUE DEBIT

## 2019-05-13 PROCEDURE — 3331090001 HH PPS REVENUE CREDIT

## 2019-05-14 ENCOUNTER — HOME CARE VISIT (OUTPATIENT)
Dept: SCHEDULING | Facility: HOME HEALTH | Age: 84
End: 2019-05-14
Payer: MEDICARE

## 2019-05-14 VITALS
SYSTOLIC BLOOD PRESSURE: 140 MMHG | HEART RATE: 73 BPM | TEMPERATURE: 97.6 F | DIASTOLIC BLOOD PRESSURE: 76 MMHG | OXYGEN SATURATION: 97 %

## 2019-05-14 PROCEDURE — 3331090001 HH PPS REVENUE CREDIT

## 2019-05-14 PROCEDURE — 3331090002 HH PPS REVENUE DEBIT

## 2019-05-14 PROCEDURE — G0157 HHC PT ASSISTANT EA 15: HCPCS

## 2019-05-15 PROCEDURE — 3331090002 HH PPS REVENUE DEBIT

## 2019-05-15 PROCEDURE — 3331090001 HH PPS REVENUE CREDIT

## 2019-05-16 ENCOUNTER — HOME CARE VISIT (OUTPATIENT)
Dept: SCHEDULING | Facility: HOME HEALTH | Age: 84
End: 2019-05-16
Payer: MEDICARE

## 2019-05-16 VITALS
DIASTOLIC BLOOD PRESSURE: 62 MMHG | OXYGEN SATURATION: 97 % | HEART RATE: 83 BPM | SYSTOLIC BLOOD PRESSURE: 124 MMHG | TEMPERATURE: 98.7 F

## 2019-05-16 PROCEDURE — 3331090002 HH PPS REVENUE DEBIT

## 2019-05-16 PROCEDURE — G0157 HHC PT ASSISTANT EA 15: HCPCS

## 2019-05-16 PROCEDURE — 3331090001 HH PPS REVENUE CREDIT

## 2019-05-17 PROCEDURE — 3331090001 HH PPS REVENUE CREDIT

## 2019-05-17 PROCEDURE — 3331090002 HH PPS REVENUE DEBIT

## 2019-05-18 PROCEDURE — 3331090001 HH PPS REVENUE CREDIT

## 2019-05-18 PROCEDURE — 3331090002 HH PPS REVENUE DEBIT

## 2019-05-19 PROCEDURE — 3331090001 HH PPS REVENUE CREDIT

## 2019-05-19 PROCEDURE — 3331090002 HH PPS REVENUE DEBIT

## 2019-05-20 PROCEDURE — 3331090002 HH PPS REVENUE DEBIT

## 2019-05-20 PROCEDURE — 3331090001 HH PPS REVENUE CREDIT

## 2019-05-21 ENCOUNTER — HOME CARE VISIT (OUTPATIENT)
Dept: SCHEDULING | Facility: HOME HEALTH | Age: 84
End: 2019-05-21
Payer: MEDICARE

## 2019-05-21 PROCEDURE — 3331090001 HH PPS REVENUE CREDIT

## 2019-05-21 PROCEDURE — 3331090002 HH PPS REVENUE DEBIT

## 2019-05-21 PROCEDURE — G0157 HHC PT ASSISTANT EA 15: HCPCS

## 2019-05-22 ENCOUNTER — HOME CARE VISIT (OUTPATIENT)
Dept: SCHEDULING | Facility: HOME HEALTH | Age: 84
End: 2019-05-22
Payer: MEDICARE

## 2019-05-22 VITALS
SYSTOLIC BLOOD PRESSURE: 125 MMHG | TEMPERATURE: 98.2 F | OXYGEN SATURATION: 93 % | DIASTOLIC BLOOD PRESSURE: 60 MMHG | HEART RATE: 67 BPM

## 2019-05-22 PROCEDURE — 3331090002 HH PPS REVENUE DEBIT

## 2019-05-22 PROCEDURE — G0151 HHCP-SERV OF PT,EA 15 MIN: HCPCS

## 2019-05-22 PROCEDURE — 3331090001 HH PPS REVENUE CREDIT

## 2019-05-23 ENCOUNTER — HOSPITAL ENCOUNTER (EMERGENCY)
Age: 84
Discharge: HOME | End: 2019-05-23
Attending: EMERGENCY MEDICINE
Payer: MEDICARE

## 2019-05-23 ENCOUNTER — APPOINTMENT (OUTPATIENT)
Dept: CT IMAGING | Age: 84
End: 2019-05-23
Attending: EMERGENCY MEDICINE
Payer: MEDICARE

## 2019-05-23 VITALS
TEMPERATURE: 98.6 F | HEIGHT: 64 IN | RESPIRATION RATE: 20 BRPM | OXYGEN SATURATION: 94 % | BODY MASS INDEX: 21.17 KG/M2 | HEART RATE: 129 BPM | SYSTOLIC BLOOD PRESSURE: 154 MMHG | DIASTOLIC BLOOD PRESSURE: 80 MMHG | WEIGHT: 124 LBS

## 2019-05-23 DIAGNOSIS — R10.9 LEFT SIDED ABDOMINAL PAIN: ICD-10-CM

## 2019-05-23 DIAGNOSIS — I48.91 ATRIAL FIBRILLATION, UNSPECIFIED TYPE (HCC): ICD-10-CM

## 2019-05-23 DIAGNOSIS — K59.00 CONSTIPATION, UNSPECIFIED CONSTIPATION TYPE: Primary | ICD-10-CM

## 2019-05-23 LAB
ALBUMIN SERPL-MCNC: 2.9 G/DL (ref 3.4–5)
ALBUMIN/GLOB SERPL: 0.7 {RATIO} (ref 0.8–1.7)
ALP SERPL-CCNC: 92 U/L (ref 45–117)
ALT SERPL-CCNC: 20 U/L (ref 13–56)
ANION GAP SERPL CALC-SCNC: 8 MMOL/L (ref 3–18)
APPEARANCE UR: CLEAR
AST SERPL-CCNC: 19 U/L (ref 15–37)
ATRIAL RATE: 288 BPM
BACTERIA URNS QL MICRO: NEGATIVE /HPF
BASOPHILS # BLD: 0 K/UL (ref 0–0.1)
BASOPHILS NFR BLD: 0 % (ref 0–2)
BILIRUB SERPL-MCNC: 0.7 MG/DL (ref 0.2–1)
BILIRUB UR QL: NEGATIVE
BUN SERPL-MCNC: 19 MG/DL (ref 7–18)
BUN/CREAT SERPL: 16 (ref 12–20)
CALCIUM SERPL-MCNC: 8.6 MG/DL (ref 8.5–10.1)
CALCULATED R AXIS, ECG10: 62 DEGREES
CALCULATED T AXIS, ECG11: 57 DEGREES
CHLORIDE SERPL-SCNC: 102 MMOL/L (ref 100–108)
CO2 SERPL-SCNC: 29 MMOL/L (ref 21–32)
COLOR UR: YELLOW
CREAT SERPL-MCNC: 1.19 MG/DL (ref 0.6–1.3)
DIAGNOSIS, 93000: NORMAL
DIFFERENTIAL METHOD BLD: ABNORMAL
EOSINOPHIL # BLD: 0 K/UL (ref 0–0.4)
EOSINOPHIL NFR BLD: 1 % (ref 0–5)
EPITH CASTS URNS QL MICRO: NORMAL /LPF (ref 0–5)
ERYTHROCYTE [DISTWIDTH] IN BLOOD BY AUTOMATED COUNT: 14.1 % (ref 11.6–14.5)
GLOBULIN SER CALC-MCNC: 4.3 G/DL (ref 2–4)
GLUCOSE SERPL-MCNC: 167 MG/DL (ref 74–99)
GLUCOSE UR STRIP.AUTO-MCNC: NEGATIVE MG/DL
HCT VFR BLD AUTO: 34.3 % (ref 35–45)
HGB BLD-MCNC: 10.8 G/DL (ref 12–16)
HGB UR QL STRIP: NEGATIVE
KETONES UR QL STRIP.AUTO: NEGATIVE MG/DL
LACTATE BLD-SCNC: 0.82 MMOL/L (ref 0.4–2)
LEUKOCYTE ESTERASE UR QL STRIP.AUTO: NEGATIVE
LIPASE SERPL-CCNC: 123 U/L (ref 73–393)
LYMPHOCYTES # BLD: 0.5 K/UL (ref 0.9–3.6)
LYMPHOCYTES NFR BLD: 8 % (ref 21–52)
MCH RBC QN AUTO: 29.3 PG (ref 24–34)
MCHC RBC AUTO-ENTMCNC: 31.5 G/DL (ref 31–37)
MCV RBC AUTO: 93 FL (ref 74–97)
MONOCYTES # BLD: 0.5 K/UL (ref 0.05–1.2)
MONOCYTES NFR BLD: 7 % (ref 3–10)
NEUTS SEG # BLD: 5.2 K/UL (ref 1.8–8)
NEUTS SEG NFR BLD: 84 % (ref 40–73)
NITRITE UR QL STRIP.AUTO: NEGATIVE
PH UR STRIP: 7.5 [PH] (ref 5–8)
PLATELET # BLD AUTO: 296 K/UL (ref 135–420)
PMV BLD AUTO: 9.9 FL (ref 9.2–11.8)
POTASSIUM SERPL-SCNC: 4 MMOL/L (ref 3.5–5.5)
PROT SERPL-MCNC: 7.2 G/DL (ref 6.4–8.2)
PROT UR STRIP-MCNC: 100 MG/DL
Q-T INTERVAL, ECG07: 300 MS
QRS DURATION, ECG06: 86 MS
QTC CALCULATION (BEZET), ECG08: 387 MS
RBC # BLD AUTO: 3.69 M/UL (ref 4.2–5.3)
RBC #/AREA URNS HPF: NORMAL /HPF (ref 0–5)
SODIUM SERPL-SCNC: 139 MMOL/L (ref 136–145)
SP GR UR REFRACTOMETRY: 1.01 (ref 1–1.03)
UROBILINOGEN UR QL STRIP.AUTO: 0.2 EU/DL (ref 0.2–1)
VENTRICULAR RATE, ECG03: 100 BPM
WBC # BLD AUTO: 6.3 K/UL (ref 4.6–13.2)
WBC URNS QL MICRO: NORMAL /HPF (ref 0–4)

## 2019-05-23 PROCEDURE — 81001 URINALYSIS AUTO W/SCOPE: CPT

## 2019-05-23 PROCEDURE — 99284 EMERGENCY DEPT VISIT MOD MDM: CPT

## 2019-05-23 PROCEDURE — 80053 COMPREHEN METABOLIC PANEL: CPT

## 2019-05-23 PROCEDURE — 93005 ELECTROCARDIOGRAM TRACING: CPT

## 2019-05-23 PROCEDURE — 83690 ASSAY OF LIPASE: CPT

## 2019-05-23 PROCEDURE — 74177 CT ABD & PELVIS W/CONTRAST: CPT

## 2019-05-23 PROCEDURE — 3331090001 HH PPS REVENUE CREDIT

## 2019-05-23 PROCEDURE — 85025 COMPLETE CBC W/AUTO DIFF WBC: CPT

## 2019-05-23 PROCEDURE — 74011636320 HC RX REV CODE- 636/320: Performed by: EMERGENCY MEDICINE

## 2019-05-23 PROCEDURE — 83605 ASSAY OF LACTIC ACID: CPT

## 2019-05-23 PROCEDURE — 3331090002 HH PPS REVENUE DEBIT

## 2019-05-23 RX ORDER — ACETAMINOPHEN 500 MG
1000 TABLET ORAL
Status: DISCONTINUED | OUTPATIENT
Start: 2019-05-23 | End: 2019-05-23 | Stop reason: HOSPADM

## 2019-05-23 RX ADMIN — IOPAMIDOL 80 ML: 612 INJECTION, SOLUTION INTRAVENOUS at 13:49

## 2019-05-23 NOTE — DISCHARGE INSTRUCTIONS
Patient Education        Constipation: Care Instructions  Your Care Instructions    Constipation means that you have a hard time passing stools (bowel movements). People pass stools from 3 times a day to once every 3 days. What is normal for you may be different. Constipation may occur with pain in the rectum and cramping. The pain may get worse when you try to pass stools. Sometimes there are small amounts of bright red blood on toilet paper or the surface of stools. This is because of enlarged veins near the rectum (hemorrhoids). A few changes in your diet and lifestyle may help you avoid ongoing constipation. Your doctor may also prescribe medicine to help loosen your stool. Some medicines can cause constipation. These include pain medicines and antidepressants. Tell your doctor about all the medicines you take. Your doctor may want to make a medicine change to ease your symptoms. Follow-up care is a key part of your treatment and safety. Be sure to make and go to all appointments, and call your doctor if you are having problems. It's also a good idea to know your test results and keep a list of the medicines you take. How can you care for yourself at home? · Drink plenty of fluids, enough so that your urine is light yellow or clear like water. If you have kidney, heart, or liver disease and have to limit fluids, talk with your doctor before you increase the amount of fluids you drink. · Include high-fiber foods in your diet each day. These include fruits, vegetables, beans, and whole grains. · Get at least 30 minutes of exercise on most days of the week. Walking is a good choice. You also may want to do other activities, such as running, swimming, cycling, or playing tennis or team sports. · Take a fiber supplement, such as Citrucel or Metamucil, every day. Read and follow all instructions on the label. · Schedule time each day for a bowel movement. A daily routine may help.  Take your time having your bowel movement. · Support your feet with a small step stool when you sit on the toilet. This helps flex your hips and places your pelvis in a squatting position. · Your doctor may recommend an over-the-counter laxative to relieve your constipation. Examples are Milk of Magnesia and MiraLax. Read and follow all instructions on the label. Do not use laxatives on a long-term basis. When should you call for help? Call your doctor now or seek immediate medical care if:    · You have new or worse belly pain.     · You have new or worse nausea or vomiting.     · You have blood in your stools.    Watch closely for changes in your health, and be sure to contact your doctor if:    · Your constipation is getting worse.     · You do not get better as expected. Where can you learn more? Go to http://christ-antwon.info/. Enter 21 [id number] in the search box to learn more about \"Constipation: Care Instructions. \"  Current as of: September 23, 2018  Content Version: 11.9  © 7721-1470 [employer]. Care instructions adapted under license by [employer] (which disclaims liability or warranty for this information). If you have questions about a medical condition or this instruction, always ask your healthcare professional. [place] [address] any warranty or liability for your use of this information. Patient Education        Abdominal Pain: Care Instructions  Your Care Instructions    Abdominal pain has many possible causes. Some aren't serious and get better on their own in a few days. Others need more testing and treatment. If your pain continues or gets worse, you need to be rechecked and may need more tests to find out what is wrong. You may need surgery to correct the problem. Don't ignore new symptoms, such as fever, nausea and vomiting, urination problems, pain that gets worse, and dizziness. These may be signs of a more serious problem.   Your doctor may have recommended a follow-up visit in the next 8 to 12 hours. If you are not getting better, you may need more tests or treatment. The doctor has checked you carefully, but problems can develop later. If you notice any problems or new symptoms, get medical treatment right away. Follow-up care is a key part of your treatment and safety. Be sure to make and go to all appointments, and call your doctor if you are having problems. It's also a good idea to know your test results and keep a list of the medicines you take. How can you care for yourself at home? · Rest until you feel better. · To prevent dehydration, drink plenty of fluids, enough so that your urine is light yellow or clear like water. Choose water and other caffeine-free clear liquids until you feel better. If you have kidney, heart, or liver disease and have to limit fluids, talk with your doctor before you increase the amount of fluids you drink. · If your stomach is upset, eat mild foods, such as rice, dry toast or crackers, bananas, and applesauce. Try eating several small meals instead of two or three large ones. · Wait until 48 hours after all symptoms have gone away before you have spicy foods, alcohol, and drinks that contain caffeine. · Do not eat foods that are high in fat. · Avoid anti-inflammatory medicines such as aspirin, ibuprofen (Advil, Motrin), and naproxen (Aleve). These can cause stomach upset. Talk to your doctor if you take daily aspirin for another health problem. When should you call for help? Call 911 anytime you think you may need emergency care.  For example, call if:    · You passed out (lost consciousness).     · You pass maroon or very bloody stools.     · You vomit blood or what looks like coffee grounds.     · You have new, severe belly pain.    Call your doctor now or seek immediate medical care if:    · Your pain gets worse, especially if it becomes focused in one area of your belly.     · You have a new or higher fever.     · Your stools are black and look like tar, or they have streaks of blood.     · You have unexpected vaginal bleeding.     · You have symptoms of a urinary tract infection. These may include:  ? Pain when you urinate. ? Urinating more often than usual.  ? Blood in your urine.     · You are dizzy or lightheaded, or you feel like you may faint.    Watch closely for changes in your health, and be sure to contact your doctor if:    · You are not getting better after 1 day (24 hours). Where can you learn more? Go to http://christ-antwon.info/. Enter M528 in the search box to learn more about \"Abdominal Pain: Care Instructions. \"  Current as of: September 23, 2018  Content Version: 11.9  © 3502-9911 [employer], [employer]. Care instructions adapted under license by [employer] (which disclaims liability or warranty for this information). If you have questions about a medical condition or this instruction, always ask your healthcare professional. [address] any warranty or liability for your use of this information.

## 2019-05-23 NOTE — ED NOTES
I have reviewed discharge instruction and prescriptions with patient. Patient verbalized understanding and has no further questions at this time. Education taught and patient verbalized understanding of education. Teach back method used. Left ac IV removed, catheter tip intact on removal.  Armband removed and shredded per patients request.    Patients pain 6/10. Belongings given to patient. Patient discharged with son to home.

## 2019-05-23 NOTE — ED PROVIDER NOTES
[address] [facility]  EMERGENCY DEPARTMENT HISTORY AND PHYSICAL EXAM       Date: 5/23/2019   Patient Name: Daniela Jiménez   YOB: 1923  Medical Record Number: [id number]    HISTORY OF PRESENTING ILLNESS:     Daniela Jiménez is a 80 y.o. female with history of nephrolithiasis presents with left flank pain radiating to her left abdomen intermittent which is sharp and nagging over the past 1 week with no exacerbating sxs. Patient states sometimes after she has a bowel movement the pain improves. She does report mild constipation after decreasing her Miralax from twice a day to once a day. Primary Care Provider: Violette Ruiz MD   Specialist:    Past Medical History:   Past Medical History:   Diagnosis Date    Coronary artery disease     Degenerative joint disease     Degenerative spine disease     Dyslipidemia     Hypertension     Hypertension     Kidney stone     Osteoarthritis         Past Surgical History:   Past Surgical History:   Procedure Laterality Date    HX APPENDECTOMY      HX HYSTERECTOMY          Social History:   Social History     Tobacco Use    Smoking status: Never Smoker    Smokeless tobacco: Never Used   Substance Use Topics    Alcohol use: No    Drug use: No        Allergies: Allergies   Allergen Reactions    Demerol [Meperidine] Other (comments)     Oral pain     Erythromycin Other (comments)    Nitrofurantoin Other (comments)     Mouth pain     Pcn [Penicillins] Other (comments)     Oral pain     Sulfa (Sulfonamide Antibiotics) Other (comments)     Oral pain         REVIEW OF SYSTEMS:  Review of Systems   Constitutional: Negative for chills and fever. HENT: Negative for ear pain and sore throat. Eyes: Negative for pain and visual disturbance. Respiratory: Negative for cough and shortness of breath. Cardiovascular: Negative for chest pain and palpitations. Gastrointestinal: Positive for abdominal pain and constipation.  Negative for diarrhea, nausea and vomiting. Genitourinary: Positive for flank pain. Musculoskeletal: Negative for back pain and neck pain. Neurological: Negative for syncope and headaches. Psychiatric/Behavioral: Negative for agitation. The patient is not nervous/anxious. PHYSICAL EXAM:  Vitals:    05/23/19 1102 05/23/19 1538 05/23/19 1539   BP: 151/65 154/80    Pulse: (!) 129     Resp: 20     Temp: 98.6 °F (37 °C)     SpO2: 100%  94%   Weight: 56.2 kg (124 lb)     Height: 5' 4\" (1.626 m)         Physical Exam   Constitutional: She is oriented to person, place, and time. She appears well-developed and well-nourished. No distress. HENT:   Head: Normocephalic and atraumatic. Mouth/Throat: Oropharynx is clear and moist.   Eyes: Pupils are equal, round, and reactive to light. No scleral icterus. Neck: Neck supple. No tracheal deviation present. Cardiovascular: Normal rate, regular rhythm and intact distal pulses. No murmur heard. Pulmonary/Chest: Effort normal and breath sounds normal. No respiratory distress. She has no wheezes. She has no rales. Abdominal: Soft. She exhibits no distension. There is no tenderness (+ TTP to left quadrants, + L CVA TTP). There is no rebound and no guarding. Musculoskeletal: Normal range of motion. She exhibits no deformity. Neurological: She is alert and oriented to person, place, and time. No cranial nerve deficit. No gross neuro deficit   Skin: Skin is warm and dry. No rash noted. She is not diaphoretic. Psychiatric: She has a normal mood and affect. Nursing note and vitals reviewed.       Medications   acetaminophen (TYLENOL) tablet 1,000 mg (1,000 mg Oral Refused 5/23/19 1538)   iopamidol (ISOVUE 300) 61 % contrast injection 100 mL (80 mL IntraVENous Given 5/23/19 1349)       RESULTS:    Labs -   Labs Reviewed   CBC WITH AUTOMATED DIFF - Abnormal; Notable for the following components:       Result Value    RBC 3.69 (*)     HGB 10.8 (*)     HCT 34.3 (*) NEUTROPHILS 84 (*)     LYMPHOCYTES 8 (*)     ABS. LYMPHOCYTES 0.5 (*)     All other components within normal limits   METABOLIC PANEL, COMPREHENSIVE - Abnormal; Notable for the following components:    Glucose 167 (*)     BUN 19 (*)     GFR est AA 51 (*)     GFR est non-AA 42 (*)     Albumin 2.9 (*)     Globulin 4.3 (*)     A-G Ratio 0.7 (*)     All other components within normal limits   URINALYSIS W/ RFLX MICROSCOPIC - Abnormal; Notable for the following components:    Protein 100 (*)     All other components within normal limits   LIPASE   URINE MICROSCOPIC ONLY   POC LACTIC ACID       Radiologic Studies -  Ct Abd Pelv W Cont    Result Date: 5/23/2019  EXAM: CT ABD PELV W CONT CLINICAL INDICATION/HISTORY: Prior fall several weeks ago with persistent left flank and abdominal pain COMPARISON: Pelvic CT 4/11/2019 TECHNIQUE:   CT of the abdomen and pelvis following intravenous contrast administration. Coronal and sagittal reformats were generated and reviewed. One or more dose reduction techniques were used on this CT: automated exposure control, adjustment of the mAs and/or kVp according to patient size, and iterative reconstruction techniques. The specific techniques used on this CT exam have been documented in the patient's electronic medical record. Digital Imaging and Communications in Medicine (DICOM) format image data are available to nonaffiliated external healthcare facilities or entities on a secure, media free, reciprocally searchable basis with patient authorization for at least a 12-month period after this study. _______________ FINDINGS: LOWER THORAX: Small right and tiny left pleural effusions each with adjacent basilar atelectasis. No pleural effusion. Mild global cardiomegaly. No pericardial effusion. LIVER AND BILIARY:  No suspicious liver lesions. No biliary dilation. Gallbladder unremarkable.  SPLEEN:  Normal. PANCREAS: Small hypodense lesions are seen within the pancreatic body and pancreatic body/tail junction (axial images 56 and 55), each measuring 5-6 mm. ADRENALS:  Normal. KIDNEYS: Nonobstructive right renal calculi measure 3-5 mm. No hydronephrosis or evidence of an obstructive uropathy. Incidental subcentimeter left and right kidney cystic lesion(s) warranting no additional dedicated follow-up. No solid or suspicious renal lesion. LYMPH NODES:  No mesenteric or retroperitoneal lymphadenopathy. GI TRACT: Colonic  diverticulosis without evidence of acute inflammation. Small hiatal hernia. Normal caliber small and large bowel loops. No morphology of bowel obstruction. No bowel wall thickening. Normal appendix. PELVIC ORGANS:  Bladder is normal in appearance. No acute abnormality. VASCULATURE: Normal caliber lower thoracic and abdominal aorta with mild atherosclerotic vascular calcification. OTHER:   No ascites or free intraperitoneal air. OSSEOUS STRUCTURES:  Unchanged, mild L2 wedge compression fracture deformity, similar to prior CT of 4/9/2018. Asymmetric superior endplate concavity and anterior vertebral body height loss are also seen in the L1 vertebral body, new since prior CT of 2018. Cortical buckling is also seen along the inferior aspect of the S4 vertebral body, similarly not present on prior CT. Severe multilevel degenerative disk disease and facet arthropathy. There are several levels of high-grade canal and foraminal stenosis throughout lower thoracic and lumbar spine. Extremely advanced degenerative osteoarthropathy of left and right hips, each of which have bone-on-bone abutment, several intra-articular loose bodies, and large marginal osteophytes. _______________     1. Nonobstructive right renal calculi. No hydronephrosis or evidence of an obstructive uropathy. No acute or focal abnormality to explain patient's left flank pain. 2. Subacute appearing L1 and S4 fracture deformities, both of which are new since prior CT of 2018.  3. Small right and tiny left pleural effusions. 4. Indeterminate hypodense subcentimeter pancreatic lesions. Recommend annual CT follow-up to ascertain stability. ECG time 1111  Atrial fibrillation, rate 100. Normal axis. Intervals WNL. No acute ST elevation. MEDICAL DECISION MAKING    DDX: Nephrolithiasis, UTI, pyelonephritis, URSULA, diverticulitis, constipation    80 y.o. female with noted past medical history who presented with left-sided abdominal and flank pain which is sharp and nagging and intermittent over the past 1 week. On exam patient had left-sided abdominal tenderness with left CVA tenderness. She was overall well-appearing and pleasant. Patient's pain is mild and I am not concerned at this time for mesenteric ischemia. ED Course as of May 23 1616   Thu May 23, 2019   1428 No UTI or URSULA.     [KG]   1513 No acute findings on CT to explain patient's left-sided abdominal and flank pain. Her intermittent discomfort is most likely secondary to her constipation, recommend increasing her MiraLAX to twice daily with adequate hydration. [KG]      ED Course User Index  [KG] Beverly Acuna DO     Discussed case with Dr. Yimi Martinez who reports patient's atrial fibrillation is not new, she had this on last admission in 2018. Patient has no new complaints, changes, or physical findings. Results were reviewed with the patient. Pt's questions and concerns were addressed. Care plan was outlined, including follow-up with PCP/specialist and return precautions were discussed. Patient is felt to be stable for discharge at this time. Diagnosis   Clinical Impression:   1. Constipation, unspecified constipation type    2. Left sided abdominal pain    3.  Atrial fibrillation, unspecified type [facility])         Follow-up Information     Follow up With Specialties Details Why [facility] EMERGENCY DEPT Emergency Medicine  If symptoms worsen [address]    Raulito Bertrand MD Internal Medicine Schedule an appointment as soon as possible for a visit  [address] [place] [address]  [phone]            Discharge Medication List as of 5/23/2019  3:37 PM          _______________________________   Attestations:

## 2019-05-23 NOTE — ED TRIAGE NOTES
Liz Elizalde several weeks ago. Placed on tramadol for left flank pain. tx at time for UTI. Didn't finish antibiotics. Feels constipated. Feels like getting weaker.

## 2019-05-24 VITALS
SYSTOLIC BLOOD PRESSURE: 128 MMHG | DIASTOLIC BLOOD PRESSURE: 62 MMHG | TEMPERATURE: 98.2 F | HEART RATE: 77 BPM | OXYGEN SATURATION: 96 %

## 2019-05-26 ENCOUNTER — APPOINTMENT (OUTPATIENT)
Dept: GENERAL RADIOLOGY | Age: 84
End: 2019-05-26
Attending: EMERGENCY MEDICINE
Payer: MEDICARE

## 2019-05-26 ENCOUNTER — HOSPITAL ENCOUNTER (EMERGENCY)
Age: 84
Discharge: HOME | End: 2019-05-26
Attending: EMERGENCY MEDICINE
Payer: MEDICARE

## 2019-05-26 VITALS
DIASTOLIC BLOOD PRESSURE: 85 MMHG | OXYGEN SATURATION: 98 % | TEMPERATURE: 98.7 F | RESPIRATION RATE: 19 BRPM | HEART RATE: 98 BPM | WEIGHT: 124 LBS | BODY MASS INDEX: 21.28 KG/M2 | SYSTOLIC BLOOD PRESSURE: 193 MMHG

## 2019-05-26 DIAGNOSIS — R03.0 ELEVATED BLOOD PRESSURE READING: ICD-10-CM

## 2019-05-26 DIAGNOSIS — T17.308A CHOKING, INITIAL ENCOUNTER: Primary | ICD-10-CM

## 2019-05-26 LAB
ALBUMIN SERPL-MCNC: 3 G/DL (ref 3.4–5)
ALBUMIN/GLOB SERPL: 0.7 {RATIO} (ref 0.8–1.7)
ALP SERPL-CCNC: 92 U/L (ref 45–117)
ALT SERPL-CCNC: 26 U/L (ref 13–56)
ANION GAP SERPL CALC-SCNC: 7 MMOL/L (ref 3–18)
AST SERPL-CCNC: 26 U/L (ref 15–37)
BASOPHILS # BLD: 0 K/UL (ref 0–0.1)
BASOPHILS NFR BLD: 0 % (ref 0–2)
BILIRUB SERPL-MCNC: 0.8 MG/DL (ref 0.2–1)
BUN SERPL-MCNC: 24 MG/DL (ref 7–18)
BUN/CREAT SERPL: 21 (ref 12–20)
CALCIUM SERPL-MCNC: 8.7 MG/DL (ref 8.5–10.1)
CHLORIDE SERPL-SCNC: 105 MMOL/L (ref 100–108)
CO2 SERPL-SCNC: 28 MMOL/L (ref 21–32)
CREAT SERPL-MCNC: 1.15 MG/DL (ref 0.6–1.3)
DIFFERENTIAL METHOD BLD: ABNORMAL
EOSINOPHIL # BLD: 0 K/UL (ref 0–0.4)
EOSINOPHIL NFR BLD: 1 % (ref 0–5)
ERYTHROCYTE [DISTWIDTH] IN BLOOD BY AUTOMATED COUNT: 14.1 % (ref 11.6–14.5)
GLOBULIN SER CALC-MCNC: 4.1 G/DL (ref 2–4)
GLUCOSE SERPL-MCNC: 94 MG/DL (ref 74–99)
HCT VFR BLD AUTO: 31.4 % (ref 35–45)
HGB BLD-MCNC: 10.2 G/DL (ref 12–16)
LACTATE BLD-SCNC: 0.63 MMOL/L (ref 0.4–2)
LYMPHOCYTES # BLD: 0.5 K/UL (ref 0.9–3.6)
LYMPHOCYTES NFR BLD: 10 % (ref 21–52)
MCH RBC QN AUTO: 29.7 PG (ref 24–34)
MCHC RBC AUTO-ENTMCNC: 32.5 G/DL (ref 31–37)
MCV RBC AUTO: 91.5 FL (ref 74–97)
MONOCYTES # BLD: 0.4 K/UL (ref 0.05–1.2)
MONOCYTES NFR BLD: 7 % (ref 3–10)
NEUTS SEG # BLD: 3.9 K/UL (ref 1.8–8)
NEUTS SEG NFR BLD: 82 % (ref 40–73)
PLATELET # BLD AUTO: 274 K/UL (ref 135–420)
PMV BLD AUTO: 9.3 FL (ref 9.2–11.8)
POTASSIUM SERPL-SCNC: 3.9 MMOL/L (ref 3.5–5.5)
PROT SERPL-MCNC: 7.1 G/DL (ref 6.4–8.2)
RBC # BLD AUTO: 3.43 M/UL (ref 4.2–5.3)
SODIUM SERPL-SCNC: 140 MMOL/L (ref 136–145)
WBC # BLD AUTO: 4.8 K/UL (ref 4.6–13.2)

## 2019-05-26 PROCEDURE — 71045 X-RAY EXAM CHEST 1 VIEW: CPT

## 2019-05-26 PROCEDURE — 83605 ASSAY OF LACTIC ACID: CPT

## 2019-05-26 PROCEDURE — 74011250637 HC RX REV CODE- 250/637: Performed by: EMERGENCY MEDICINE

## 2019-05-26 PROCEDURE — 80053 COMPREHEN METABOLIC PANEL: CPT

## 2019-05-26 PROCEDURE — 74011250636 HC RX REV CODE- 250/636: Performed by: EMERGENCY MEDICINE

## 2019-05-26 PROCEDURE — 85025 COMPLETE CBC W/AUTO DIFF WBC: CPT

## 2019-05-26 PROCEDURE — 87040 BLOOD CULTURE FOR BACTERIA: CPT

## 2019-05-26 PROCEDURE — 99285 EMERGENCY DEPT VISIT HI MDM: CPT

## 2019-05-26 RX ORDER — DILTIAZEM HYDROCHLORIDE 240 MG/1
240 CAPSULE, COATED, EXTENDED RELEASE ORAL
Status: COMPLETED | OUTPATIENT
Start: 2019-05-26 | End: 2019-05-26

## 2019-05-26 RX ORDER — METOPROLOL TARTRATE 25 MG/1
25 TABLET, FILM COATED ORAL
Status: COMPLETED | OUTPATIENT
Start: 2019-05-26 | End: 2019-05-26

## 2019-05-26 RX ADMIN — SODIUM CHLORIDE 250 ML: 900 INJECTION, SOLUTION INTRAVENOUS at 11:30

## 2019-05-26 RX ADMIN — DILTIAZEM HYDROCHLORIDE 240 MG: 240 CAPSULE, COATED, EXTENDED RELEASE ORAL at 13:19

## 2019-05-26 RX ADMIN — METOPROLOL TARTRATE 25 MG: 25 TABLET ORAL at 13:19

## 2019-05-26 NOTE — ED PROVIDER NOTES
EMERGENCY DEPARTMENT HISTORY AND PHYSICAL EXAM    10:33 AM      Date: 5/26/2019  Patient Name: Eustacio Curling    History of Presenting Illness     Chief Complaint   Patient presents with    Cough    Chest Congestion         History Provided By: Patient      Additional History (Context): Eustacio Curling is a 80 y.o. female who presents with a complaint of choking prior to calling 911. Patient states that she does have a chronic cough however she felt like she was choking on mucus earlier today and was not able to clear her airway and her caregiver called 911. By the time 911 arrived patient had clear her airway and is now simply complaining of generalized weakness. she denies any chest pain or shortness of breath at present she feels weak because she is 80years old. Social history she does live at home with caregiver, denies alcohol or tobacco use    PCP: Tanisha Edwards MD      Current Facility-Administered Medications   Medication Dose Route Frequency Provider Last Rate Last Dose    dilTIAZem CD (CARDIZEM CD) capsule 240 mg  240 mg Oral NOW Sukhjinder Mcdonough MD        metoprolol tartrate (LOPRESSOR) tablet 25 mg  25 mg Oral NOW Sukhjinder Mcdonough MD         Current Outpatient Medications   Medication Sig Dispense Refill    apixaban (ELIQUIS) 2.5 mg tablet Take 2.5 mg by mouth two (2) times a day.  traMADol (ULTRAM) 50 mg tablet Take 50 mg by mouth two (2) times daily as needed for Pain.  ciprofloxacin HCl (CIPRO) 500 mg tablet Take 500 mg by mouth two (2) times a day.  ergocalciferol, vitamin D2, (DRISDOL PO) Take 1.25 mg by mouth Once every 2 weeks.  polyethylene glycol (MIRALAX) 17 gram packet Take 1 Packet by mouth two (2) times a day. 30 Packet 0    bnfgcqhsr-Ugldkbzv-Imsfo-W.Pet (PREPARATION H MAXIMUM STRENGTH) 0.25-1 % rectal cream Apply 1 Tube to affected area as needed for Hemorrhoids.  1 Tube 0    ergocalciferol (ERGOCALCIFEROL) 50,000 unit capsule Take 50,000 Units by mouth Once every 2 weeks.  docusate sodium (STOOL SOFTENER) 100 mg capsule Take 100 mg by mouth daily as needed for Constipation.  dilTIAZem CD (CARDIZEM CD) 180 mg ER capsule Take 1 Cap by mouth daily. (Patient taking differently: Take 240 mg by mouth daily.) 90 Cap 3    metoprolol tartrate (LOPRESSOR) 25 mg tablet Take 1 Tab by mouth every twelve (12) hours. Indications: hypertension 180 Tab 2    nitroglycerin (NITROSTAT) 0.4 mg SL tablet 1 Tab by SubLINGual route every five (5) minutes as needed for Chest Pain. (Patient taking differently: 1 Tab by SubLINGual route every five (5) minutes as needed for Chest Pain. 3 doses then call 911) 30 Tab 2    acetaminophen (TYLENOL EXTRA STRENGTH) 500 mg tablet Take 2 Tabs by mouth every six (6) hours as needed for Pain. 50 Tab 0    atorvastatin (LIPITOR) 40 mg tablet Take 1 Tab by mouth nightly. 30 Tab 0    pantoprazole (PROTONIX) 40 mg tablet Take 40 mg by mouth daily. Indications: GASTROESOPHAGEAL REFLUX         Past History     Past Medical History:  Past Medical History:   Diagnosis Date    Coronary artery disease     Degenerative joint disease     Degenerative spine disease     Dyslipidemia     Hypertension     Hypertension     Kidney stone     Osteoarthritis        Past Surgical History:  Past Surgical History:   Procedure Laterality Date    HX APPENDECTOMY      HX HYSTERECTOMY         Family History:  Family History   Family history unknown: Yes       Social History:  Social History     Tobacco Use    Smoking status: Never Smoker    Smokeless tobacco: Never Used   Substance Use Topics    Alcohol use: No    Drug use: No       Allergies:   Allergies   Allergen Reactions    Demerol [Meperidine] Other (comments)     Oral pain     Erythromycin Other (comments)    Nitrofurantoin Other (comments)     Mouth pain     Pcn [Penicillins] Other (comments)     Oral pain     Sulfa (Sulfonamide Antibiotics) Other (comments)     Oral pain          Review of Systems       Review of Systems   Constitutional: Negative for chills, diaphoresis and fever. HENT: Positive for congestion, postnasal drip and rhinorrhea. Eyes: Negative for visual disturbance. Respiratory: Positive for cough. Negative for chest tightness and shortness of breath. Cardiovascular: Negative for chest pain. Gastrointestinal: Negative for abdominal pain, diarrhea, nausea and vomiting. Musculoskeletal: Negative for back pain. Skin: Negative for rash. Neurological: Negative for dizziness, syncope and weakness. All other systems reviewed and are negative. Physical Exam     Visit Vitals  /79   Pulse 95   Temp 98.7 °F (37.1 °C)   Resp 19   Wt 56.2 kg (124 lb)   SpO2 97%   BMI 21.28 kg/m²       Physical Exam   Constitutional: She is oriented to person, place, and time. She appears well-developed and well-nourished. No distress. HENT:   Head: Normocephalic and atraumatic. Mouth/Throat: Oropharynx is clear and moist.   Eyes: Pupils are equal, round, and reactive to light. Conjunctivae and EOM are normal. No scleral icterus. Neck: Normal range of motion. Neck supple. Cardiovascular: Normal rate, regular rhythm and normal heart sounds. No murmur heard. Pulmonary/Chest: Effort normal and breath sounds normal. No respiratory distress. Abdominal: Soft. Bowel sounds are normal. She exhibits no distension. There is no tenderness. Musculoskeletal: She exhibits no edema. Lymphadenopathy:     She has no cervical adenopathy. Neurological: She is alert and oriented to person, place, and time. Coordination normal.   Skin: Skin is warm and dry. No rash noted. Psychiatric: She has a normal mood and affect. Her behavior is normal.   Nursing note and vitals reviewed.         Diagnostic Study Results     Labs -  Recent Results (from the past 12 hour(s))   CBC WITH AUTOMATED DIFF    Collection Time: 05/26/19 11:10 AM   Result Value Ref Range    WBC 4.8 4.6 - 13.2 K/uL    RBC 3.43 (L) 4.20 - 5.30 M/uL    HGB 10.2 (L) 12.0 - 16.0 g/dL    HCT 31.4 (L) 35.0 - 45.0 %    MCV 91.5 74.0 - 97.0 FL    MCH 29.7 24.0 - 34.0 PG    MCHC 32.5 31.0 - 37.0 g/dL    RDW 14.1 11.6 - 14.5 %    PLATELET 988 680 - 614 K/uL    MPV 9.3 9.2 - 11.8 FL    NEUTROPHILS 82 (H) 40 - 73 %    LYMPHOCYTES 10 (L) 21 - 52 %    MONOCYTES 7 3 - 10 %    EOSINOPHILS 1 0 - 5 %    BASOPHILS 0 0 - 2 %    ABS. NEUTROPHILS 3.9 1.8 - 8.0 K/UL    ABS. LYMPHOCYTES 0.5 (L) 0.9 - 3.6 K/UL    ABS. MONOCYTES 0.4 0.05 - 1.2 K/UL    ABS. EOSINOPHILS 0.0 0.0 - 0.4 K/UL    ABS. BASOPHILS 0.0 0.0 - 0.1 K/UL    DF AUTOMATED     METABOLIC PANEL, COMPREHENSIVE    Collection Time: 05/26/19 11:10 AM   Result Value Ref Range    Sodium 140 136 - 145 mmol/L    Potassium 3.9 3.5 - 5.5 mmol/L    Chloride 105 100 - 108 mmol/L    CO2 28 21 - 32 mmol/L    Anion gap 7 3.0 - 18 mmol/L    Glucose 94 74 - 99 mg/dL    BUN 24 (H) 7.0 - 18 MG/DL    Creatinine 1.15 0.6 - 1.3 MG/DL    BUN/Creatinine ratio 21 (H) 12 - 20      GFR est AA 53 (L) >60 ml/min/1.73m2    GFR est non-AA 44 (L) >60 ml/min/1.73m2    Calcium 8.7 8.5 - 10.1 MG/DL    Bilirubin, total 0.8 0.2 - 1.0 MG/DL    ALT (SGPT) 26 13 - 56 U/L    AST (SGOT) 26 15 - 37 U/L    Alk.  phosphatase 92 45 - 117 U/L    Protein, total 7.1 6.4 - 8.2 g/dL    Albumin 3.0 (L) 3.4 - 5.0 g/dL    Globulin 4.1 (H) 2.0 - 4.0 g/dL    A-G Ratio 0.7 (L) 0.8 - 1.7     CULTURE, BLOOD    Collection Time: 05/26/19 11:10 AM   Result Value Ref Range    Special Requests: PERIPHERAL      Culture result: PENDING    POC LACTIC ACID    Collection Time: 05/26/19 11:23 AM   Result Value Ref Range    Lactic Acid (POC) 0.63 0.40 - 2.00 mmol/L   CULTURE, BLOOD    Collection Time: 05/26/19 11:25 AM   Result Value Ref Range    Special Requests: PERIPHERAL      Culture result: PENDING        Radiologic Studies -   XR CHEST PORT   Final Result      Right basilar atelectasis, airspace disease, or small pleural effusion            Medical Decision Making   I am the first provider for this patient. I reviewed the vital signs, available nursing notes, past medical history, past surgical history, family history and social history. Vital Signs-Reviewed the patient's vital signs. EKG:    Records Reviewed: Nursing Notes, Old Medical Records, Previous Radiology Studies and Previous Laboratory Studies (Time of Review: 10:33 AM)    ED Course: Progress Notes, Reevaluation, and Consults:      Provider Notes (Medical Decision Making):   MDM  Number of Diagnoses or Management Options  Choking, initial encounter:   Elevated blood pressure reading:   Diagnosis management comments: Possible airway obstruction transiently from some mucus. Patient states that her cough has been clear she is afebrile normal vitals will check chest x-ray rule out a possible aspiration. After discussion with attending radiologist chest x-ray questionable atelectasis versus airspace disease will check labs observe in the emergency department any evidence of increased respiratory distress or aspiration    12:43 PM  Patient had no respiratory distress no cough in the emergency department doubt likely an aspiration at this point blood pressure remains high she did not take her regular a.m. blood pressure meds will give them in the ED and try p.o. challenge       Amount and/or Complexity of Data Reviewed  Clinical lab tests: ordered and reviewed  Tests in the radiology section of CPT®: ordered and reviewed  Independent visualization of images, tracings, or specimens: yes    Risk of Complications, Morbidity, and/or Mortality  Presenting problems: high  Diagnostic procedures: moderate  Management options: moderate              Diagnosis     Clinical Impression:   1. Choking, initial encounter    2.  Elevated blood pressure reading        Disposition: home     Follow-up Information     Follow up With Specialties Details Why [facility] EMERGENCY DEPT Emergency Medicine  As needed, If symptoms worsen [address] [facility]    Tanisha Edwards MD Internal Medicine Schedule an appointment as soon as possible for a visit for ED follow up appointment  [facility] [address] [place] [address]  [phone]             Patient's Medications   Start Taking    No medications on file   Continue Taking    ACETAMINOPHEN (TYLENOL EXTRA STRENGTH) 500 MG TABLET    Take 2 Tabs by mouth every six (6) hours as needed for Pain. APIXABAN (ELIQUIS) 2.5 MG TABLET    Take 2.5 mg by mouth two (2) times a day. ATORVASTATIN (LIPITOR) 40 MG TABLET    Take 1 Tab by mouth nightly. CIPROFLOXACIN HCL (CIPRO) 500 MG TABLET    Take 500 mg by mouth two (2) times a day. DILTIAZEM CD (CARDIZEM CD) 180 MG ER CAPSULE    Take 1 Cap by mouth daily. DOCUSATE SODIUM (STOOL SOFTENER) 100 MG CAPSULE    Take 100 mg by mouth daily as needed for Constipation. ERGOCALCIFEROL (ERGOCALCIFEROL) 50,000 UNIT CAPSULE    Take 50,000 Units by mouth Once every 2 weeks. ERGOCALCIFEROL, VITAMIN D2, (DRISDOL PO)    Take 1.25 mg by mouth Once every 2 weeks. METOPROLOL TARTRATE (LOPRESSOR) 25 MG TABLET    Take 1 Tab by mouth every twelve (12) hours. Indications: hypertension    NITROGLYCERIN (NITROSTAT) 0.4 MG SL TABLET    1 Tab by SubLINGual route every five (5) minutes as needed for Chest Pain. PANTOPRAZOLE (PROTONIX) 40 MG TABLET    Take 40 mg by mouth daily. Indications: GASTROESOPHAGEAL REFLUX    MPNHAOFZL-UYVZWJOJ-WGYHQ-W.PET (PREPARATION H MAXIMUM STRENGTH) 0.25-1 % RECTAL CREAM    Apply 1 Tube to affected area as needed for Hemorrhoids. POLYETHYLENE GLYCOL (MIRALAX) 17 GRAM PACKET    Take 1 Packet by mouth two (2) times a day. TRAMADOL (ULTRAM) 50 MG TABLET    Take 50 mg by mouth two (2) times daily as needed for Pain.    These Medications have changed    No medications on file   Stop Taking    No medications on file _______________________________    Please note that this dictation was completed with [employer], the computer voice recognition software. Quite often unanticipated grammatical, syntax, homophones, and other interpretive errors are inadvertently transcribed by the computer software. Please disregard these errors. Please excuse any errors that have escaped final proofreading.

## 2019-05-26 NOTE — ED TRIAGE NOTES
Pt arrived to ed via ems for c/o cough and chest congestion.   Pt states has had a productive cough \"for a long time\"

## 2019-05-26 NOTE — DISCHARGE INSTRUCTIONS
Patient Education        Elevated Blood Pressure: Care Instructions  Your Care Instructions    Blood pressure is a measure of how hard the blood pushes against the walls of your arteries. It's normal for blood pressure to go up and down throughout the day. But if it stays up over time, you have high blood pressure. Two numbers tell you your blood pressure. The first number is the systolic pressure. It shows how hard the blood pushes when your heart is pumping. The second number is the diastolic pressure. It shows how hard the blood pushes between heartbeats, when your heart is relaxed and filling with blood. An ideal blood pressure in adults is less than 120/80 (say \"120 over 80\"). High blood pressure is 140/90 or higher. You have high blood pressure if your top number is 140 or higher or your bottom number is 90 or higher, or both. The main test for high blood pressure is simple, fast, and painless. To diagnose high blood pressure, your doctor will test your blood pressure at different times. After testing your blood pressure, your doctor may ask you to test it again when you are home. If you are diagnosed with high blood pressure, you can work with your doctor to make a long-term plan to manage it. Follow-up care is a key part of your treatment and safety. Be sure to make and go to all appointments, and call your doctor if you are having problems. It's also a good idea to know your test results and keep a list of the medicines you take. How can you care for yourself at home? · Do not smoke. Smoking increases your risk for heart attack and stroke. If you need help quitting, talk to your doctor about stop-smoking programs and medicines. These can increase your chances of quitting for good. · Stay at a healthy weight. · Try to limit how much sodium you eat to less than 2,300 milligrams (mg) a day. Your doctor may ask you to try to eat less than 1,500 mg a day. · Be physically active.  Get at least 30 minutes of exercise on most days of the week. Walking is a good choice. You also may want to do other activities, such as running, swimming, cycling, or playing tennis or team sports. · Avoid or limit alcohol. Talk to your doctor about whether you can drink any alcohol. · Eat plenty of fruits, vegetables, and low-fat dairy products. Eat less saturated and total fats. · Learn how to check your blood pressure at home. When should you call for help? Call your doctor now or seek immediate medical care if:  ? · Your blood pressure is much higher than normal (such as 180/110 or higher). ? · You think high blood pressure is causing symptoms such as:  ¨ Severe headache. ¨ Blurry vision. ? Watch closely for changes in your health, and be sure to contact your doctor if:  ? · You do not get better as expected. Where can you learn more? Go to http://christ-antwon.info/. Enter J397 in the search box to learn more about \"Elevated Blood Pressure: Care Instructions. \"  Current as of: September 21, 2016  Content Version: 11.4  © 4642-5095 [employer]. Care instructions adapted under license by [employer] (which disclaims liability or warranty for this information). If you have questions about a medical condition or this instruction, always ask your healthcare professional. [address] any warranty or liability for your use of this information.

## 2019-05-26 NOTE — ED NOTES
I have reviewed discharge instructions with the patient. The patient verbalized understanding. Pt in no distress at time of discharge and agreeable to terms of discharge. Pt riding home with pt son.

## 2019-07-26 ENCOUNTER — APPOINTMENT (OUTPATIENT)
Dept: GENERAL RADIOLOGY | Age: 84
End: 2019-07-26
Attending: EMERGENCY MEDICINE
Payer: MEDICARE

## 2019-07-26 ENCOUNTER — HOSPITAL ENCOUNTER (EMERGENCY)
Age: 84
Discharge: HOME | End: 2019-07-26
Attending: EMERGENCY MEDICINE | Admitting: EMERGENCY MEDICINE
Payer: MEDICARE

## 2019-07-26 VITALS
BODY MASS INDEX: 21.17 KG/M2 | WEIGHT: 124 LBS | HEART RATE: 73 BPM | RESPIRATION RATE: 23 BRPM | SYSTOLIC BLOOD PRESSURE: 177 MMHG | HEIGHT: 64 IN | DIASTOLIC BLOOD PRESSURE: 62 MMHG | OXYGEN SATURATION: 99 %

## 2019-07-26 DIAGNOSIS — J41.0 SIMPLE CHRONIC BRONCHITIS (HCC): Primary | ICD-10-CM

## 2019-07-26 LAB
ALBUMIN SERPL-MCNC: 3 G/DL (ref 3.4–5)
ALBUMIN/GLOB SERPL: 0.8 {RATIO} (ref 0.8–1.7)
ALP SERPL-CCNC: 102 U/L (ref 45–117)
ALT SERPL-CCNC: 26 U/L (ref 13–56)
ANION GAP SERPL CALC-SCNC: 7 MMOL/L (ref 3–18)
APPEARANCE UR: CLEAR
AST SERPL-CCNC: 32 U/L (ref 10–38)
BACTERIA URNS QL MICRO: ABNORMAL /HPF
BASOPHILS # BLD: 0 K/UL (ref 0–0.1)
BASOPHILS NFR BLD: 0 % (ref 0–2)
BILIRUB SERPL-MCNC: 0.6 MG/DL (ref 0.2–1)
BILIRUB UR QL: NEGATIVE
BUN SERPL-MCNC: 20 MG/DL (ref 7–18)
BUN/CREAT SERPL: 17 (ref 12–20)
CALCIUM SERPL-MCNC: 8.8 MG/DL (ref 8.5–10.1)
CHLORIDE SERPL-SCNC: 102 MMOL/L (ref 100–111)
CO2 SERPL-SCNC: 30 MMOL/L (ref 21–32)
COLOR UR: YELLOW
CREAT SERPL-MCNC: 1.17 MG/DL (ref 0.6–1.3)
DIFFERENTIAL METHOD BLD: ABNORMAL
EOSINOPHIL # BLD: 0 K/UL (ref 0–0.4)
EOSINOPHIL NFR BLD: 1 % (ref 0–5)
EPITH CASTS URNS QL MICRO: ABNORMAL /LPF (ref 0–5)
ERYTHROCYTE [DISTWIDTH] IN BLOOD BY AUTOMATED COUNT: 12.8 % (ref 11.6–14.5)
GLOBULIN SER CALC-MCNC: 4 G/DL (ref 2–4)
GLUCOSE SERPL-MCNC: 157 MG/DL (ref 74–99)
GLUCOSE UR STRIP.AUTO-MCNC: NEGATIVE MG/DL
HCT VFR BLD AUTO: 33.8 % (ref 35–45)
HGB BLD-MCNC: 10.6 G/DL (ref 12–16)
HGB UR QL STRIP: NEGATIVE
HYALINE CASTS URNS QL MICRO: ABNORMAL /LPF (ref 0–2)
KETONES UR QL STRIP.AUTO: NEGATIVE MG/DL
LEUKOCYTE ESTERASE UR QL STRIP.AUTO: ABNORMAL
LYMPHOCYTES # BLD: 0.4 K/UL (ref 0.9–3.6)
LYMPHOCYTES NFR BLD: 8 % (ref 21–52)
MCH RBC QN AUTO: 28.5 PG (ref 24–34)
MCHC RBC AUTO-ENTMCNC: 31.4 G/DL (ref 31–37)
MCV RBC AUTO: 90.9 FL (ref 74–97)
MONOCYTES # BLD: 0.4 K/UL (ref 0.05–1.2)
MONOCYTES NFR BLD: 7 % (ref 3–10)
NEUTS SEG # BLD: 4.2 K/UL (ref 1.8–8)
NEUTS SEG NFR BLD: 84 % (ref 40–73)
NITRITE UR QL STRIP.AUTO: NEGATIVE
PH UR STRIP: 5.5 [PH] (ref 5–8)
PLATELET # BLD AUTO: 294 K/UL (ref 135–420)
PMV BLD AUTO: 9.6 FL (ref 9.2–11.8)
POTASSIUM SERPL-SCNC: 4 MMOL/L (ref 3.5–5.5)
PROT SERPL-MCNC: 7 G/DL (ref 6.4–8.2)
PROT UR STRIP-MCNC: >300 MG/DL
RBC # BLD AUTO: 3.72 M/UL (ref 4.2–5.3)
RBC #/AREA URNS HPF: ABNORMAL /HPF (ref 0–5)
SODIUM SERPL-SCNC: 139 MMOL/L (ref 136–145)
SP GR UR REFRACTOMETRY: 1.03 (ref 1–1.03)
TROPONIN I SERPL-MCNC: <0.02 NG/ML (ref 0–0.04)
UROBILINOGEN UR QL STRIP.AUTO: 1 EU/DL (ref 0.2–1)
WBC # BLD AUTO: 5 K/UL (ref 4.6–13.2)
WBC URNS QL MICRO: ABNORMAL /HPF (ref 0–4)

## 2019-07-26 PROCEDURE — 71046 X-RAY EXAM CHEST 2 VIEWS: CPT

## 2019-07-26 PROCEDURE — 85025 COMPLETE CBC W/AUTO DIFF WBC: CPT

## 2019-07-26 PROCEDURE — 99283 EMERGENCY DEPT VISIT LOW MDM: CPT

## 2019-07-26 PROCEDURE — 93005 ELECTROCARDIOGRAM TRACING: CPT

## 2019-07-26 PROCEDURE — 84484 ASSAY OF TROPONIN QUANT: CPT

## 2019-07-26 PROCEDURE — 80053 COMPREHEN METABOLIC PANEL: CPT

## 2019-07-26 PROCEDURE — 81001 URINALYSIS AUTO W/SCOPE: CPT

## 2019-07-26 RX ORDER — BENZONATATE 100 MG/1
100 CAPSULE ORAL
Qty: 30 CAP | Refills: 0 | Status: SHIPPED | OUTPATIENT
Start: 2019-07-26 | End: 2019-08-02

## 2019-07-26 NOTE — DISCHARGE INSTRUCTIONS
Patient Education        Verónica Peraza in the Lungs: Anatomy Sketch    Current as of: September 5, 2018  Content Version: 12.1  © 2753-6141 [employer]. Care instructions adapted under license by [employer] (which disclaims liability or warranty for this information). If you have questions about a medical condition or this instruction, always ask your healthcare professional. [place] [address] any warranty or liability for your use of this information.

## 2019-07-26 NOTE — ED TRIAGE NOTES
Patient states generalized weakness and cough with nasal congestion for the last week. Patient not coughing during triage but keeps clearing her throat. Patient came from home with an aid at bedside.

## 2019-07-26 NOTE — ED PROVIDER NOTES
EMERGENCY DEPARTMENT HISTORY AND PHYSICAL EXAM    11:36 AM      Date: 7/26/2019  Patient Name: Pamela Dickey    History of Presenting Illness     Chief Complaint   Patient presents with    Fatigue    Cough    Nasal Congestion         History Provided By: patient    Additional History (Context): Pamela Dickey is a 80 y.o. female presents with 3 days of shortness of breath and a cough, that feels like she should be able to get something up but is unable to. No chest pain abdominal pain fever vomiting or diarrhea. No exacerbating or alleviating factors. PCP: Veronica Doan MD    Chief Complaint:   Duration:    Timing:    Location:   Quality:   Severity:   Modifying Factors:   Associated Symptoms:       Current Outpatient Medications   Medication Sig Dispense Refill    benzonatate (TESSALON PERLES) 100 mg capsule Take 1 Cap by mouth three (3) times daily as needed for Cough for up to 7 days. 30 Cap 0    dextromethorphan-guaiFENesin (ROBITUSSIN-DM)  mg/5 mL syrup Take 10 mL by mouth every four (4) hours as needed for Cough. SIG:  As prescribed during the daytime. 240 mL 0    apixaban (ELIQUIS) 2.5 mg tablet Take 2.5 mg by mouth two (2) times a day.  traMADol (ULTRAM) 50 mg tablet Take 50 mg by mouth two (2) times daily as needed for Pain.  ciprofloxacin HCl (CIPRO) 500 mg tablet Take 500 mg by mouth two (2) times a day.  ergocalciferol, vitamin D2, (DRISDOL PO) Take 1.25 mg by mouth Once every 2 weeks.  polyethylene glycol (MIRALAX) 17 gram packet Take 1 Packet by mouth two (2) times a day. 30 Packet 0    eompemgyc-Qywjdkxi-Zwzwk-W.Pet (PREPARATION H MAXIMUM STRENGTH) 0.25-1 % rectal cream Apply 1 Tube to affected area as needed for Hemorrhoids. 1 Tube 0    ergocalciferol (ERGOCALCIFEROL) 50,000 unit capsule Take 50,000 Units by mouth Once every 2 weeks.  docusate sodium (STOOL SOFTENER) 100 mg capsule Take 100 mg by mouth daily as needed for Constipation.       dilTIAZem CD (CARDIZEM CD) 180 mg ER capsule Take 1 Cap by mouth daily. (Patient taking differently: Take 240 mg by mouth daily.) 90 Cap 3    metoprolol tartrate (LOPRESSOR) 25 mg tablet Take 1 Tab by mouth every twelve (12) hours. Indications: hypertension 180 Tab 2    nitroglycerin (NITROSTAT) 0.4 mg SL tablet 1 Tab by SubLINGual route every five (5) minutes as needed for Chest Pain. (Patient taking differently: 1 Tab by SubLINGual route every five (5) minutes as needed for Chest Pain. 3 doses then call 911) 30 Tab 2    acetaminophen (TYLENOL EXTRA STRENGTH) 500 mg tablet Take 2 Tabs by mouth every six (6) hours as needed for Pain. 50 Tab 0    atorvastatin (LIPITOR) 40 mg tablet Take 1 Tab by mouth nightly. 30 Tab 0    pantoprazole (PROTONIX) 40 mg tablet Take 40 mg by mouth daily. Indications: GASTROESOPHAGEAL REFLUX         Past History     Past Medical History:  Past Medical History:   Diagnosis Date    Coronary artery disease     Degenerative joint disease     Degenerative spine disease     Dyslipidemia     Hypertension     Hypertension     Kidney stone     Osteoarthritis        Past Surgical History:  Past Surgical History:   Procedure Laterality Date    HX APPENDECTOMY      HX HYSTERECTOMY         Family History:  Family History   Family history unknown: Yes       Social History:  Social History     Tobacco Use    Smoking status: Never Smoker    Smokeless tobacco: Never Used   Substance Use Topics    Alcohol use: No    Drug use: No       Allergies: Allergies   Allergen Reactions    Demerol [Meperidine] Other (comments)     Oral pain     Erythromycin Other (comments)    Nitrofurantoin Other (comments)     Mouth pain     Pcn [Penicillins] Other (comments)     Oral pain     Sulfa (Sulfonamide Antibiotics) Other (comments)     Oral pain          Review of Systems     Review of Systems   Constitutional: Negative for diaphoresis and fever. HENT: Negative for congestion and sore throat.     Eyes: Negative for pain and itching. Respiratory: Positive for cough and shortness of breath. Cardiovascular: Negative for chest pain and palpitations. Gastrointestinal: Negative for abdominal pain and diarrhea. Endocrine: Negative for polydipsia and polyuria. Genitourinary: Negative for dysuria and hematuria. Musculoskeletal: Negative for arthralgias and myalgias. Skin: Negative for rash and wound. Neurological: Negative for seizures and syncope. Hematological: Does not bruise/bleed easily. Psychiatric/Behavioral: Negative for agitation and hallucinations. Physical Exam       Patient Vitals for the past 12 hrs:   Pulse Resp BP SpO2   07/26/19 1112   177/62    07/26/19 1104 88 16  98 %       Physical Exam   Constitutional: She appears well-developed and well-nourished. No distress. HENT:   Head: Normocephalic and atraumatic. Eyes: Conjunctivae are normal. No scleral icterus. Neck: Normal range of motion. Neck supple. No JVD present. Cardiovascular: Normal rate, regular rhythm and normal heart sounds. 4 intact extremity pulses   Pulmonary/Chest: Effort normal and breath sounds normal.   Few coughs during the exam.   Abdominal: Soft. She exhibits no mass. There is no tenderness. Musculoskeletal: Normal range of motion. Lymphadenopathy:     She has no cervical adenopathy. Neurological: She is alert. Skin: Skin is warm and dry. Nursing note and vitals reviewed.         Diagnostic Study Results   Labs -  Recent Results (from the past 12 hour(s))   CBC WITH AUTOMATED DIFF    Collection Time: 07/26/19 11:10 AM   Result Value Ref Range    WBC 5.0 4.6 - 13.2 K/uL    RBC 3.72 (L) 4.20 - 5.30 M/uL    HGB 10.6 (L) 12.0 - 16.0 g/dL    HCT 33.8 (L) 35.0 - 45.0 %    MCV 90.9 74.0 - 97.0 FL    MCH 28.5 24.0 - 34.0 PG    MCHC 31.4 31.0 - 37.0 g/dL    RDW 12.8 11.6 - 14.5 %    PLATELET 493 690 - 446 K/uL    MPV 9.6 9.2 - 11.8 FL    NEUTROPHILS 84 (H) 40 - 73 %    LYMPHOCYTES 8 (L) 21 - 52 %    MONOCYTES 7 3 - 10 %    EOSINOPHILS 1 0 - 5 %    BASOPHILS 0 0 - 2 %    ABS. NEUTROPHILS 4.2 1.8 - 8.0 K/UL    ABS. LYMPHOCYTES 0.4 (L) 0.9 - 3.6 K/UL    ABS. MONOCYTES 0.4 0.05 - 1.2 K/UL    ABS. EOSINOPHILS 0.0 0.0 - 0.4 K/UL    ABS. BASOPHILS 0.0 0.0 - 0.1 K/UL    DF AUTOMATED     METABOLIC PANEL, COMPREHENSIVE    Collection Time: 07/26/19 11:10 AM   Result Value Ref Range    Sodium 139 136 - 145 mmol/L    Potassium 4.0 3.5 - 5.5 mmol/L    Chloride 102 100 - 111 mmol/L    CO2 30 21 - 32 mmol/L    Anion gap 7 3.0 - 18 mmol/L    Glucose 157 (H) 74 - 99 mg/dL    BUN 20 (H) 7.0 - 18 MG/DL    Creatinine 1.17 0.6 - 1.3 MG/DL    BUN/Creatinine ratio 17 12 - 20      GFR est AA 52 (L) >60 ml/min/1.73m2    GFR est non-AA 43 (L) >60 ml/min/1.73m2    Calcium 8.8 8.5 - 10.1 MG/DL    Bilirubin, total 0.6 0.2 - 1.0 MG/DL    ALT (SGPT) 26 13 - 56 U/L    AST (SGOT) 32 10 - 38 U/L    Alk. phosphatase 102 45 - 117 U/L    Protein, total 7.0 6.4 - 8.2 g/dL    Albumin 3.0 (L) 3.4 - 5.0 g/dL    Globulin 4.0 2.0 - 4.0 g/dL    A-G Ratio 0.8 0.8 - 1.7     TROPONIN I    Collection Time: 07/26/19 11:10 AM   Result Value Ref Range    Troponin-I, QT <0.02 0.0 - 0.045 NG/ML   EKG, 12 LEAD, INITIAL    Collection Time: 07/26/19 11:59 AM   Result Value Ref Range    Ventricular Rate 67 BPM    Atrial Rate 67 BPM    P-R Interval 284 ms    QRS Duration 90 ms    Q-T Interval 398 ms    QTC Calculation (Bezet) 420 ms    Calculated P Axis 68 degrees    Calculated R Axis 67 degrees    Calculated T Axis 62 degrees    Diagnosis       Sinus rhythm with 1st degree AV block  Otherwise normal ECG  When compared with ECG of 23-MAY-2019 11:11,  Sinus rhythm has replaced Atrial flutter  Vent.  rate has decreased BY  33 BPM     URINALYSIS W/ RFLX MICROSCOPIC    Collection Time: 07/26/19 12:15 PM   Result Value Ref Range    Color YELLOW      Appearance CLEAR      Specific gravity 1.030 1.003 - 1.030      pH (UA) 5.5 5.0 - 8.0      Protein >300 (A) NEG mg/dL Glucose NEGATIVE  NEG mg/dL    Ketone NEGATIVE  NEG mg/dL    Bilirubin NEGATIVE  NEG      Blood NEGATIVE  NEG      Urobilinogen 1.0 0.2 - 1.0 EU/dL    Nitrites NEGATIVE  NEG      Leukocyte Esterase TRACE (A) NEG     URINE MICROSCOPIC ONLY    Collection Time: 07/26/19 12:15 PM   Result Value Ref Range    WBC 4 to 1 0 - 4 /hpf    RBC 2 to 4 0 - 5 /hpf    Epithelial cells 3+ 0 - 5 /lpf    Bacteria 2+ (A) NEG /hpf    Hyaline cast 2 to 4 0 - 2 /lpf       Radiologic Studies -   XR CHEST PA LAT   Final Result   IMPRESSION:      Mild cardiac enlargement and trace right pleural effusion without superimposed   acute radiographic abnormality. Xr Chest Pa Lat    Result Date: 7/26/2019  EXAM: XR CHEST PA LAT CLINICAL INDICATION/HISTORY: Shortness of breath -Additional: None COMPARISON: Several prior exams, most recently May 26, 2019 TECHNIQUE: PA and lateral views of the chest _______________ FINDINGS: HEART AND MEDIASTINUM: Cardiac size is mildly enlarged. LUNGS AND PLEURAL SPACES: No focal pneumonic consolidation or pneumothorax. Likely trace right pleural effusion. BONY THORAX AND SOFT TISSUES: No acute osseous abnormality. Degenerative changes of the shoulder girdles bilaterally. _______________     IMPRESSION: Mild cardiac enlargement and trace right pleural effusion without superimposed acute radiographic abnormality. Medications ordered:   Medications - No data to display      Medical Decision Making   Initial Medical Decision Making and DDx:     Consider pneumonia CHF ACS bronchitis. PE and aortic disease unlikely. ED Course: Progress Notes, Reevaluation, and Consults:     1:29 PM Pt reevaluated at this time. Discussed results and findings, as well as, diagnosis and plan for discharge. Follow up with doctors/services listed. Return to the emergency department for alarming symptoms. Pt verbalizes understanding and agreement with plan. All questions addressed.     No fever elevated white count or infectious process seen on the chest x-ray. Do not think this represents UTI, no symptoms, and prominent symptoms of cough. Will treat symptomatically. Likely viral or allergic bronchitis. Do not think antibiotic risks warrant treatment with antibiotics at this point. She will follow-up with PCP to ensure she is not getting worse. Appears stable and comfortable in the bed, few coughs. I am the first provider for this patient. I reviewed the vital signs, available nursing notes, past medical history, past surgical history, family history and social history. Patient Vitals for the past 12 hrs:   Pulse Resp BP SpO2   07/26/19 1112   177/62    07/26/19 1104 88 16  98 %       Vital Signs-Reviewed the patient's vital signs. Pulse Oximetry Analysis, Cardiac Monitor, 12 lead ekg:     Twelve-lead EKG at 1159, sinus rhythm first-degree AV block at 69 no acute process. Telemetry sinus rhythm at 88, oximetry 98% room air  Interpreted by the EP. Records Reviewed: Nursing notes reviewed (Time of Review: 11:36 AM)    Procedures:   Critical Care Time:   Aspirin: (was aspirin given for stroke?)    Diagnosis     Clinical Impression:   1. Simple chronic bronchitis ([facility] [address]        Disposition: Discharged      Follow-up Information     Follow up With Specialties Details Why Contact Info    Michael Edwards MD Internal Medicine In 2 days  [facility] [address] [place], [place] [id number] [phone]             Patient's Medications   Start Taking    BENZONATATE (TESSALON PERLES) 100 MG CAPSULE    Take 1 Cap by mouth three (3) times daily as needed for Cough for up to 7 days. DEXTROMETHORPHAN-GUAIFENESIN (ROBITUSSIN-DM)  MG/5 ML SYRUP    Take 10 mL by mouth every four (4) hours as needed for Cough. SIG:  As prescribed during the daytime. Continue Taking    ACETAMINOPHEN (TYLENOL EXTRA STRENGTH) 500 MG TABLET    Take 2 Tabs by mouth every six (6) hours as needed for Pain. APIXABAN (ELIQUIS) 2.5 MG TABLET    Take 2.5 mg by mouth two (2) times a day. ATORVASTATIN (LIPITOR) 40 MG TABLET    Take 1 Tab by mouth nightly. CIPROFLOXACIN HCL (CIPRO) 500 MG TABLET    Take 500 mg by mouth two (2) times a day. DILTIAZEM CD (CARDIZEM CD) 180 MG ER CAPSULE    Take 1 Cap by mouth daily. DOCUSATE SODIUM (STOOL SOFTENER) 100 MG CAPSULE    Take 100 mg by mouth daily as needed for Constipation. ERGOCALCIFEROL (ERGOCALCIFEROL) 50,000 UNIT CAPSULE    Take 50,000 Units by mouth Once every 2 weeks. ERGOCALCIFEROL, VITAMIN D2, (DRISDOL PO)    Take 1.25 mg by mouth Once every 2 weeks. METOPROLOL TARTRATE (LOPRESSOR) 25 MG TABLET    Take 1 Tab by mouth every twelve (12) hours. Indications: hypertension    NITROGLYCERIN (NITROSTAT) 0.4 MG SL TABLET    1 Tab by SubLINGual route every five (5) minutes as needed for Chest Pain. PANTOPRAZOLE (PROTONIX) 40 MG TABLET    Take 40 mg by mouth daily. Indications: GASTROESOPHAGEAL REFLUX    [id number].PET (PREPARATION H MAXIMUM STRENGTH) 0.25-1 % RECTAL CREAM    Apply 1 Tube to affected area as needed for Hemorrhoids. POLYETHYLENE GLYCOL (MIRALAX) 17 GRAM PACKET    Take 1 Packet by mouth two (2) times a day. TRAMADOL (ULTRAM) 50 MG TABLET    Take 50 mg by mouth two (2) times daily as needed for Pain.    These Medications have changed    No medications on file   Stop Taking    No medications on file     _______________________________    Notes:    Sabi Jensen MD using Dragon dictation      _______________________________

## 2019-07-28 LAB
ATRIAL RATE: 67 BPM
CALCULATED P AXIS, ECG09: 68 DEGREES
CALCULATED R AXIS, ECG10: 67 DEGREES
CALCULATED T AXIS, ECG11: 62 DEGREES
DIAGNOSIS, 93000: NORMAL
P-R INTERVAL, ECG05: 284 MS
Q-T INTERVAL, ECG07: 398 MS
QRS DURATION, ECG06: 90 MS
QTC CALCULATION (BEZET), ECG08: 420 MS
VENTRICULAR RATE, ECG03: 67 BPM

## 2020-05-07 ENCOUNTER — HOSPITAL ENCOUNTER (EMERGENCY)
Age: 85
Discharge: HOME | End: 2020-05-07
Attending: EMERGENCY MEDICINE
Payer: MEDICARE

## 2020-05-07 ENCOUNTER — APPOINTMENT (OUTPATIENT)
Dept: GENERAL RADIOLOGY | Age: 85
End: 2020-05-07
Attending: PHYSICIAN ASSISTANT
Payer: MEDICARE

## 2020-05-07 VITALS
SYSTOLIC BLOOD PRESSURE: 165 MMHG | OXYGEN SATURATION: 96 % | HEART RATE: 78 BPM | RESPIRATION RATE: 19 BRPM | TEMPERATURE: 98.1 F | DIASTOLIC BLOOD PRESSURE: 86 MMHG

## 2020-05-07 DIAGNOSIS — R05.9 COUGH: Primary | ICD-10-CM

## 2020-05-07 DIAGNOSIS — I10 ESSENTIAL HYPERTENSION: ICD-10-CM

## 2020-05-07 DIAGNOSIS — J90 PLEURAL EFFUSION: ICD-10-CM

## 2020-05-07 LAB
ANION GAP SERPL CALC-SCNC: 5 MMOL/L (ref 3–18)
BASOPHILS # BLD: 0 K/UL (ref 0–0.1)
BASOPHILS NFR BLD: 1 % (ref 0–2)
BNP SERPL-MCNC: 2440 PG/ML (ref 0–1800)
BUN SERPL-MCNC: 26 MG/DL (ref 7–18)
BUN/CREAT SERPL: 19 (ref 12–20)
CALCIUM SERPL-MCNC: 8.7 MG/DL (ref 8.5–10.1)
CHLORIDE SERPL-SCNC: 107 MMOL/L (ref 100–111)
CK MB CFR SERPL CALC: NORMAL % (ref 0–4)
CK MB CFR SERPL CALC: NORMAL % (ref 0–4)
CK MB SERPL-MCNC: <1 NG/ML (ref 5–25)
CK MB SERPL-MCNC: <1 NG/ML (ref 5–25)
CK SERPL-CCNC: 41 U/L (ref 26–192)
CK SERPL-CCNC: 52 U/L (ref 26–192)
CO2 SERPL-SCNC: 27 MMOL/L (ref 21–32)
CREAT SERPL-MCNC: 1.36 MG/DL (ref 0.6–1.3)
DIFFERENTIAL METHOD BLD: ABNORMAL
EOSINOPHIL # BLD: 0 K/UL (ref 0–0.4)
EOSINOPHIL NFR BLD: 1 % (ref 0–5)
ERYTHROCYTE [DISTWIDTH] IN BLOOD BY AUTOMATED COUNT: 12.8 % (ref 11.6–14.5)
GLUCOSE SERPL-MCNC: 150 MG/DL (ref 74–99)
HCT VFR BLD AUTO: 34.4 % (ref 35–45)
HGB BLD-MCNC: 11 G/DL (ref 12–16)
LYMPHOCYTES # BLD: 0.7 K/UL (ref 0.9–3.6)
LYMPHOCYTES NFR BLD: 16 % (ref 21–52)
MCH RBC QN AUTO: 29.8 PG (ref 24–34)
MCHC RBC AUTO-ENTMCNC: 32 G/DL (ref 31–37)
MCV RBC AUTO: 93.2 FL (ref 74–97)
MONOCYTES # BLD: 0.4 K/UL (ref 0.05–1.2)
MONOCYTES NFR BLD: 10 % (ref 3–10)
NEUTS SEG # BLD: 3 K/UL (ref 1.8–8)
NEUTS SEG NFR BLD: 72 % (ref 40–73)
PLATELET # BLD AUTO: 247 K/UL (ref 135–420)
PMV BLD AUTO: 9.9 FL (ref 9.2–11.8)
POTASSIUM SERPL-SCNC: 4.2 MMOL/L (ref 3.5–5.5)
RBC # BLD AUTO: 3.69 M/UL (ref 4.2–5.3)
SODIUM SERPL-SCNC: 139 MMOL/L (ref 136–145)
TROPONIN I SERPL-MCNC: <0.02 NG/ML (ref 0–0.04)
TROPONIN I SERPL-MCNC: <0.02 NG/ML (ref 0–0.04)
WBC # BLD AUTO: 4.2 K/UL (ref 4.6–13.2)

## 2020-05-07 PROCEDURE — 82550 ASSAY OF CK (CPK): CPT

## 2020-05-07 PROCEDURE — 74011250637 HC RX REV CODE- 250/637: Performed by: PHYSICIAN ASSISTANT

## 2020-05-07 PROCEDURE — 85025 COMPLETE CBC W/AUTO DIFF WBC: CPT

## 2020-05-07 PROCEDURE — 99284 EMERGENCY DEPT VISIT MOD MDM: CPT

## 2020-05-07 PROCEDURE — 93005 ELECTROCARDIOGRAM TRACING: CPT

## 2020-05-07 PROCEDURE — 83880 ASSAY OF NATRIURETIC PEPTIDE: CPT

## 2020-05-07 PROCEDURE — 80048 BASIC METABOLIC PNL TOTAL CA: CPT

## 2020-05-07 PROCEDURE — 71045 X-RAY EXAM CHEST 1 VIEW: CPT

## 2020-05-07 PROCEDURE — 87635 SARS-COV-2 COVID-19 AMP PRB: CPT

## 2020-05-07 RX ORDER — METOPROLOL TARTRATE 25 MG/1
25 TABLET, FILM COATED ORAL
Status: COMPLETED | OUTPATIENT
Start: 2020-05-07 | End: 2020-05-07

## 2020-05-07 RX ORDER — BENZONATATE 100 MG/1
200 CAPSULE ORAL
Qty: 21 CAP | Refills: 0 | Status: SHIPPED | OUTPATIENT
Start: 2020-05-07 | End: 2020-05-14

## 2020-05-07 RX ORDER — BENZONATATE 100 MG/1
200 CAPSULE ORAL
Status: COMPLETED | OUTPATIENT
Start: 2020-05-07 | End: 2020-05-07

## 2020-05-07 RX ORDER — DOXYCYCLINE 100 MG/1
100 CAPSULE ORAL 2 TIMES DAILY
Qty: 14 CAP | Refills: 0 | Status: SHIPPED | OUTPATIENT
Start: 2020-05-07 | End: 2020-05-14

## 2020-05-07 RX ADMIN — METOPROLOL TARTRATE 25 MG: 25 TABLET, FILM COATED ORAL at 14:14

## 2020-05-07 RX ADMIN — BENZONATATE 200 MG: 100 CAPSULE ORAL at 13:41

## 2020-05-07 NOTE — ED PROVIDER NOTES
EMERGENCY DEPARTMENT HISTORY AND PHYSICAL EXAM    11:55 AM      Date: 5/7/2020  Patient Name: Mariano Corbett    History of Presenting Illness     Chief Complaint   Patient presents with    Cough         History Provided By: Patient    Additional History (Context): Mariano Corbett is a 80 y.o. female with hx of HTN, HLD, CAD, nephrolithiasis, atrial fibrillation who presents with complaint of productive cough x1 month. Patient denies fever or chills, diaphoresis, chest pain, shortness of breath, orthopnea, leg edema, nausea or vomiting. Denies recent travel, sick contacts, known exposure to [employer]. Patient lives at home and notes she has only seen her caretakers since [email or web address] outbreak. Has tried over-the-counter medication for symptoms without relief. PCP: Beto Still MD    Current Outpatient Medications   Medication Sig Dispense Refill    benzonatate (Tessalon Perles) 100 mg capsule Take 2 Caps by mouth three (3) times daily as needed for Cough for up to 7 days. 21 Cap 0    doxycycline (MONODOX) 100 mg capsule Take 1 Cap by mouth two (2) times a day for 7 days. 14 Cap 0    apixaban (ELIQUIS) 2.5 mg tablet Take 2.5 mg by mouth two (2) times a day.  traMADol (ULTRAM) 50 mg tablet Take 50 mg by mouth two (2) times daily as needed for Pain.  ciprofloxacin HCl (CIPRO) 500 mg tablet Take 500 mg by mouth two (2) times a day.  ergocalciferol, vitamin D2, (DRISDOL PO) Take 1.25 mg by mouth Once every 2 weeks.  polyethylene glycol (MIRALAX) 17 gram packet Take 1 Packet by mouth two (2) times a day. 30 Packet 0    wmfwouzym-Fvlcontx-Rjgod-W.Pet (PREPARATION H MAXIMUM STRENGTH) 0.25-1 % rectal cream Apply 1 Tube to affected area as needed for Hemorrhoids. 1 Tube 0    ergocalciferol (ERGOCALCIFEROL) 50,000 unit capsule Take 50,000 Units by mouth Once every 2 weeks.  docusate sodium (STOOL SOFTENER) 100 mg capsule Take 100 mg by mouth daily as needed for Constipation.       dilTIAZem CD (CARDIZEM CD) 180 mg ER capsule Take 1 Cap by mouth daily. (Patient taking differently: Take 240 mg by mouth daily.) 90 Cap 3    metoprolol tartrate (LOPRESSOR) 25 mg tablet Take 1 Tab by mouth every twelve (12) hours. Indications: hypertension 180 Tab 2    nitroglycerin (NITROSTAT) 0.4 mg SL tablet 1 Tab by SubLINGual route every five (5) minutes as needed for Chest Pain. (Patient taking differently: 1 Tab by SubLINGual route every five (5) minutes as needed for Chest Pain. 3 doses then call 911) 30 Tab 2    acetaminophen (TYLENOL EXTRA STRENGTH) 500 mg tablet Take 2 Tabs by mouth every six (6) hours as needed for Pain. 50 Tab 0    atorvastatin (LIPITOR) 40 mg tablet Take 1 Tab by mouth nightly. 30 Tab 0    pantoprazole (PROTONIX) 40 mg tablet Take 40 mg by mouth daily. Indications: GASTROESOPHAGEAL REFLUX         Past History     Past Medical History:  Past Medical History:   Diagnosis Date    Coronary artery disease     Degenerative joint disease     Degenerative spine disease     Dyslipidemia     Hypertension     Hypertension     Kidney stone     Osteoarthritis        Past Surgical History:  Past Surgical History:   Procedure Laterality Date    HX APPENDECTOMY      HX HYSTERECTOMY         Family History:  Family History   Family history unknown: Yes       Social History:  Social History     Tobacco Use    Smoking status: Never Smoker    Smokeless tobacco: Never Used   Substance Use Topics    Alcohol use: No    Drug use: No       Allergies: Allergies   Allergen Reactions    Demerol [Meperidine] Other (comments)     Oral pain     Erythromycin Other (comments)    Nitrofurantoin Other (comments)     Mouth pain     Pcn [Penicillins] Other (comments)     Oral pain     Sulfa (Sulfonamide Antibiotics) Other (comments)     Oral pain          Review of Systems       Review of Systems   Constitutional: Negative for chills and fever. Respiratory: Positive for cough.  Negative for shortness of breath. Cardiovascular: Negative for chest pain. Gastrointestinal: Negative for abdominal pain, nausea and vomiting. Skin: Negative for rash. Neurological: Negative for weakness. All other systems reviewed and are negative. Physical Exam     Visit Vitals  /86   Pulse 78   Temp 98.1 °F (36.7 °C)   Resp 19   SpO2 96%         Physical Exam  Vitals signs and nursing note reviewed. Constitutional:       General: She is not in acute distress. Appearance: Normal appearance. She is well-developed. She is not ill-appearing, toxic-appearing or diaphoretic. HENT:      Head: Normocephalic and atraumatic. Nose: Nose normal.      Mouth/Throat:      Mouth: Mucous membranes are moist.   Neck:      Musculoskeletal: Normal range of motion and neck supple. No neck rigidity. Cardiovascular:      Rate and Rhythm: Normal rate and regular rhythm. Heart sounds: Normal heart sounds. No murmur. No friction rub. No gallop. Pulmonary:      Effort: Pulmonary effort is normal. No respiratory distress. Breath sounds: Normal breath sounds. No wheezing or rales. Comments: Dry cough throughout examination  Musculoskeletal: Normal range of motion. Right lower leg: No edema. Left lower leg: No edema. Lymphadenopathy:      Cervical: No cervical adenopathy. Skin:     General: Skin is warm. Findings: No rash. Neurological:      Mental Status: She is alert.            Diagnostic Study Results     Labs -  Recent Results (from the past 12 hour(s))   CBC WITH AUTOMATED DIFF    Collection Time: 05/07/20 11:15 AM   Result Value Ref Range    WBC 4.2 (L) 4.6 - 13.2 K/uL    RBC 3.69 (L) 4.20 - 5.30 M/uL    HGB 11.0 (L) 12.0 - 16.0 g/dL    HCT 34.4 (L) 35.0 - 45.0 %    MCV 93.2 74.0 - 97.0 FL    MCH 29.8 24.0 - 34.0 PG    MCHC 32.0 31.0 - 37.0 g/dL    RDW 12.8 11.6 - 14.5 %    PLATELET 145 117 - 625 K/uL    MPV 9.9 9.2 - 11.8 FL    NEUTROPHILS 72 40 - 73 %    LYMPHOCYTES 16 (L) 21 - 52 % MONOCYTES 10 3 - 10 %    EOSINOPHILS 1 0 - 5 %    BASOPHILS 1 0 - 2 %    ABS. NEUTROPHILS 3.0 1.8 - 8.0 K/UL    ABS. LYMPHOCYTES 0.7 (L) 0.9 - 3.6 K/UL    ABS. MONOCYTES 0.4 0.05 - 1.2 K/UL    ABS. EOSINOPHILS 0.0 0.0 - 0.4 K/UL    ABS.  BASOPHILS 0.0 0.0 - 0.1 K/UL    DF AUTOMATED     METABOLIC PANEL, BASIC    Collection Time: 05/07/20 11:15 AM   Result Value Ref Range    Sodium 139 136 - 145 mmol/L    Potassium 4.2 3.5 - 5.5 mmol/L    Chloride 107 100 - 111 mmol/L    CO2 27 21 - 32 mmol/L    Anion gap 5 3.0 - 18 mmol/L    Glucose 150 (H) 74 - 99 mg/dL    BUN 26 (H) 7.0 - 18 MG/DL    Creatinine 1.36 (H) 0.6 - 1.3 MG/DL    BUN/Creatinine ratio 19 12 - 20      GFR est AA 44 (L) >60 ml/min/1.73m2    GFR est non-AA 36 (L) >60 ml/min/1.73m2    Calcium 8.7 8.5 - 10.1 MG/DL   CARDIAC PANEL,(CK, CKMB & TROPONIN)    Collection Time: 05/07/20 11:15 AM   Result Value Ref Range    CK 41 26 - 192 U/L    CK - MB <1.0 <3.6 ng/ml    CK-MB Index  0.0 - 4.0 %     CALCULATION NOT PERFORMED WHEN RESULT IS BELOW LINEAR LIMIT    Troponin-I, QT <0.02 0.0 - 0.045 NG/ML   NT-PRO BNP    Collection Time: 05/07/20 11:15 AM   Result Value Ref Range    NT pro-BNP 2,440 (H) 0 - 1,800 PG/ML   EKG, 12 LEAD, SUBSEQUENT    Collection Time: 05/07/20  1:27 PM   Result Value Ref Range    Ventricular Rate 97 BPM    Atrial Rate 119 BPM    QRS Duration 86 ms    Q-T Interval 350 ms    QTC Calculation (Bezet) 444 ms    Calculated R Axis 73 degrees    Calculated T Axis 67 degrees    Diagnosis       Undetermined rhythm  Otherwise normal ECG  When compared with ECG of 26-JUL-2019 11:59,  Current undetermined rhythm precludes rhythm comparison, needs review     CARDIAC PANEL,(CK, CKMB & TROPONIN)    Collection Time: 05/07/20  2:20 PM   Result Value Ref Range    CK 52 26 - 192 U/L    CK - MB <1.0 <3.6 ng/ml    CK-MB Index  0.0 - 4.0 %     CALCULATION NOT PERFORMED WHEN RESULT IS BELOW LINEAR LIMIT    Troponin-I, QT <0.02 0.0 - 0.045 NG/ML       Radiologic Studies -   XR CHEST PORT   Final Result   IMPRESSION:      Hypoinflated lungs. Small bilateral effusions with adjacent atelectasis. No   pneumothorax. Medical Decision Making   I am the first provider for this patient. I reviewed the vital signs, available nursing notes, past medical history, past surgical history, family history and social history. Vital Signs-Reviewed the patient's vital signs. Pulse Oximetry Analysis -  97 on room air    Cardiac Monitor:  Rate: 80  Rhythm: NSR     Records Reviewed: Nursing Notes, Old Medical Records and Previous electrocardiograms (Time of Review: 11:55 AM)    ED Course: Progress Notes, Reevaluation, and Consults:  3:23 PM Reviewed results with patient. Resting comfortably, no distress. Ready to go home. Discussed need for close outpatient follow-up this week for reassessment. Discussed strict return precautions, including fever, chest pain, shortness of breath, or any other medical concerns. Pt in agreement with plan. Provider Notes (Medical Decision Making): 26-year-old female who presents to the ED due to productive cough x1 month. Patient denies fever or chills, chest pain, shortness of breath. Denies recent travel, sick contacts, known exposure to [employer]. Patient is afebrile, nontoxic-appearing, looks well. 96% on room air. EKG with evidence of atrial fibrillation, troponin negative x2. BNP mildly elevated. Chest x-ray with evidence of small b/l pleural effusions. COVID test sent and pending. Patient resting comfortably throughout ED stay without hypoxia or respiratory distress. Do not suspect ACS, PE, or aortic pathology. Patient is stable for discharge with antibiotics, symptomatic management, and close outpatient follow-up for further assessment. Diagnosis     Clinical Impression:   1. Cough    2. Essential hypertension    3.  Pleural effusion        Disposition: home     Follow-up Information     Follow up With Specialties Details Why Contact Info    [facility] EMERGENCY DEPT Emergency Medicine  If symptoms worsen [id number] [place] MD Internal Medicine In 2 days  [address] [facility] [place] [address]  [phone]             Patient's Medications   Start Taking    BENZONATATE (TESSALON PERLES) 100 MG CAPSULE    Take 2 Caps by mouth three (3) times daily as needed for Cough for up to 7 days. DOXYCYCLINE (MONODOX) 100 MG CAPSULE    Take 1 Cap by mouth two (2) times a day for 7 days. Continue Taking    ACETAMINOPHEN (TYLENOL EXTRA STRENGTH) 500 MG TABLET    Take 2 Tabs by mouth every six (6) hours as needed for Pain. APIXABAN (ELIQUIS) 2.5 MG TABLET    Take 2.5 mg by mouth two (2) times a day. ATORVASTATIN (LIPITOR) 40 MG TABLET    Take 1 Tab by mouth nightly. CIPROFLOXACIN HCL (CIPRO) 500 MG TABLET    Take 500 mg by mouth two (2) times a day. DILTIAZEM CD (CARDIZEM CD) 180 MG ER CAPSULE    Take 1 Cap by mouth daily. DOCUSATE SODIUM (STOOL SOFTENER) 100 MG CAPSULE    Take 100 mg by mouth daily as needed for Constipation. ERGOCALCIFEROL (ERGOCALCIFEROL) 50,000 UNIT CAPSULE    Take 50,000 Units by mouth Once every 2 weeks. ERGOCALCIFEROL, VITAMIN D2, (DRISDOL PO)    Take 1.25 mg by mouth Once every 2 weeks. METOPROLOL TARTRATE (LOPRESSOR) 25 MG TABLET    Take 1 Tab by mouth every twelve (12) hours. Indications: hypertension    NITROGLYCERIN (NITROSTAT) 0.4 MG SL TABLET    1 Tab by SubLINGual route every five (5) minutes as needed for Chest Pain. PANTOPRAZOLE (PROTONIX) 40 MG TABLET    Take 40 mg by mouth daily. Indications: GASTROESOPHAGEAL REFLUX    HEVRJECZB-ADSAVOSB-HTUPZ-W.PET (PREPARATION H MAXIMUM STRENGTH) 0.25-1 % RECTAL CREAM    Apply 1 Tube to affected area as needed for Hemorrhoids. POLYETHYLENE GLYCOL (MIRALAX) 17 GRAM PACKET    Take 1 Packet by mouth two (2) times a day.     TRAMADOL (ULTRAM) 50 MG TABLET    Take 50 mg by mouth two (2) times daily as needed for Pain. These Medications have changed    No medications on file   Stop Taking    DEXTROMETHORPHAN-GUAIFENESIN (ROBITUSSIN-DM)  MG/5 ML SYRUP    Take 10 mL by mouth every four (4) hours as needed for Cough. SIG:  As prescribed during the daytime. Dictation disclaimer:  Please note that this dictation was completed with [employer]IS, the computer voice recognition software. Quite often unanticipated grammatical, syntax, homophones, and other interpretive errors are inadvertently transcribed by the computer software. Please disregard these errors. Please excuse any errors that have escaped final proofreading.

## 2020-05-07 NOTE — DISCHARGE INSTRUCTIONS
Patient Education   Take medication as prescribed. Follow-up with your primary care physician within 2 days for reassessment. Bring the results from this visit with you for their review. Return to the ED immediately for any new, worsening, or persistent symptoms, including chest pain, shortness of breath, fever, or any other medical concerns. HOME ISOLATION PRECAUTIONS DURING COVID-19 OUTBREAK (per CDC guidelines)    1) Stay home except to get medical care:  People who are mildly ill with COVID-19 are able to isolate at home during their illness. You should restrict activities outside your home, except for getting medical care. Do not go to work, school, or public areas. Avoid using public transportation, ride-sharing, or taxis. 2) Separate yourself from other people and animals in your home  People: As much as possible, you should stay in a specific room and away from other people in your home. Also, you should use a separate bathroom, if available. 3) Animals: You should restrict contact with pets and other animals while you are sick with COVID-19, just like you would around other people. Although there have not been reports of pets or other animals becoming sick with COVID-19, it is still recommended that people sick with COVID-19 limit contact with animals until more information is known about the virus. When possible, have another member of your household care for your animals while you are sick. If you are sick with COVID-19, avoid contact with your pet, including petting, snuggling, being kissed or licked, and sharing food. If you must care for your pet or be around animals while you are sick, wash your hands before and after you interact with pets and wear a facemask. See COVID-19 and Animals for more information. 4) Call ahead before visiting your doctor  If you have a medical appointment, call the healthcare provider and tell them that you have or may have COVID-19.  This will help the healthcare providers office take steps to keep other people from getting infected or exposed. 5) Wear a facemask  You should wear a facemask when you are around other people (e.g., sharing a room or vehicle) or pets and before you enter a healthcare providers office. If you are not able to wear a facemask (for example, because it causes trouble breathing), then people who live with you should not stay in the same room with you, or they should wear a facemask if they enter your room. 6) Cover your coughs and sneezes  Cover your mouth and nose with a tissue when you cough or sneeze. Throw used tissues in a lined trash can. Immediately wash your hands with soap and water for at least 20 seconds or, if soap and water are not available, clean your hands with an alcohol-based hand  that contains at least 60% alcohol. 7) Clean your hands often  Wash your hands often with soap and water for at least 20 seconds, especially after blowing your nose, coughing, or sneezing; going to the bathroom; and before eating or preparing food. If soap and water are not readily available, use an alcohol-based hand  with at least 60% alcohol, covering all surfaces of your hands and rubbing them together until they feel dry. 8) Soap and water are the best option if hands are visibly dirty. Avoid touching your eyes, nose, and mouth with unwashed hands. 9) Avoid sharing personal household items  You should not share dishes, drinking glasses, cups, eating utensils, towels, or bedding with other people or pets in your home. After using these items, they should be washed thoroughly with soap and water. 10) Clean all high-touch surfaces everyday  High touch surfaces include counters, tabletops, doorknobs, bathroom fixtures, toilets, phones, keyboards, tablets, and bedside tables. Also, clean any surfaces that may have blood, stool, or body fluids on them.  Use a household cleaning spray or wipe, according to the label instructions. Labels contain instructions for safe and effective use of the cleaning product including precautions you should take when applying the product, such as wearing gloves and making sure you have good ventilation during use of the product. 11) Monitor your symptoms  Seek prompt medical attention if your illness is worsening (e.g., difficulty breathing). Before seeking care, call your healthcare provider and tell them that you have, or are being evaluated for, COVID-19. Put on a facemask before you enter the facility. These steps will help the healthcare providers office to keep other people in the office or waiting room from getting infected or exposed. Ask your healthcare provider to call the local or state health department. Persons who are placed under active monitoring or facilitated self-monitoring should follow instructions provided by their local health department or occupational health professionals, as appropriate. When working with your local health department check their available hours. 12) If you have a medical emergency and need to call 911, notify the dispatch personnel that you have, or are being evaluated for COVID-19. If possible, put on a facemask before emergency medical services arrive. 13) Discontinuing home isolation  Patients with confirmed COVID-19 should remain under home isolation precautions until the risk of secondary transmission to others is thought to be low. The decision to discontinue home isolation precautions should be made on a case-by-case basis, in consultation with healthcare providers and state and local health departments.       Recommended precautions for household members, intimate partners, and caregivers in a nonhealthcare setting of  A patient with symptomatic laboratory-confirmed COVID-19   or   a patient under investigation  Household members, intimate partners, and caregivers in a nonhealthcare setting may have close contact2 with a person with symptomatic, laboratory-confirmed COVID-19 or a person under investigation. Close contacts should monitor their health; they should call their healthcare provider right away if they develop symptoms suggestive of COVID-19 (e.g., fever, cough, shortness of breath)     Close contacts should also follow these recommendations:   Make sure that you understand and can help the patient follow their healthcare provider's instructions for medication(s) and care. You should help the patient with basic needs in the home and provide support for getting groceries, prescriptions, and other personal needs.  Monitor the patient's symptoms. If the patient is getting sicker, call his or her healthcare provider and tell them that the patient has laboratory-confirmed COVID-19. This will help the healthcare provider's office take steps to keep other people in the office or waiting room from getting infected. Ask the healthcare provider to call the local or [facility] health department for additional guidance. If the patient has a medical emergency and you need to call 911, notify the dispatch personnel that the patient has, or is being evaluated for COVID-19.   Household members should stay in another room or be  from the patient as much as possible. Household members should use a separate bedroom and bathroom, if available.  Prohibit visitors who do not have an essential need to be in the home.  Make sure that shared spaces in the home have good air flow, such as by an air conditioner or an opened window, weather permitting.  Perform hand hygiene frequently. Wash your hands often with soap and water for at least 20 seconds or use an alcohol-based hand  that contains 60 to 95% alcohol, covering all surfaces of your hands and rubbing them together until they feel dry. Soap and water should be used preferentially if hands are visibly dirty.    You and the patient should wear a facemask if you are in the same room.  Wear a disposable facemask and gloves when you touch or have contact with the patient's blood, stool, or body fluids, such as saliva, sputum, nasal mucus, vomit, urine. o Throw out disposable facemasks and gloves after using them. Do not reuse. o When removing personal protective equipment, first remove and dispose of gloves. Then, immediately clean your hands with soap and water or alcohol-based hand . Next, remove and dispose of facemask, and immediately clean your hands again with soap and water or alcohol-based hand . [address] thoroughly.  o Immediately remove and wash clothes or bedding that have blood, stool, or body fluids on them.  o Wear disposable gloves while handling soiled items and keep soiled items away from your body. Clean your hands (with soap and water or an alcohol-based hand ) immediately after removing your gloves. o Read and follow directions on labels of laundry or clothing items and detergent. In general, using a normal laundry detergent according to washing machine instructions and dry thoroughly using the warmest temperatures recommended on the clothing label.  Discuss any additional questions with your state or local health department or healthcare provider. Footnotes  2Close contact is defined as--  a) being within approximately 6 feet (2 meters) of a COVID-19 case for a prolonged period of time; close contact can occur while caring for, living with, visiting, or sharing a health care waiting area or room with a COVID-19 case  - or -  b) having direct contact with infectious secretions of a COVID-19 case (e.g., being coughed on). Learning About Coronavirus [phone])  Coronavirus [phone]): Overview  What is coronavirus (COVID-19)? The coronavirus disease (COVID-19) is caused by a virus. It is an illness that was first found in [place], [place], in December 2019. It has since spread worldwide.   The virus can cause fever, cough, and trouble breathing. In severe cases, it can cause pneumonia and make it hard to breathe without help. It can cause death. Coronaviruses are a large group of viruses. They cause the common cold. They also cause more serious illnesses like Middle East respiratory syndrome (MERS) and severe acute respiratory syndrome (SARS). COVID-19 is caused by a novel coronavirus. That means it's a new type that has not been seen in people before. This virus spreads person-to-person through droplets from coughing and sneezing. It can also spread when you are close to someone who is infected. And it can spread when you touch something that has the virus on it, such as a doorknob or a tabletop. What can you do to protect yourself from coronavirus (COVID-19)? The best way to protect yourself from getting sick is to:  · Avoid areas where there is an outbreak. · Avoid contact with people who may be infected. · Wash your hands often with soap or alcohol-based hand sanitizers. · Avoid crowds and try to stay at least 6 feet away from other people. · Wash your hands often, especially after you cough or sneeze. Use soap and water, and scrub for at least 20 seconds. If soap and water aren't available, use an alcohol-based hand . · Avoid touching your mouth, nose, and eyes. What can you do to avoid spreading the virus to others? To help avoid spreading the virus to others:  · Cover your mouth with a tissue when you cough or sneeze. Then throw the tissue in the trash. · Use a disinfectant to clean things that you touch often. · Stay home if you are sick or have been exposed to the virus. Don't go to school, work, or public areas. And don't use public transportation. · If you are sick:  ? Leave your home only if you need to get medical care. But call the doctor's office first so they know you're coming.  And wear a face mask, if you have one.  ? If you have a face mask, wear it whenever you're around other people. It can help stop the spread of the virus when you cough or sneeze. ? Clean and disinfect your home every day. Use household  and disinfectant wipes or sprays. Take special care to clean things that you grab with your hands. These include doorknobs, remote controls, phones, and handles on your refrigerator and microwave. And don't forget countertops, tabletops, bathrooms, and computer keyboards. When to call for help  Call 911 anytime you think you may need emergency care. For example, call if:  · You have severe trouble breathing. (You can't talk at all.)  · You have constant chest pain or pressure. · You are severely dizzy or lightheaded. · You are confused or can't think clearly. · Your face and lips have a blue color. · You pass out (lose consciousness) or are very hard to wake up. Call your doctor now if you develop symptoms such as:  · Shortness of breath. · Fever. · Cough. If you need to get care, call ahead to the doctor's office for instructions before you go. Make sure you wear a face mask, if you have one, to prevent exposing other people to the virus. Where can you get the latest information? The following health organizations are tracking and studying this virus. Their websites contain the most up-to-date information. Lesia Brice also learn what to do if you think you may have been exposed to the virus. · U.S. Centers for Disease Control and Prevention (CDC): The CDC provides updated news about the disease and travel advice. The website also tells you how to prevent the spread of infection. www.cdc.gov  · World Health Organization [facility]): WHO offers information about the virus outbreaks. WHO also has travel advice. www.who.int  Current as of: April 1, 2020               Content Version: 12.4  © 5063-6832 Health[employer].    Care instructions adapted under license by your healthcare professional. If you have questions about a medical condition or this instruction, always ask your healthcare professional. [place] [address] any warranty or liability for your use of this information. Patient Education        Cough: Care Instructions  Your Care Instructions    A cough is your body's response to something that bothers your throat or airways. Many things can cause a cough. You might cough because of a cold or the flu, bronchitis, or asthma. Smoking, postnasal drip, allergies, and stomach acid that backs up into your throat also can cause coughs. A cough is a symptom, not a disease. Most coughs stop when the cause, such as a cold, goes away. You can take a few steps at home to cough less and feel better. Follow-up care is a key part of your treatment and safety. Be sure to make and go to all appointments, and call your doctor if you are having problems. It's also a good idea to know your test results and keep a list of the medicines you take. How can you care for yourself at home? · Drink lots of water and other fluids. This helps thin the mucus and soothes a dry or sore throat. Honey or lemon juice in hot water or tea may ease a dry cough. · Take cough medicine as directed by your doctor. · Prop up your head on pillows to help you breathe and ease a dry cough. · Try cough drops to soothe a dry or sore throat. Cough drops don't stop a cough. Medicine-flavored cough drops are no better than candy-flavored drops or hard candy. · Do not smoke. Avoid secondhand smoke. If you need help quitting, talk to your doctor about stop-smoking programs and medicines. These can increase your chances of quitting for good. When should you call for help? Call 911 anytime you think you may need emergency care.  For example, call if:    · You have severe trouble breathing.    Call your doctor now or seek immediate medical care if:    · You cough up blood.     · You have new or worse trouble breathing.     · You have a new or higher fever.     · You have a new rash.   Debra Prima closely for changes in your health, and be sure to contact your doctor if:    · You cough more deeply or more often, especially if you notice more mucus or a change in the color of your mucus.     · You have new symptoms, such as a sore throat, an earache, or sinus pain.     · You do not get better as expected. Where can you learn more? Go to http://christ-antwon.info/  Enter D279 in the search box to learn more about \"Cough: Care Instructions. \"  Current as of: June 9, 2019Content Version: 12.4  © 7458-0624 [employer]. Care instructions adapted under license by [employer] (which disclaims liability or warranty for this information). If you have questions about a medical condition or this instruction, always ask your healthcare professional. [address] any warranty or liability for your use of this information. Patient Education        High Blood Pressure: Care Instructions  Overview    It's normal for blood pressure to go up and down throughout the day. But if it stays up, you have high blood pressure. Another name for high blood pressure is hypertension. Despite what a lot of people think, high blood pressure usually doesn't cause headaches or make you feel dizzy or lightheaded. It usually has no symptoms. But it does increase your risk of stroke, heart attack, and other problems. You and your doctor will talk about your risks of these problems based on your blood pressure. Your doctor will give you a goal for your blood pressure. Your goal will be based on your health and your age. Lifestyle changes, such as eating healthy and being active, are always important to help lower blood pressure. You might also take medicine to reach your blood pressure goal.  Follow-up care is a key part of your treatment and safety. Be sure to make and go to all appointments, and call your doctor if you are having problems.  It's also a good idea to know your test results and keep a list of the medicines you take. How can you care for yourself at home? Medical treatment  · If you stop taking your medicine, your blood pressure will go back up. You may take one or more types of medicine to lower your blood pressure. Be safe with medicines. Take your medicine exactly as prescribed. Call your doctor if you think you are having a problem with your medicine. · Talk to your doctor before you start taking aspirin every day. Aspirin can help certain people lower their risk of a heart attack or stroke. But taking aspirin isn't right for everyone, because it can cause serious bleeding. · See your doctor regularly. You may need to see the doctor more often at first or until your blood pressure comes down. · If you are taking blood pressure medicine, talk to your doctor before you take decongestants or anti-inflammatory medicine, such as ibuprofen. Some of these medicines can raise blood pressure. · Learn how to check your blood pressure at home. Lifestyle changes  · Stay at a healthy weight. This is especially important if you put on weight around the waist. Losing even 10 pounds can help you lower your blood pressure. · If your doctor recommends it, get more exercise. Walking is a good choice. Bit by bit, increase the amount you walk every day. Try for at least 30 minutes on most days of the week. You also may want to swim, bike, or do other activities. · Avoid or limit alcohol. Talk to your doctor about whether you can drink any alcohol. · Try to limit how much sodium you eat to less than 2,300 milligrams (mg) a day. Your doctor may ask you to try to eat less than 1,500 mg a day. · Eat plenty of fruits (such as bananas and oranges), vegetables, legumes, whole grains, and low-fat dairy products. · Lower the amount of saturated fat in your diet. Saturated fat is found in animal products such as milk, cheese, and meat.  Limiting these foods may help you lose weight and also lower your risk for heart disease. · Do not smoke. Smoking increases your risk for heart attack and stroke. If you need help quitting, talk to your doctor about stop-smoking programs and medicines. These can increase your chances of quitting for good. When should you call for help? Call  911 anytime you think you may need emergency care. This may mean having symptoms that suggest that your blood pressure is causing a serious heart or blood vessel problem. Your blood pressure may be over 180/120.   For example, call  911 if:    · You have symptoms of a heart attack. These may include:  ? Chest pain or pressure, or a strange feeling in the chest.  ? Sweating. ? Shortness of breath. ? Nausea or vomiting. ? Pain, pressure, or a strange feeling in the back, neck, jaw, or upper belly or in one or both shoulders or arms. ? Lightheadedness or sudden weakness. ? A fast or irregular heartbeat.     · You have symptoms of a stroke. These may include:  ? Sudden numbness, tingling, weakness, or loss of movement in your face, arm, or leg, especially on only one side of your body. ? Sudden vision changes. ? Sudden trouble speaking. ? Sudden confusion or trouble understanding simple statements. ? Sudden problems with walking or balance. ? A sudden, severe headache that is different from past headaches.     · You have severe back or belly pain.    Do not wait until your blood pressure comes down on its own. Get help right away.   Call your doctor now or seek immediate care if:    · Your blood pressure is much higher than normal (such as 180/120 or higher), but you don't have symptoms.     · You think high blood pressure is causing symptoms, such as:  ? Severe headache.  ? Blurry vision.    Watch closely for changes in your health, and be sure to contact your doctor if:    · Your blood pressure measures higher than your doctor recommends at least 2 times.  That means the top number is higher or the bottom number is higher, or both.     · You think you may be having side effects from your blood pressure medicine. Where can you learn more? Go to http://christ-antwon.info/  Enter [id number] in the search box to learn more about \"High Blood Pressure: Care Instructions. \"  Current as of: December 15, 2019Content Version: 12.4  © 4417-2734 Healthwise, Incorporated. Care instructions adapted under license by [employer] (which disclaims liability or warranty for this information). If you have questions about a medical condition or this instruction, always ask your healthcare professional. [place] [address] any warranty or liability for your use of this information.

## 2020-05-08 ENCOUNTER — PATIENT OUTREACH (OUTPATIENT)
Dept: CASE MANAGEMENT | Age: 85
End: 2020-05-08

## 2020-05-08 LAB
ATRIAL RATE: 108 BPM
ATRIAL RATE: 119 BPM
CALCULATED R AXIS, ECG10: 107 DEGREES
CALCULATED R AXIS, ECG10: 73 DEGREES
CALCULATED T AXIS, ECG11: 60 DEGREES
CALCULATED T AXIS, ECG11: 67 DEGREES
DIAGNOSIS, 93000: NORMAL
DIAGNOSIS, 93000: NORMAL
Q-T INTERVAL, ECG07: 344 MS
Q-T INTERVAL, ECG07: 350 MS
QRS DURATION, ECG06: 86 MS
QRS DURATION, ECG06: 86 MS
QTC CALCULATION (BEZET), ECG08: 432 MS
QTC CALCULATION (BEZET), ECG08: 444 MS
VENTRICULAR RATE, ECG03: 95 BPM
VENTRICULAR RATE, ECG03: 97 BPM

## 2020-05-10 LAB — SARS-COV-2, COV2NT: NOT DETECTED

## 2020-05-11 ENCOUNTER — PATIENT OUTREACH (OUTPATIENT)
Dept: CASE MANAGEMENT | Age: 85
End: 2020-05-11

## 2020-05-11 NOTE — PROGRESS NOTES
Patient contacted regarding COVID-19  risk. Care Transition Nurse/ Ambulatory Care Manager contacted the patient by telephone to perform post discharge assessment. Verified name and  with patient as identifiers. Provided introduction to self, and explanation of the CTN/ACM role, and reason for call due to risk factors for infection and/or exposure to COVID-19. Symptoms reviewed with patient who verbalized the following symptoms: cough. Due to no new or worsening symptoms encounter was not routed to provider for escalation. Patient has following risk factors of: 81 y/o. CTN/ACM reviewed discharge instructions, medical action plan and red flags such as increased shortness of breath, increasing fever and signs of decompensation with patient who verbalized understanding. Discussed exposure protocols and quarantine with CDC Guidelines What to do if you are sick with coronavirus disease .  Patient was given an opportunity for questions and concerns. The patient agrees to contact the [facility] exposure line [phone], [facility]  ([phone]) and PCP office for questions related to their healthcare. CTN/ACM provided contact information for future needs. Reviewed and educated patient on any new and changed medications related to discharge diagnosis. Patient/family/caregiver given information for [employer] and agrees to enroll no  Patient's preferred e-mail:  NA  Patient's preferred phone number: NA  Based on Loop alert triggers, patient will be contacted by nurse care manager for worsening symptoms. Plan for follow-up call in 5-7 days based on severity of symptoms and risk factors.

## 2020-09-10 ENCOUNTER — HOSPITAL ENCOUNTER (INPATIENT)
Age: 85
LOS: 2 days | Discharge: HOME | DRG: 293 | End: 2020-09-12
Attending: EMERGENCY MEDICINE | Admitting: HOSPITALIST
Payer: MEDICARE

## 2020-09-10 ENCOUNTER — APPOINTMENT (OUTPATIENT)
Dept: GENERAL RADIOLOGY | Age: 85
DRG: 293 | End: 2020-09-10
Attending: PHYSICIAN ASSISTANT
Payer: MEDICARE

## 2020-09-10 DIAGNOSIS — M79.89 LEG SWELLING: ICD-10-CM

## 2020-09-10 DIAGNOSIS — R06.01 ORTHOPNEA: Primary | ICD-10-CM

## 2020-09-10 DIAGNOSIS — R05.9 COUGH: ICD-10-CM

## 2020-09-10 PROBLEM — I50.9 CHF (CONGESTIVE HEART FAILURE) (HCC): Status: ACTIVE | Noted: 2020-09-10

## 2020-09-10 PROBLEM — I48.91 ATRIAL FIBRILLATION (HCC): Status: ACTIVE | Noted: 2020-09-10

## 2020-09-10 LAB
ANION GAP SERPL CALC-SCNC: 19 MMOL/L (ref 3–18)
BASOPHILS # BLD: 0 K/UL (ref 0–0.1)
BASOPHILS NFR BLD: 0 % (ref 0–2)
BNP SERPL-MCNC: 5150 PG/ML (ref 0–1800)
BUN SERPL-MCNC: 23 MG/DL (ref 7–18)
BUN/CREAT SERPL: 17 (ref 12–20)
CALCIUM SERPL-MCNC: 8.6 MG/DL (ref 8.5–10.1)
CHLORIDE SERPL-SCNC: 98 MMOL/L (ref 100–111)
CO2 SERPL-SCNC: 28 MMOL/L (ref 21–32)
CREAT SERPL-MCNC: 1.34 MG/DL (ref 0.6–1.3)
DIFFERENTIAL METHOD BLD: ABNORMAL
EOSINOPHIL # BLD: 0.1 K/UL (ref 0–0.4)
EOSINOPHIL NFR BLD: 1 % (ref 0–5)
ERYTHROCYTE [DISTWIDTH] IN BLOOD BY AUTOMATED COUNT: 13.2 % (ref 11.6–14.5)
GLUCOSE SERPL-MCNC: 105 MG/DL (ref 74–99)
HCT VFR BLD AUTO: 31.6 % (ref 35–45)
HGB BLD-MCNC: 10.3 G/DL (ref 12–16)
LYMPHOCYTES # BLD: 1 K/UL (ref 0.9–3.6)
LYMPHOCYTES NFR BLD: 22 % (ref 21–52)
MCH RBC QN AUTO: 30.4 PG (ref 24–34)
MCHC RBC AUTO-ENTMCNC: 32.6 G/DL (ref 31–37)
MCV RBC AUTO: 93.2 FL (ref 74–97)
MONOCYTES # BLD: 0.3 K/UL (ref 0.05–1.2)
MONOCYTES NFR BLD: 7 % (ref 3–10)
NEUTS SEG # BLD: 3.1 K/UL (ref 1.8–8)
NEUTS SEG NFR BLD: 70 % (ref 40–73)
PLATELET # BLD AUTO: 252 K/UL (ref 135–420)
PMV BLD AUTO: 10.1 FL (ref 9.2–11.8)
POTASSIUM SERPL-SCNC: 3.8 MMOL/L (ref 3.5–5.5)
RBC # BLD AUTO: 3.39 M/UL (ref 4.2–5.3)
SODIUM SERPL-SCNC: 145 MMOL/L (ref 136–145)
TROPONIN I SERPL-MCNC: <0.02 NG/ML (ref 0–0.04)
WBC # BLD AUTO: 4.5 K/UL (ref 4.6–13.2)

## 2020-09-10 PROCEDURE — 96374 THER/PROPH/DIAG INJ IV PUSH: CPT

## 2020-09-10 PROCEDURE — 74011250637 HC RX REV CODE- 250/637: Performed by: HOSPITALIST

## 2020-09-10 PROCEDURE — 99284 EMERGENCY DEPT VISIT MOD MDM: CPT

## 2020-09-10 PROCEDURE — 80048 BASIC METABOLIC PNL TOTAL CA: CPT

## 2020-09-10 PROCEDURE — 85025 COMPLETE CBC W/AUTO DIFF WBC: CPT

## 2020-09-10 PROCEDURE — 84484 ASSAY OF TROPONIN QUANT: CPT

## 2020-09-10 PROCEDURE — 83880 ASSAY OF NATRIURETIC PEPTIDE: CPT

## 2020-09-10 PROCEDURE — 74011250636 HC RX REV CODE- 250/636: Performed by: PHYSICIAN ASSISTANT

## 2020-09-10 PROCEDURE — 77030021352 HC CBL LD SYS DISP COVD -B

## 2020-09-10 PROCEDURE — 65660000000 HC RM CCU STEPDOWN

## 2020-09-10 PROCEDURE — 93005 ELECTROCARDIOGRAM TRACING: CPT

## 2020-09-10 PROCEDURE — 71045 X-RAY EXAM CHEST 1 VIEW: CPT

## 2020-09-10 RX ORDER — ATORVASTATIN CALCIUM 20 MG/1
40 TABLET, FILM COATED ORAL
Status: DISCONTINUED | OUTPATIENT
Start: 2020-09-10 | End: 2020-09-12 | Stop reason: HOSPADM

## 2020-09-10 RX ORDER — ONDANSETRON 2 MG/ML
4 INJECTION INTRAMUSCULAR; INTRAVENOUS
Status: DISCONTINUED | OUTPATIENT
Start: 2020-09-10 | End: 2020-09-12 | Stop reason: HOSPADM

## 2020-09-10 RX ORDER — ACETAMINOPHEN 325 MG/1
650 TABLET ORAL
Status: DISCONTINUED | OUTPATIENT
Start: 2020-09-10 | End: 2020-09-12 | Stop reason: HOSPADM

## 2020-09-10 RX ORDER — ACETAMINOPHEN 650 MG/1
650 SUPPOSITORY RECTAL
Status: DISCONTINUED | OUTPATIENT
Start: 2020-09-10 | End: 2020-09-12 | Stop reason: HOSPADM

## 2020-09-10 RX ORDER — DILTIAZEM HYDROCHLORIDE 120 MG/1
240 CAPSULE, COATED, EXTENDED RELEASE ORAL DAILY
Status: DISCONTINUED | OUTPATIENT
Start: 2020-09-11 | End: 2020-09-11

## 2020-09-10 RX ORDER — TRAMADOL HYDROCHLORIDE 50 MG/1
50 TABLET ORAL
Status: DISCONTINUED | OUTPATIENT
Start: 2020-09-10 | End: 2020-09-12 | Stop reason: HOSPADM

## 2020-09-10 RX ORDER — SODIUM CHLORIDE 0.9 % (FLUSH) 0.9 %
5-40 SYRINGE (ML) INJECTION AS NEEDED
Status: DISCONTINUED | OUTPATIENT
Start: 2020-09-10 | End: 2020-09-12 | Stop reason: HOSPADM

## 2020-09-10 RX ORDER — POLYETHYLENE GLYCOL 3350 17 G/17G
17 POWDER, FOR SOLUTION ORAL DAILY PRN
Status: DISCONTINUED | OUTPATIENT
Start: 2020-09-10 | End: 2020-09-12 | Stop reason: HOSPADM

## 2020-09-10 RX ORDER — FUROSEMIDE 10 MG/ML
20 INJECTION INTRAMUSCULAR; INTRAVENOUS ONCE
Status: COMPLETED | OUTPATIENT
Start: 2020-09-10 | End: 2020-09-10

## 2020-09-10 RX ORDER — PANTOPRAZOLE SODIUM 40 MG/1
40 TABLET, DELAYED RELEASE ORAL DAILY
Status: DISCONTINUED | OUTPATIENT
Start: 2020-09-11 | End: 2020-09-12 | Stop reason: HOSPADM

## 2020-09-10 RX ORDER — NALOXONE HYDROCHLORIDE 0.4 MG/ML
0.4 INJECTION, SOLUTION INTRAMUSCULAR; INTRAVENOUS; SUBCUTANEOUS AS NEEDED
Status: DISCONTINUED | OUTPATIENT
Start: 2020-09-10 | End: 2020-09-12 | Stop reason: HOSPADM

## 2020-09-10 RX ORDER — SODIUM CHLORIDE 0.9 % (FLUSH) 0.9 %
5-40 SYRINGE (ML) INJECTION EVERY 8 HOURS
Status: DISCONTINUED | OUTPATIENT
Start: 2020-09-10 | End: 2020-09-12 | Stop reason: HOSPADM

## 2020-09-10 RX ORDER — PROMETHAZINE HYDROCHLORIDE 25 MG/1
12.5 TABLET ORAL
Status: DISCONTINUED | OUTPATIENT
Start: 2020-09-10 | End: 2020-09-12 | Stop reason: HOSPADM

## 2020-09-10 RX ORDER — FUROSEMIDE 10 MG/ML
20 INJECTION INTRAMUSCULAR; INTRAVENOUS 2 TIMES DAILY
Status: DISCONTINUED | OUTPATIENT
Start: 2020-09-11 | End: 2020-09-11

## 2020-09-10 RX ORDER — METOPROLOL TARTRATE 25 MG/1
25 TABLET, FILM COATED ORAL EVERY 12 HOURS
Status: DISCONTINUED | OUTPATIENT
Start: 2020-09-10 | End: 2020-09-11 | Stop reason: SDUPTHER

## 2020-09-10 RX ADMIN — ATORVASTATIN CALCIUM 40 MG: 20 TABLET, FILM COATED ORAL at 22:21

## 2020-09-10 RX ADMIN — Medication 10 ML: at 22:29

## 2020-09-10 RX ADMIN — FUROSEMIDE 20 MG: 10 INJECTION, SOLUTION INTRAMUSCULAR; INTRAVENOUS at 18:47

## 2020-09-10 RX ADMIN — METOPROLOL TARTRATE 25 MG: 25 TABLET, FILM COATED ORAL at 22:21

## 2020-09-10 NOTE — ED NOTES
Coughing x months. Sent to ED by [facility] for further evaluation. I performed a brief evaluation, including history and physical, of the patient here in triage and I have determined that pt will need further treatment and evaluation from the main side ER physician. I have placed initial orders to help in expediting patients care.      September 10, 2020 at 5:14 PM - [place], [place]

## 2020-09-10 NOTE — ED TRIAGE NOTES
Patient states she has been coughing for several months, productive cough. Was seen at [facility] today, had a covid test at [facility], told the patient to go to the ED for possible new onset Afib, bilateral pleural effusion , orthopnea , pedal edema.

## 2020-09-11 ENCOUNTER — APPOINTMENT (OUTPATIENT)
Dept: NON INVASIVE DIAGNOSTICS | Age: 85
DRG: 293 | End: 2020-09-11
Attending: HOSPITALIST
Payer: MEDICARE

## 2020-09-11 LAB
ANION GAP SERPL CALC-SCNC: 9 MMOL/L (ref 3–18)
BASOPHILS # BLD: 0 K/UL (ref 0–0.1)
BASOPHILS NFR BLD: 0 % (ref 0–2)
BUN SERPL-MCNC: 21 MG/DL (ref 7–18)
BUN/CREAT SERPL: 16 (ref 12–20)
CALCIUM SERPL-MCNC: 9.2 MG/DL (ref 8.5–10.1)
CALCULATED R AXIS, ECG10: 62 DEGREES
CALCULATED T AXIS, ECG11: 77 DEGREES
CHLORIDE SERPL-SCNC: 102 MMOL/L (ref 100–111)
CK MB CFR SERPL CALC: 1.5 % (ref 0–4)
CK MB CFR SERPL CALC: NORMAL % (ref 0–4)
CK MB SERPL-MCNC: 1 NG/ML (ref 5–25)
CK MB SERPL-MCNC: <1 NG/ML (ref 5–25)
CK SERPL-CCNC: 60 U/L (ref 26–192)
CK SERPL-CCNC: 65 U/L (ref 26–192)
CO2 SERPL-SCNC: 31 MMOL/L (ref 21–32)
CREAT SERPL-MCNC: 1.29 MG/DL (ref 0.6–1.3)
DIAGNOSIS, 93000: NORMAL
DIFFERENTIAL METHOD BLD: ABNORMAL
ECHO AO ASC DIAM: 2.91 CM
ECHO AO ROOT DIAM: 2.94 CM
ECHO IVC PROX: 1.43 CM
ECHO LA AREA 4C: 25.45 CM2
ECHO LA MAJOR AXIS: 3.58 CM
ECHO LA MINOR AXIS: 2.17 CM
ECHO LA VOL 2C: 62.92 ML (ref 22–52)
ECHO LA VOL 4C: 82.56 ML (ref 22–52)
ECHO LA VOL BP: 84.05 ML (ref 22–52)
ECHO LA VOL/BSA BIPLANE: 50.94 ML/M2 (ref 16–28)
ECHO LA VOLUME INDEX A2C: 38.14 ML/M2 (ref 16–28)
ECHO LA VOLUME INDEX A4C: 50.04 ML/M2 (ref 16–28)
ECHO LV EDV A2C: 64.3 ML
ECHO LV EDV A4C: 75.71 ML
ECHO LV EDV BP: 69.82 ML (ref 56–104)
ECHO LV EDV INDEX A4C: 45.9 ML/M2
ECHO LV EDV INDEX BP: 42.3 ML/M2
ECHO LV EDV NDEX A2C: 39 ML/M2
ECHO LV EJECTION FRACTION A2C: 44 PERCENT
ECHO LV EJECTION FRACTION A4C: 47 PERCENT
ECHO LV EJECTION FRACTION BIPLANE: 45.4 PERCENT (ref 55–100)
ECHO LV ESV A2C: 36.07 ML
ECHO LV ESV A4C: 40.45 ML
ECHO LV ESV BP: 38.11 ML (ref 19–49)
ECHO LV ESV INDEX A2C: 21.9 ML/M2
ECHO LV ESV INDEX A4C: 24.5 ML/M2
ECHO LV ESV INDEX BP: 23.1 ML/M2
ECHO LV INTERNAL DIMENSION DIASTOLIC: 3.83 CM (ref 3.9–5.3)
ECHO LV INTERNAL DIMENSION SYSTOLIC: 2.95 CM
ECHO LV IVSD: 1.1 CM (ref 0.6–0.9)
ECHO LV MASS 2D: 112 G (ref 67–162)
ECHO LV MASS INDEX 2D: 67.9 G/M2 (ref 43–95)
ECHO LV POSTERIOR WALL DIASTOLIC: 0.82 CM (ref 0.6–0.9)
ECHO LVOT DIAM: 1.82 CM
ECHO LVOT PEAK GRADIENT: 4.39 MMHG
ECHO LVOT SV: 45.8 ML
ECHO LVOT VTI: 17.6 CM
ECHO RV TAPSE: 1.59 CM (ref 1.5–2)
ECHO TV REGURGITANT MAX VELOCITY: 271 CM/S
ECHO TV REGURGITANT PEAK GRADIENT: 29.5 MMHG
EOSINOPHIL # BLD: 0.1 K/UL (ref 0–0.4)
EOSINOPHIL NFR BLD: 1 % (ref 0–5)
ERYTHROCYTE [DISTWIDTH] IN BLOOD BY AUTOMATED COUNT: 13 % (ref 11.6–14.5)
GLUCOSE SERPL-MCNC: 98 MG/DL (ref 74–99)
HCT VFR BLD AUTO: 34.9 % (ref 35–45)
HGB BLD-MCNC: 11.6 G/DL (ref 12–16)
LVOT MG: 2.36 MMHG
LYMPHOCYTES # BLD: 0.9 K/UL (ref 0.9–3.6)
LYMPHOCYTES NFR BLD: 19 % (ref 21–52)
MCH RBC QN AUTO: 30.9 PG (ref 24–34)
MCHC RBC AUTO-ENTMCNC: 33.2 G/DL (ref 31–37)
MCV RBC AUTO: 92.8 FL (ref 74–97)
MONOCYTES # BLD: 0.4 K/UL (ref 0.05–1.2)
MONOCYTES NFR BLD: 8 % (ref 3–10)
NEUTS SEG # BLD: 3.5 K/UL (ref 1.8–8)
NEUTS SEG NFR BLD: 72 % (ref 40–73)
PLATELET # BLD AUTO: 257 K/UL (ref 135–420)
PMV BLD AUTO: 10.3 FL (ref 9.2–11.8)
POTASSIUM SERPL-SCNC: 3.4 MMOL/L (ref 3.5–5.5)
Q-T INTERVAL, ECG07: 412 MS
QRS DURATION, ECG06: 84 MS
QTC CALCULATION (BEZET), ECG08: 421 MS
RBC # BLD AUTO: 3.76 M/UL (ref 4.2–5.3)
SODIUM SERPL-SCNC: 142 MMOL/L (ref 136–145)
TROPONIN I SERPL-MCNC: <0.02 NG/ML (ref 0–0.04)
TROPONIN I SERPL-MCNC: <0.02 NG/ML (ref 0–0.04)
VENTRICULAR RATE, ECG03: 63 BPM
WBC # BLD AUTO: 4.9 K/UL (ref 4.6–13.2)

## 2020-09-11 PROCEDURE — 85025 COMPLETE CBC W/AUTO DIFF WBC: CPT

## 2020-09-11 PROCEDURE — 74011250637 HC RX REV CODE- 250/637: Performed by: INTERNAL MEDICINE

## 2020-09-11 PROCEDURE — 82550 ASSAY OF CK (CPK): CPT

## 2020-09-11 PROCEDURE — 65660000000 HC RM CCU STEPDOWN

## 2020-09-11 PROCEDURE — 74011250637 HC RX REV CODE- 250/637: Performed by: HOSPITALIST

## 2020-09-11 PROCEDURE — 36415 COLL VENOUS BLD VENIPUNCTURE: CPT

## 2020-09-11 PROCEDURE — 93306 TTE W/DOPPLER COMPLETE: CPT

## 2020-09-11 PROCEDURE — 74011250636 HC RX REV CODE- 250/636: Performed by: HOSPITALIST

## 2020-09-11 PROCEDURE — 74011000250 HC RX REV CODE- 250: Performed by: HOSPITALIST

## 2020-09-11 PROCEDURE — 80048 BASIC METABOLIC PNL TOTAL CA: CPT

## 2020-09-11 RX ORDER — BUMETANIDE 0.25 MG/ML
1 INJECTION INTRAMUSCULAR; INTRAVENOUS DAILY
Status: DISCONTINUED | OUTPATIENT
Start: 2020-09-12 | End: 2020-09-12

## 2020-09-11 RX ORDER — CARVEDILOL 3.12 MG/1
6.25 TABLET ORAL 2 TIMES DAILY WITH MEALS
Status: DISCONTINUED | OUTPATIENT
Start: 2020-09-11 | End: 2020-09-11

## 2020-09-11 RX ORDER — POTASSIUM CHLORIDE 20 MEQ/1
40 TABLET, EXTENDED RELEASE ORAL
Status: COMPLETED | OUTPATIENT
Start: 2020-09-11 | End: 2020-09-11

## 2020-09-11 RX ORDER — CARVEDILOL 25 MG/1
25 TABLET ORAL 2 TIMES DAILY WITH MEALS
Status: DISCONTINUED | OUTPATIENT
Start: 2020-09-11 | End: 2020-09-12 | Stop reason: HOSPADM

## 2020-09-11 RX ORDER — METOPROLOL TARTRATE 50 MG/1
50 TABLET ORAL 2 TIMES DAILY
Status: DISCONTINUED | OUTPATIENT
Start: 2020-09-11 | End: 2020-09-11

## 2020-09-11 RX ORDER — METOPROLOL TARTRATE 25 MG/1
25 TABLET, FILM COATED ORAL ONCE
Status: COMPLETED | OUTPATIENT
Start: 2020-09-11 | End: 2020-09-11

## 2020-09-11 RX ORDER — BUMETANIDE 0.25 MG/ML
1 INJECTION INTRAMUSCULAR; INTRAVENOUS 2 TIMES DAILY
Status: DISCONTINUED | OUTPATIENT
Start: 2020-09-11 | End: 2020-09-11

## 2020-09-11 RX ORDER — LISINOPRIL 5 MG/1
5 TABLET ORAL DAILY
Status: DISCONTINUED | OUTPATIENT
Start: 2020-09-11 | End: 2020-09-12 | Stop reason: HOSPADM

## 2020-09-11 RX ADMIN — Medication 10 ML: at 14:00

## 2020-09-11 RX ADMIN — LISINOPRIL 5 MG: 5 TABLET ORAL at 12:11

## 2020-09-11 RX ADMIN — POTASSIUM CHLORIDE 40 MEQ: 20 TABLET, EXTENDED RELEASE ORAL at 09:00

## 2020-09-11 RX ADMIN — APIXABAN 2.5 MG: 2.5 TABLET, FILM COATED ORAL at 17:04

## 2020-09-11 RX ADMIN — APIXABAN 2.5 MG: 2.5 TABLET, FILM COATED ORAL at 09:33

## 2020-09-11 RX ADMIN — BUMETANIDE 1 MG: 0.25 INJECTION INTRAMUSCULAR; INTRAVENOUS at 09:33

## 2020-09-11 RX ADMIN — METOPROLOL TARTRATE 25 MG: 25 TABLET, FILM COATED ORAL at 04:36

## 2020-09-11 RX ADMIN — DILTIAZEM HYDROCHLORIDE 240 MG: 120 CAPSULE, COATED, EXTENDED RELEASE ORAL at 09:33

## 2020-09-11 RX ADMIN — CARVEDILOL 6.25 MG: 3.12 TABLET, FILM COATED ORAL at 07:53

## 2020-09-11 RX ADMIN — Medication 10 ML: at 06:04

## 2020-09-11 RX ADMIN — CARVEDILOL 25 MG: 25 TABLET, FILM COATED ORAL at 17:04

## 2020-09-11 RX ADMIN — PANTOPRAZOLE SODIUM 40 MG: 40 TABLET, DELAYED RELEASE ORAL at 09:33

## 2020-09-11 RX ADMIN — ATORVASTATIN CALCIUM 40 MG: 20 TABLET, FILM COATED ORAL at 21:12

## 2020-09-11 RX ADMIN — Medication 10 ML: at 21:12

## 2020-09-11 RX ADMIN — FUROSEMIDE 20 MG: 10 INJECTION, SOLUTION INTRAMUSCULAR; INTRAVENOUS at 00:20

## 2020-09-11 NOTE — ROUTINE PROCESS
Bedside shift report received from [address] [place] [address] with sbar Up to bs with help Dr. Noah Dowell in to see patient Spoke with Kimberly Rioser at [facility] [phone]. If Patient is discharge call [phone] to arrange for ride home 
oob to bsc with help, patient had bm Bedside shift report given to [address] [place] [address] with sbar

## 2020-09-11 NOTE — ROUTINE PROCESS
2127 phone report received from [facility]. 2137 TRANSFER - IN REPORT: 
 
Verbal report received from [facility] (name) on [address]  being received from ED(unit) for routine progression of care Report consisted of patients Situation, Background, Assessment and  
Recommendations(SBAR). Information from the following report(s) SBAR, Kardex, ED Summary, Intake/Output and MAR was reviewed with the receiving nurse. Opportunity for questions and clarification was provided. Assessment completed upon patients arrival to unit and care assumed. Pt arrived to unit. Transferred from wheelchair to bed with pivot. Pt is alert, no distress noted. No complaints of pain or discomfort. Call light is within reach, bedside table in reach. Bed in low position with wheels locked. Wheel chair in room is PERSONAL wheelchair from home. Pt states she uses a walker at home for mobility. Able to walk with walker without any other assistance, but needs supervision. Pt complains of right leg pain and weakness when walking, she stated it's an ongoing problem attributed to aging. Pt notes to have productive cough. No complaints of dizziness when ambulating. Redness noted over right hip. Puncture from biopsy on lefts calf- pt reported biopsy was last week on wednesday (9/2) mepilex placed on rip and calf. Pt placed on remote telemetry- pt noted to be in A Fib.  
 
0016 assisted pt to bathroom to void with a walker 
0121 assisted pt to bathroom to void with a walker. 0221 assisted pt to bathroom utilizing a walker. Pt voided and had a BM. Upon returning to the bed pt stated she \"is pooped. \" expressing she is tired of transferring to bathroom to void. 2090 Pts son Perlita Rowley called asking about the status of pt. Spoke with son updating of pts diagnosis and wellness. He will call tomorrow to speak with nurse, and pt. 
 
0351 pts vs taken, /92. MD notified and awaiting orders. Pt assisted to bathroom to void with walker. 0436 metoprolol given per MD order. 0536 BP rechecked, 197/100. MD notified. awaiting orders. 5010 assisted pt to bedside commode to void and have BP 
 
0652 Orders received for BP. 9250 Bedside and Verbal shift change report given to Dena Wong (oncoming nurse) by Juliano Harding RN (offgoing nurse). Report included the following information SBAR, Kardex, Intake/Output and MAR.

## 2020-09-11 NOTE — PROGRESS NOTES
conducted an initial consultation and Spiritual Assessment for [place], who is a 80 y.o.,female. Patients Primary Language is: [place]. According to the patients EMR Church Affiliation is: [facility].     The reason the Patient came to the hospital is:   Patient Active Problem List    Diagnosis Date Noted    Atrial fibrillation ([facility] 09/10/2020    CHF (congestive heart failure) ([facility] 09/10/2020    HTN (hypertension) 09/21/2018    CAD (coronary artery disease) 09/21/2018    Hypercholesteremia 09/21/2018    GERD (gastroesophageal reflux disease) 09/21/2018    Stomatitis and mucositis 09/21/2018    New onset atrial fibrillation ([facility] 09/20/2018    Renal cyst, left 05/15/2018    Bilateral nephrolithiasis 04/12/2018        The  provided the following Interventions:  Initiated a relationship of care and support with patient in room 2402 today. Listened empathically as she talked about her being here and what is going on now. Patient was slightly confused it seems as she talked about certain items including her advance directive. Patient says that she thinks she has one but does not know where it is or what it says. She also said in the next breath that she has all of her affairs in order and that the home will get everything that's left. Provided information about Spiritual Care Services. Offered prayer on patients behalf. Chart reviewed. The following outcomes were achieved:  Patient shared limited information about both their medical narrative and spiritual journey/beliefs. Patient processed feeling about current hospitalization. Patient expressed gratitude for pastoral care visit. Assessment:  Patient does not have any Yazdanism/cultural needs that will affect patients preferences in health care. There are no further spiritual or Yazdanism issues which require Spiritual Care Services interventions at this time.        Plan:  Chaplains will continue to follow and will provide pastoral care on an as needed/requested basis    . Hermila Rudolph   Spiritual Care   [phone]

## 2020-09-11 NOTE — CONSULTS
[facility] - Consult Note    Consultation request by Bessy Dominique MD for advice/opinion related to evaluating CHF (congestive heart failure) ([facility] [I50.9]; Atrial fibrillation ([facility] [I48.91]    Date of  Admission: 9/10/2020  5:32 PM   Primary Care Physician:  Colin Banuelos MD     Assessment:     - Initial concern with congestive heart failure: Elevated BNP, coughing especially  when patient lays down. Chest x-ray with possible small bilateral pleural effusion.  A-fib ([facility] Currently rate controlled. On Eliquis 2.5 mg twice daily as  outpatient   Coronary artery disease, status post cardiac cath in 2015    Dyslipidemia    Hypertension    STEMI involving right coronary artery ([facility]    Balloon angioplasty without coronary stent   Kidney stone      Plan:     -Continue IV Bumex with transition to oral likely tomorrow. No edema or significant rales on exam.  Clinically improving.  -Echo ordered today  -Patient on long-acting Cardizem and also Coreg. Heart rate stable. We will try to avoid to high doses of AV eran blocking agent in a 80year-old patient who likely can have underlying sick sinus syndrome.  -We will increase Coreg to 25 mg daily with holding parameters and discontinue Cardizem  -Depending on echo finding, would consider using ACE inhibitor or ARB     History of Present Illness: This is a 80 y.o. female admitted for CHF (congestive heart failure) ([facility] [I50.9]; Atrial fibrillation ([facility] [I48.91]. Patient complains of:    72-year-old female with known CAD, A. fib, hypertension  Came to hospital with some shortness of breath and also coughing spells especially when she lays down. She denies any chest pain. She denies any nausea vomiting diaphoresis. Still lives at home with help from sitter. She denies any recent falls. She uses cane to walk around. She denies lower extremity swelling.     Review of Symptoms:  Except as stated above include:  Constitutional: negative  Respiratory:  negative  Cardiovascular:  negative  Gastrointestinal: negative  Genitourinary:  negative  Musculoskeletal:  Negative  Neurological:  Negative  Dermatological:  Negative  Endocrinological: Negative  Psychological:  Negative    A comprehensive review of systems was negative except for that written in the HPI. Past Medical History:     Past Medical History:   Diagnosis Date    Coronary artery disease     Degenerative spine disease     Dyslipidemia     Hypertension     Kidney stone     Osteoarthritis     STEMI involving right coronary artery (HCC)     Balloon angioplasty without coronary stent         Social History:     Social History     Socioeconomic History    Marital status:      Spouse name: Not on file    Number of children: Not on file    Years of education: Not on file    Highest education level: Not on file   Tobacco Use    Smoking status: Never Smoker    Smokeless tobacco: Never Used   Substance and Sexual Activity    Alcohol use: No    Drug use: No    Sexual activity: Never        Family History:     Family History   Family history unknown: Yes        Medications:      Allergies   Allergen Reactions    Demerol [Meperidine] Other (comments)     Oral pain     Erythromycin Other (comments)    Nitrofurantoin Other (comments)     Mouth pain     Pcn [Penicillins] Other (comments)     Oral pain     Sulfa (Sulfonamide Antibiotics) Other (comments)     Oral pain         Current Facility-Administered Medications   Medication Dose Route Frequency    carvediloL (COREG) tablet 6.25 mg  6.25 mg Oral BID WITH MEALS    bumetanide (BUMEX) injection 1 mg  1 mg IntraVENous BID    potassium chloride (K-DUR, KLOR-CON) SR tablet 40 mEq  40 mEq Oral NOW    apixaban (ELIQUIS) tablet 2.5 mg  2.5 mg Oral BID    atorvastatin (LIPITOR) tablet 40 mg  40 mg Oral QHS    dilTIAZem ER (CARDIZEM CD) capsule 240 mg  240 mg Oral DAILY    pantoprazole (PROTONIX) tablet 40 mg  40 mg Oral DAILY    traMADoL (ULTRAM) tablet 50 mg  50 mg Oral Q6H PRN    sodium chloride (NS) flush 5-40 mL  5-40 mL IntraVENous Q8H    sodium chloride (NS) flush 5-40 mL  5-40 mL IntraVENous PRN    acetaminophen (TYLENOL) tablet 650 mg  650 mg Oral Q6H PRN    Or    acetaminophen (TYLENOL) suppository 650 mg  650 mg Rectal Q6H PRN    polyethylene glycol (MIRALAX) packet 17 g  17 g Oral DAILY PRN    promethazine (PHENERGAN) tablet 12.5 mg  12.5 mg Oral Q6H PRN    Or    ondansetron (ZOFRAN) injection 4 mg  4 mg IntraVENous Q6H PRN    naloxone (NARCAN) injection 0.4 mg  0.4 mg IntraVENous PRN         Physical Exam:     Visit Vitals  /83 (BP 1 Location: Left arm, BP Patient Position: At rest)   Pulse 75   Temp 97.4 °F (36.3 °C)   Resp 18   Ht 5' 4\" (1.626 m)   Wt 134 lb (60.8 kg)   SpO2 95%   BMI 23.00 kg/m²     BP Readings from Last 3 Encounters:   09/11/20 172/83   05/07/20 165/86   07/26/19 177/62     Pulse Readings from Last 3 Encounters:   09/11/20 75   05/07/20 78   07/26/19 73     Wt Readings from Last 3 Encounters:   09/10/20 134 lb (60.8 kg)   07/26/19 124 lb (56.2 kg)   05/26/19 124 lb (56.2 kg)       General:  alert, cooperative, no distress, appears stated age  Neck:  no carotid bruit, no JVD  Lungs:  clear to auscultation bilaterally  Heart:  regular rate and rhythm, S1, S2 normal, no murmur, click, rub or gallop  Abdomen:  abdomen is soft without significant tenderness,   Extremities:  extremities normal, atraumatic, no cyanosis or edema  Skin: Warm and dry.  no hyperpigmentation, vitiligo, or suspicious lesions  Neuro: alert, oriented x3, affect appropriate,   Psych: non focal     Data Review:     Recent Labs     09/11/20  0400 09/10/20  1750   WBC 4.9 4.5*   HGB 11.6* 10.3*   HCT 34.9* 31.6*    252     Recent Labs     09/11/20  0400 09/10/20  1750    145   K 3.4* 3.8    98*   CO2 31 28   GLU 98 105*   BUN 21* 23*   CREA 1.29 1.34*   CA 9.2 8.6       Results for orders placed or performed during the hospital encounter of 09/10/20   EKG, 12 LEAD, INITIAL   Result Value Ref Range    Ventricular Rate 63 BPM    QRS Duration 84 ms    Q-T Interval 412 ms    QTC Calculation (Bezet) 421 ms    Calculated R Axis 62 degrees    Calculated T Axis 77 degrees    Diagnosis       Atrial fibrillation  Nonspecific ST abnormality  Abnormal ECG  When compared with ECG of 07-MAY-2020 13:27,  Previous ECG has undetermined rhythm, needs review  Nonspecific T wave abnormality now evident in Anterolateral leads  Confirmed by Merlin Sifuentes ([id number]) on 9/11/2020 8:58:35 AM         All Cardiac Markers in the last 24 hours:    Lab Results   Component Value Date/Time    CPK 60 09/11/2020 04:00 AM    CKMB <1.0 09/11/2020 04:00 AM    CKND1  09/11/2020 04:00 AM     CALCULATION NOT PERFORMED WHEN RESULT IS BELOW LINEAR LIMIT    TROIQ <0.02 09/11/2020 04:00 AM    TROIQ <0.02 09/10/2020 05:50 PM       Last Lipid:    Lab Results   Component Value Date/Time    Cholesterol, total 161 05/05/2015 02:50 AM    HDL Cholesterol 52 05/05/2015 02:50 AM    LDL, calculated 86 05/05/2015 02:50 AM    Triglyceride 115 05/05/2015 02:50 AM    CHOL/HDL Ratio 3.1 05/05/2015 02:50 AM       Signed By: Author Scott MD     September 11, 2020

## 2020-09-11 NOTE — PROGRESS NOTES
Problem: Discharge Planning  Goal: *Discharge to safe environment  Outcome: Resolved/Met   Plan home    Reason for Admission:   chf                   RUR Score:      14%               Plan for utilizing home health:  no        PCP: First and Last name:  chau bustillos   Name of Practice:    Are you a current patient: Yes/No: yes   Approximate date of last visit: 3mo ago   Can you participate in a virtual visit with your PCP: no                    Current Advanced Directive/Advance Care Plan: no does not want one                         Transition of Care Plan:    Spoke with pt,lives alone. Independent with adls. amb independently sometime uses a cane. DME: cane walker w/c  Has a son in [facility]. No other family  Has caregivers through "[employer]. Has one 9a-2p and then 4p-7p  They assist her with meals, transport, housekeeping laundry. She states a caregiver will transport home. Called "[employer] spoke with Lizbeth Key, nurse there. She states caregiver can transport pt home. When pt dc'd call [phone], on call nurse will answer and have transport arranged. Pt will need covid test neg. Sent message for dr Julius Colunga to order      Patient has designated ____son____________________ to participate in his/her discharge plan and to receive any needed information. Name: Sondra Bobby  Address:[place]  Phone number: [phone]    Care Management Interventions  PCP Verified by CM: Yes  Palliative Care Criteria Met (RRAT>21 & CHF Dx)?: No  Mode of Transport at Discharge: Other (see comment)  Transition of Care Consult (CM Consult):  Other  Discharge Durable Medical Equipment: No  Physical Therapy Consult: No  Occupational Therapy Consult: No  Speech Therapy Consult: No  Current Support Network: Lives Alone  Confirm Follow Up Transport: Other (see comment)  The Patient and/or Patient Representative was Provided with a Choice of Provider and Agrees with the Discharge Plan?: No  Freedom of Choice List was Provided with Basic Dialogue that Supports the Patient's Individualized Plan of Care/Goals, Treatment Preferences and Shares the Quality Data Associated with the Providers?: No  Discharge Location  Discharge Placement: Home

## 2020-09-11 NOTE — PROGRESS NOTES
Problem: Pain  Goal: *Control of Pain  Outcome: Progressing Towards Goal     Problem: Falls - Risk of  Goal: *Absence of Falls  Description: Document Ilene Fall Risk and appropriate interventions in the flowsheet.   Outcome: Progressing Towards Goal  Note: Fall Risk Interventions:  Mobility Interventions: Bed/chair exit alarm, Patient to call before getting OOB, Utilize walker, cane, or other assistive device         Medication Interventions: Bed/chair exit alarm, Patient to call before getting OOB, Teach patient to arise slowly    Elimination Interventions: Bed/chair exit alarm, Call light in reach, Patient to call for help with toileting needs

## 2020-09-11 NOTE — PROGRESS NOTES
Medicine Progress Note    Patient: Corazon Gomes   Age:  80 y.o.  DOA: 9/10/2020   Admit Dx / CC: CHF (congestive heart failure) ([facility] [I50.9]  Atrial fibrillation ([facility] [I48.91]  LOS:  LOS: 1 day     Assessment/Plan   Principal Problem:    CHF (congestive heart failure) ([address] (9/10/2020)    Active Problems:    HTN (hypertension) (9/21/2018)      CAD (coronary artery disease) (9/21/2018)      Atrial fibrillation ([address] (9/10/2020)        Additional Plan notes     Possible CHF   - IV bumex   - Echo pending   - cards following   - increase BB per cardiology    Long hx of CAD   - spoke to Dr. Lizzie Delacruz , she has bad disease and is at high risk for an event. DISPO        Anticipated Date of Discharge: tomorrow  Anticipated Disposition (home, SNF) : home    Subjective:   Patient seen and examined. No chest pain, feeling well    Objective:     Visit Vitals  /83 (BP 1 Location: Left arm, BP Patient Position: At rest)   Pulse 75   Temp 97.4 °F (36.3 °C)   Resp 18   Ht 5' 4\" (1.626 m)   Wt 60.8 kg (134 lb)   SpO2 95%   BMI 23.00 kg/m²       Physical Exam:  General appearance: alert, cooperative, no distress, appears stated age  Head: Normocephalic, without obvious abnormality, atraumatic  Neck: supple, trachea midline  Lungs: clear to auscultation bilaterally  Heart: regular rate and rhythm, S1, S2 normal, no murmur, click, rub or gallop  Abdomen: soft, non-tender.  Bowel sounds normal. No masses,  no organomegaly  Extremities: extremities normal, atraumatic, no cyanosis or edema  Skin: Skin color, texture, turgor normal. No rashes or lesions  Neurologic: Grossly normal    Intake and Output:  Current Shift:  09/11 0701 - 09/11 1900  In: 600 [P.O.:600]  Out: 100 [Urine:100]  Last three shifts:  09/09 1901 - 09/11 0700  In: -   Out: 1200 [Urine:1200]    Lab/Data Reviewed:  CMP:   Lab Results   Component Value Date/Time     09/11/2020 04:00 AM    K 3.4 (L) 09/11/2020 04:00 AM     09/11/2020 04:00 AM    CO2 31 09/11/2020 04:00 AM    AGAP 9 09/11/2020 04:00 AM    GLU 98 09/11/2020 04:00 AM    BUN 21 (H) 09/11/2020 04:00 AM    CREA 1.29 09/11/2020 04:00 AM    GFRAA 46 (L) 09/11/2020 04:00 AM    GFRNA 38 (L) 09/11/2020 04:00 AM    CA 9.2 09/11/2020 04:00 AM     CBC:   Lab Results   Component Value Date/Time    WBC 4.9 09/11/2020 04:00 AM    HGB 11.6 (L) 09/11/2020 04:00 AM    HCT 34.9 (L) 09/11/2020 04:00 AM     09/11/2020 04:00 AM     All Cardiac Markers in the last 24 hours:   Lab Results   Component Value Date/Time    CPK 60 09/11/2020 04:00 AM    CKMB <1.0 09/11/2020 04:00 AM    CKND1  09/11/2020 04:00 AM     CALCULATION NOT PERFORMED WHEN RESULT IS BELOW LINEAR LIMIT    TROIQ <0.02 09/11/2020 04:00 AM    TROIQ <0.02 09/10/2020 05:50 PM       Medications Reviewed:  Current Facility-Administered Medications   Medication Dose Route Frequency    potassium chloride (K-DUR, KLOR-CON) SR tablet 40 mEq  40 mEq Oral NOW    carvediloL (COREG) tablet 25 mg  25 mg Oral BID WITH MEALS    lisinopriL (PRINIVIL, ZESTRIL) tablet 5 mg  5 mg Oral DAILY    [START ON 9/12/2020] bumetanide (BUMEX) injection 1 mg  1 mg IntraVENous DAILY    apixaban (ELIQUIS) tablet 2.5 mg  2.5 mg Oral BID    atorvastatin (LIPITOR) tablet 40 mg  40 mg Oral QHS    pantoprazole (PROTONIX) tablet 40 mg  40 mg Oral DAILY    traMADoL (ULTRAM) tablet 50 mg  50 mg Oral Q6H PRN    sodium chloride (NS) flush 5-40 mL  5-40 mL IntraVENous Q8H    sodium chloride (NS) flush 5-40 mL  5-40 mL IntraVENous PRN    acetaminophen (TYLENOL) tablet 650 mg  650 mg Oral Q6H PRN    Or    acetaminophen (TYLENOL) suppository 650 mg  650 mg Rectal Q6H PRN    polyethylene glycol (MIRALAX) packet 17 g  17 g Oral DAILY PRN    promethazine (PHENERGAN) tablet 12.5 mg  12.5 mg Oral Q6H PRN    Or    ondansetron (ZOFRAN) injection 4 mg  4 mg IntraVENous Q6H PRN    naloxone (NARCAN) injection 0.4 mg  0.4 mg IntraVENous PRN       Moose Arriaga MD    September 11, 2020

## 2020-09-11 NOTE — H&P
History and Physical    Patient: Daniela Jiménez               Sex: female          DOA: 9/10/2020       YOB: 1923      Age:  80 y.o.        LOS:  LOS: 0 days        HPI:     Daniela Jiménez is a 80 y.o. female who presented to the ER with complaints of dry cough. She does not have chest pain. She says that she has trouble with breathing when she lays flat. She does not have fever. She has a known history of CAD and A fib. She has difficulty with feet swelling. It was trouble catching her breath that caused her to seek care. In the ER she appears to have CHF and will be admitted for ongoing management. Past Medical History:   Diagnosis Date    Coronary artery disease     Degenerative joint disease     Degenerative spine disease     Dyslipidemia     Hypertension     Hypertension     Kidney stone     Osteoarthritis        Social History:   Tobacco use:  Patient does not smoke   Alcohol use:  Patient does not use alcohol   Occupation:  Patient lives alone. She has been a  for over 20 years    Family History: Mother had CVA   Father had lung cancer    Review of Systems    Constitutional:  No fever or weight loss  HEENT:  No headache or visual changes  Cardiovascular:  No chest pain or diaphoresis  Respiratory:  Dry cough with shortness of breath. GI:  No nausea or vomitting. No diarrhea  :  No hematuria or dysuria  Skin:  No rashes or moles  Neuro:  No seizures or syncope  Extremities:  Edema of lower extremities bilaterally. No cyanosis  Hematological:  No bruising or bleeding  Endocrine:  No diabetes or thyroid disease    Physical Exam:      Visit Vitals  /62 (BP 1 Location: Right arm, BP Patient Position: At rest)   Pulse 69   Temp 98 °F (36.7 °C)   Resp 20   Ht 5' 4\" (1.626 m)   Wt 60.8 kg (134 lb)   SpO2 93%   BMI 23.00 kg/m²       Physical Exam:    Gen:  No distress, alert  HEENT:  Normal cephalic atraumatic, extra-occular movements are intact.   Neck:  Supple, No JVD  Lungs:  Clear bilaterally, no wheeze, no rales, normal effort  Heart:  Regular Rate and Rhythm, normal S1 and S2, no edema  Abdomen:  Soft, non tender, normal bowel sounds, no guarding. Extremities:  Well perfused, no cyanosis or edema  Neurological:  Awake and alert, CN's are intact, normal strength throughout extremities  Skin:  No rashes or moles  Mental Status:  Normal thought process, does not appear anxious    Laboratory Studies:    BMP:   Lab Results   Component Value Date/Time     09/10/2020 05:50 PM    K 3.8 09/10/2020 05:50 PM    CL 98 (L) 09/10/2020 05:50 PM    CO2 28 09/10/2020 05:50 PM    AGAP 19 (H) 09/10/2020 05:50 PM     (H) 09/10/2020 05:50 PM    BUN 23 (H) 09/10/2020 05:50 PM    CREA 1.34 (H) 09/10/2020 05:50 PM    GFRAA 44 (L) 09/10/2020 05:50 PM    GFRNA 37 (L) 09/10/2020 05:50 PM     CBC:   Lab Results   Component Value Date/Time    WBC 4.5 (L) 09/10/2020 05:50 PM    HGB 10.3 (L) 09/10/2020 05:50 PM    HCT 31.6 (L) 09/10/2020 05:50 PM     09/10/2020 05:50 PM     All Cardiac Markers in the last 24 hours:   Lab Results   Component Value Date/Time    TROIQ <0.02 09/10/2020 05:50 PM       Assessment/Plan     Principal Problem:    CHF (congestive heart failure) ([facility] (9/10/2020)    Active Problems:    Atrial fibrillation ([facility] (9/10/2020)      HTN (hypertension) (9/21/2018)      CAD (coronary artery disease) (9/21/2018)        PLAN:    Serial cardiac enzymes  Echocardiogram  Gentle diuresis  Continue her outpatient anticoagulation  Cardiology consult.   They were called by PA in ER  BP control  Follow renal function  She does not appear to be in ACS  DVT prophylaxis

## 2020-09-11 NOTE — ED NOTES
Spoke to son Jeronimo Burr [phone] who would like his mother to call him when available. Patient does not have access to a land line to plug a phone in. Will allow patient to call her son when patient is fitted to another room.

## 2020-09-12 VITALS
OXYGEN SATURATION: 95 % | WEIGHT: 134 LBS | DIASTOLIC BLOOD PRESSURE: 75 MMHG | HEIGHT: 64 IN | SYSTOLIC BLOOD PRESSURE: 141 MMHG | BODY MASS INDEX: 22.88 KG/M2 | RESPIRATION RATE: 18 BRPM | TEMPERATURE: 97.8 F | HEART RATE: 86 BPM

## 2020-09-12 LAB
ANION GAP SERPL CALC-SCNC: 7 MMOL/L (ref 3–18)
BUN SERPL-MCNC: 36 MG/DL (ref 7–18)
BUN/CREAT SERPL: 23 (ref 12–20)
CALCIUM SERPL-MCNC: 8.3 MG/DL (ref 8.5–10.1)
CHLORIDE SERPL-SCNC: 104 MMOL/L (ref 100–111)
CO2 SERPL-SCNC: 29 MMOL/L (ref 21–32)
COVID-19 RAPID TEST, COVR: NOT DETECTED
CREAT SERPL-MCNC: 1.55 MG/DL (ref 0.6–1.3)
GLUCOSE SERPL-MCNC: 116 MG/DL (ref 74–99)
HEALTH STATUS, XMCV2T: NORMAL
POTASSIUM SERPL-SCNC: 4 MMOL/L (ref 3.5–5.5)
SODIUM SERPL-SCNC: 140 MMOL/L (ref 136–145)
SOURCE, COVRS: NORMAL
SPECIMEN TYPE, XMCV1T: NORMAL

## 2020-09-12 PROCEDURE — 80048 BASIC METABOLIC PNL TOTAL CA: CPT

## 2020-09-12 PROCEDURE — 74011250637 HC RX REV CODE- 250/637: Performed by: HOSPITALIST

## 2020-09-12 PROCEDURE — 36415 COLL VENOUS BLD VENIPUNCTURE: CPT

## 2020-09-12 PROCEDURE — 74011250637 HC RX REV CODE- 250/637: Performed by: INTERNAL MEDICINE

## 2020-09-12 PROCEDURE — 87635 SARS-COV-2 COVID-19 AMP PRB: CPT

## 2020-09-12 PROCEDURE — 2709999900 HC NON-CHARGEABLE SUPPLY

## 2020-09-12 PROCEDURE — 77030021352 HC CBL LD SYS DISP COVD -B

## 2020-09-12 RX ORDER — LISINOPRIL 5 MG/1
5 TABLET ORAL DAILY
Qty: 30 TAB | Refills: 0 | Status: SHIPPED | OUTPATIENT
Start: 2020-09-13

## 2020-09-12 RX ORDER — BUMETANIDE 1 MG/1
1 TABLET ORAL DAILY
Status: DISCONTINUED | OUTPATIENT
Start: 2020-09-12 | End: 2020-09-12 | Stop reason: HOSPADM

## 2020-09-12 RX ORDER — BUMETANIDE 1 MG/1
1 TABLET ORAL DAILY
Qty: 30 TAB | Refills: 0 | Status: SHIPPED | OUTPATIENT
Start: 2020-09-13

## 2020-09-12 RX ORDER — CARVEDILOL 25 MG/1
25 TABLET ORAL 2 TIMES DAILY WITH MEALS
Qty: 60 TAB | Refills: 0 | Status: SHIPPED | OUTPATIENT
Start: 2020-09-12

## 2020-09-12 RX ADMIN — Medication 10 ML: at 06:21

## 2020-09-12 RX ADMIN — BUMETANIDE 1 MG: 1 TABLET ORAL at 08:40

## 2020-09-12 RX ADMIN — LISINOPRIL 5 MG: 5 TABLET ORAL at 08:40

## 2020-09-12 RX ADMIN — PANTOPRAZOLE SODIUM 40 MG: 40 TABLET, DELAYED RELEASE ORAL at 08:40

## 2020-09-12 RX ADMIN — APIXABAN 2.5 MG: 2.5 TABLET, FILM COATED ORAL at 08:40

## 2020-09-12 RX ADMIN — CARVEDILOL 25 MG: 25 TABLET, FILM COATED ORAL at 08:40

## 2020-09-12 NOTE — PROGRESS NOTES
3410 Bedside and Verbal shift change report given to GODFREY LUIS  (oncoming nurse) by Ramos Cowart RN  (offgoing nurse). Report included the following information SBAR, Kardex, Intake/Output and MAR.      Received patient lying on bed, alert and oriented x4, no signs of respiratory distress, nausea and vomiting, denies pain at this time, fluid restriction 1200 ml / day per md order,  IV sites checked and patent, no signs of infiltration, bed in low position and locked,  call bell in reach, continue to monitor      0900 due meds given, tolerated well, up in bed In chair position, no sob, no cp, call bell in reach    1030 caregiver in the room, she didn't say that she is in the unit and is present this morning to pick patient up, the plan was that the primary nurse was to call the personal aide/caregiver to pick patient up in the hospital upon discharge, but caregiver came and did not even notify the staff in the hospital that she is going to be in the patients room      Rapid covid test sent to lab      1145 covid test negative, discharge instructions given, verbalized understanding    1210 discharged with personal aide, accompanied by cna

## 2020-09-12 NOTE — PROGRESS NOTES
Problem: Pain  Goal: *Control of Pain  Outcome: Progressing Towards Goal     Problem: Falls - Risk of  Goal: *Absence of Falls  Description: Document Ilene Fall Risk and appropriate interventions in the flowsheet.   Outcome: Progressing Towards Goal  Note: Fall Risk Interventions:  Mobility Interventions: Patient to call before getting OOB, Utilize walker, cane, or other assistive device         Medication Interventions: Patient to call before getting OOB, Teach patient to arise slowly    Elimination Interventions: Call light in reach, Patient to call for help with toileting needs, Toilet paper/wipes in reach

## 2020-09-12 NOTE — PROGRESS NOTES
[facility]  -  Progress Note      Patient: Jaspal Sanchez MRN: [id number]  SSN: [id number]    YOB: 1923  Age: 80 y.o. Sex: female      Admit Date: 9/10/2020    Assessment:     Hospital Problems  Date Reviewed: 9/10/2020          Codes Class Noted POA    Atrial fibrillation ([facility] ICD-10-CM: I48.91  ICD-9-CM: 427.31  9/10/2020 Unknown        * (Principal) CHF (congestive heart failure) ([facility] ICD-10-CM: I50.9  ICD-9-CM: 428.0  9/10/2020 Unknown        HTN (hypertension) ICD-10-CM: I10  ICD-9-CM: 401.9  9/21/2018 Yes        CAD (coronary artery disease) ICD-10-CM: I25.10  ICD-9-CM: 414.00  9/21/2018 Yes            -           Initial concern with congestive heart failure: Elevated BNP, coughing especially    when patient lays down. Chest x-ray with possible small bilateral pleural effusion.            A-fib ([facility] Currently rate controlled. On Eliquis 2.5 mg twice daily as    outpatient             Coronary artery disease, status post cardiac cath in 2015                 Dyslipidemia                 Hypertension                STEMI involving right coronary artery [facility])               Balloon angioplasty without coronary stent             Kidney stone       Plan:     Cardiac status remains stable. Reasonable to discharge patient for outpatient follow-up. Subjective:     No new complaints.      Objective:      Patient Vitals for the past 8 hrs:   Temp Pulse Resp BP SpO2   09/12/20 1056 97.8 °F (36.6 °C) 86 18 (!) 141/75 95 %   09/12/20 0755 98.4 °F (36.9 °C) 61 18 135/76 95 %   09/12/20 0735     97 %   09/12/20 0400  62      09/12/20 0344 97.8 °F (36.6 °C) 72 18 112/63 95 %         Patient Vitals for the past 96 hrs:   Weight   09/11/20 1426 60.8 kg (134 lb)   09/10/20 1722 60.8 kg (134 lb)         Intake/Output Summary (Last 24 hours) at 9/12/2020 1121  Last data filed at 9/12/2020 0931  Gross per 24 hour   Intake 1560 ml   Output 725 ml   Net 835 ml Physical Exam:  General:  alert, cooperative, no distress, appears stated age  Neck:  no JVD  Lungs:  clear to auscultation bilaterally  Heart:  regular rate and rhythm  Abdomen:  no guarding or rigidity  Extremities:  no edema    Data Review:     Labs: Results:       Chemistry Recent Labs     09/12/20  0430 09/11/20  0400 09/10/20  1750   * 98 105*    142 145   K 4.0 3.4* 3.8    102 98*   CO2 29 31 28   BUN 36* 21* 23*   CREA 1.55* 1.29 1.34*   CA 8.3* 9.2 8.6   AGAP 7 9 19*   BUCR 23* 16 17      CBC w/Diff Recent Labs     09/11/20  0400 09/10/20  1750   WBC 4.9 4.5*   RBC 3.76* 3.39*   HGB 11.6* 10.3*   HCT 34.9* 31.6*    252   GRANS 72 70   LYMPH 19* 22   EOS 1 1      Cardiac Enzymes Lab Results   Component Value Date/Time    CPK 65 09/11/2020 12:18 PM    CKMB 1.0 09/11/2020 12:18 PM    CKND1 1.5 09/11/2020 12:18 PM    TROIQ <0.02 09/11/2020 12:18 PM      Coagulation No results for input(s): PTP, INR, APTT, INREXT in the last 72 hours. Lipid Panel Lab Results   Component Value Date/Time    Cholesterol, total 161 05/05/2015 02:50 AM    HDL Cholesterol 52 05/05/2015 02:50 AM    LDL, calculated 86 05/05/2015 02:50 AM    VLDL, calculated 23 05/05/2015 02:50 AM    Triglyceride 115 05/05/2015 02:50 AM    CHOL/HDL Ratio 3.1 05/05/2015 02:50 AM      BNP No results found for: BNP, BNPP, XBNPT   Liver Enzymes No results for input(s): TP, ALB, TBIL, AP in the last 72 hours.     No lab exists for component: SGOT, GPT, DBIL   Digoxin    Thyroid Studies Lab Results   Component Value Date/Time    TSH 0.39 09/20/2018 11:12 AM

## 2020-09-12 NOTE — PROGRESS NOTES
Discharge/Transition Planning    Problem: Discharge Planning  Goal: *Discharge to safe environment  Outcome: Resolved/Met   Plan home    Notified PA Reprogle of COVID rapid being needed per policy of [facility] for caregiver to return. Care Management Interventions  PCP Verified by CM: Yes  Palliative Care Criteria Met (RRAT>21 & CHF Dx)?: No  Mode of Transport at Discharge: Other (see comment)  Transition of Care Consult (CM Consult):  Other  Discharge Durable Medical Equipment: No  Physical Therapy Consult: No  Occupational Therapy Consult: No  Speech Therapy Consult: No  Current Support Network: Lives Alone  Confirm Follow Up Transport: Other (see comment)  The Patient and/or Patient Representative was Provided with a Choice of Provider and Agrees with the Discharge Plan?: No  Freedom of Choice List was Provided with Basic Dialogue that Supports the Patient's Individualized Plan of Care/Goals, Treatment Preferences and Shares the Quality Data Associated with the Providers?: No  Discharge Location  Discharge Placement: Home  Savage Estevez RN BSN  Outcomes Manager  Office # [phone]  Pager # [phone]

## 2020-09-12 NOTE — DISCHARGE INSTRUCTIONS
DISCHARGE SUMMARY from Nurse    PATIENT INSTRUCTIONS:    After general anesthesia or intravenous sedation, for 24 hours or while taking prescription Narcotics:  · Limit your activities  · Do not drive and operate hazardous machinery  · Do not make important personal or business decisions  · Do  not drink alcoholic beverages  · If you have not urinated within 8 hours after discharge, please contact your surgeon on call. Report the following to your surgeon:  · Excessive pain, swelling, redness or odor of or around the surgical area  · Temperature over 100.5  · Nausea and vomiting lasting longer than 4 hours or if unable to take medications  · Any signs of decreased circulation or nerve impairment to extremity: change in color, persistent  numbness, tingling, coldness or increase pain  · Any questions    What to do at Home:  Recommended activity: Activity as tolerated,     If you experience any of the following symptoms listed below, please follow up with your PCP. *  Please give a list of your current medications to your Primary Care Provider. *  Please update this list whenever your medications are discontinued, doses are      changed, or new medications (including over-the-counter products) are added. *  Please carry medication information at all times in case of emergency situations. These are general instructions for a healthy lifestyle:    No smoking/ No tobacco products/ Avoid exposure to second hand smoke  Surgeon General's Warning:  Quitting smoking now greatly reduces serious risk to your health.     Obesity, smoking, and sedentary lifestyle greatly increases your risk for illness    A healthy diet, regular physical exercise & weight monitoring are important for maintaining a healthy lifestyle    You may be retaining fluid if you have a history of heart failure or if you experience any of the following symptoms:  Weight gain of 3 pounds or more overnight or 5 pounds in a week, increased swelling in our hands or feet or shortness of breath while lying flat in bed. Please call your doctor as soon as you notice any of these symptoms; do not wait until your next office visit. The discharge information has been reviewed with the patient. The patient verbalized understanding. Discharge medications reviewed with the patient and appropriate educational materials and side effects teaching were provided.         Patient armband removed and shredded    ___________________________________________________________________________________________________________________________________

## 2020-09-12 NOTE — PROGRESS NOTES
Problem: Pain  Goal: *Control of Pain  9/12/2020 1053 by Marlena Gamble  Outcome: Resolved/Met  9/12/2020 0805 by Marlena Gamble  Outcome: Progressing Towards Goal     Problem: Falls - Risk of  Goal: *Absence of Falls  Description: Document Elba Cabrera Fall Risk and appropriate interventions in the flowsheet.   9/12/2020 1053 by Marlena Gamble  Outcome: Resolved/Met  9/12/2020 0805 by Marlena Gamble  Outcome: Progressing Towards Goal  Note: Fall Risk Interventions:  Mobility Interventions: Patient to call before getting OOB         Medication Interventions: Patient to call before getting OOB    Elimination Interventions: Call light in reach              Problem: Fluid Volume - Risk of, Imbalanced  Goal: *Balanced intake and output  9/12/2020 1053 by Luisana Cheng  Outcome: Resolved/Met  9/12/2020 0805 by Marlena Gamble  Outcome: Progressing Towards Goal     Problem: Patient Education: Go to Patient Education Activity  Goal: Patient/Family Education  9/12/2020 1053 by Marlena Gamble  Outcome: Resolved/Met  9/12/2020 0805 by Marlena Gamble  Outcome: Progressing Towards Goal

## 2020-09-12 NOTE — PROGRESS NOTES
Problem: Pain  Goal: *Control of Pain  Outcome: Progressing Towards Goal     Problem: Falls - Risk of  Goal: *Absence of Falls  Description: Document Ilene Fall Risk and appropriate interventions in the flowsheet.   Outcome: Progressing Towards Goal  Note: Fall Risk Interventions:  Mobility Interventions: Patient to call before getting OOB         Medication Interventions: Patient to call before getting OOB    Elimination Interventions: Call light in reach              Problem: Fluid Volume - Risk of, Imbalanced  Goal: *Balanced intake and output  Outcome: Progressing Towards Goal     Problem: Patient Education: Go to Patient Education Activity  Goal: Patient/Family Education  Outcome: Progressing Towards Goal

## 2020-09-12 NOTE — DISCHARGE SUMMARY
[facility]  Hospitalist Division    Discharge Summary    Patient: Mike Alatorre MRN: [id number]  [facility]: [id number]    YOB: 1923  Age: 80 y.o. Sex: female    DOA: 9/10/2020 LOS:  LOS: 2 days   Discharge Date:      Admission Diagnoses: CHF (congestive heart failure) ([facility] [I50.9]  Atrial fibrillation ([facility] [I48.91]    Discharge Diagnoses:  Principal Problem:    CHF (congestive heart failure) ([address] (9/10/2020)    Active Problems:    HTN (hypertension) (9/21/2018)      CAD (coronary artery disease) (9/21/2018)      Atrial fibrillation ([facility] (9/10/2020)        Discharge Condition: Stable    Discharge To: Home    Consults: Cardiology    HPI: Mike Alatorre is a 80 y.o. female who presented to the ER with complaints of dry cough. She does not have chest pain. She says that she has trouble with breathing when she lays flat. She does not have fever. She has a known history of CAD and A fib. She has difficulty with feet swelling. It was trouble catching her breath that caused her to seek care. CXR with cardiomegaly and small b/l pleural effusions. BNP 5K. In the ER she appears to have CHF and will be admitted for ongoing management. Hospital Course: Cardiology was consulted. She was diuresed with IV bumex with good response and transitioned to oral. Home medications adjusted by cardiology. Echo with low normal systolic function. Unable to assess diastolic, full report below. Troponin remained negative. COVID test ordered per [employer] request. VS and labs stable for discharge home. Physical Exam:  General appearance: alert, cooperative, no distress, appears stated age  Head: Normocephalic, without obvious abnormality, atraumatic  Lungs: clear to auscultation bilaterally  Heart: regular rate and rhythm, S1, S2 normal, no murmur, click, rub or gallop  Abdomen: soft, non-tender.  Bowel sounds normal. No masses,  no organomegaly  Extremities: no cyanosis or edema  Skin: Skin color, texture normal. No rashes or lesions  Neurologic: no focal deficits, motor/sensory intact  PSY: Mood and affect normal, appropriately behaved    Significant Diagnostic Studies:     BMP:   Lab Results   Component Value Date/Time     09/12/2020 04:30 AM    K 4.0 09/12/2020 04:30 AM     09/12/2020 04:30 AM    CO2 29 09/12/2020 04:30 AM    AGAP 7 09/12/2020 04:30 AM     (H) 09/12/2020 04:30 AM    BUN 36 (H) 09/12/2020 04:30 AM    CREA 1.55 (H) 09/12/2020 04:30 AM    GFRAA 38 (L) 09/12/2020 04:30 AM    GFRNA 31 (L) 09/12/2020 04:30 AM        Echo 9/11/20  Interpretation Summary     Result status: Final result    · LV: Calculated LVEF is 50%. Visually measured ejection fraction. Normal cavity size and wall thickness. Low normal systolic function. Wall motion: normal.  · RV: Mildly reduced systolic function. · LA: Moderately dilated left atrium. Left Atrium volume index is 50.94 mL/m2. · RA: Dilated right atrium. · MV: Mitral valve non-specific thickening. Mild mitral valve regurgitation is present. · TV: Non-specific thickening of the tricuspid valve. Mild tricuspid valve regurgitation is present. · PA: Pulmonary arterial systolic pressure is 32 mmHg. · Pericardium: Trivial pericardial effusion adjacent to left ventricle. Pericardial fat pad present. Comparison Study Information     Prior Study     There is a prior study available for comparison. Prior study date: 9/20/2018. As compared to the previous study, there are no significant changes. Echo Findings     Left Ventricle  Normal cavity size and wall thickness. Wall motion: normal. The calculated EF is 50%. Visually measured ejection fraction. Low normal systolic function. Unable to assess diastolic function. Atrial fibrillation observed. Wall Scoring  The left ventricular wall motion is normal.             Left Atrium  Moderately dilated left atrium. Left Atrium volume index is 50.94 mL/m2.     Right Ventricle  Normal cavity size. Mildly reduced systolic function. Right Atrium  Dilated right atrium. Aortic Valve  Trileaflet valve structure, no stenosis and no regurgitation. Mild aortic valve sclerosis with no evidence of reduced excursion. Mitral Valve  No stenosis. Mitral valve non-specific thickening. Mild regurgitation. Tricuspid Valve  No stenosis. Non-specific thickening. Mild regurgitation. Pulmonic Valve  Pulmonic valve not well visualized. No stenosis. Trace regurgitation. Aorta  Normal aortic root and ascending aorta. Pulmonary Artery  Pulmonary arterial systolic pressure (PASP) is 32 mmHg. Pulmonary hypertension not suggested by Doppler findings. IVC/Hepatic Veins  Normal central venous pressure (0-5 mmHg); IVC diameter is less than 21 mm and collapses more than 50% with respiration. Pericardium  Trivial pericardial effusion noted. Effusion is located adjacent to the left ventricle. No indications of tamponade present. Pericardial fat pad present. Xr Chest Port    Result Date: 9/11/2020  EXAM: XR CHEST PORT CLINICAL INDICATION/HISTORY: dyspnea   > Additional: None. COMPARISON: None. TECHNIQUE: Portable chest _______________ FINDINGS: SUPPORT DEVICES: None. HEART AND MEDIASTINUM: The heart is enlarged. Atheromatous calcifications noted in the aortic arch. Normal pulmonary vasculature. LUNGS AND PLEURAL SPACES: Mild bibasilar volume loss with blunting of the costophrenic sulci. BONY THORAX AND SOFT TISSUES: There are chunky calcification overlying the right humeral head and possible in the left. Degenerative changes present in both shoulders. _______________     IMPRESSION: Cardiomegaly with small bilateral pleural effusions and atelectasis. Procedures: None    Discharge Medications:     Current Discharge Medication List      START taking these medications    Details   bumetanide (BUMEX) 1 mg tablet Take 1 Tab by mouth daily.   Qty: 30 Tab, Refills: 0 carvediloL (COREG) 25 mg tablet Take 1 Tab by mouth two (2) times daily (with meals). Qty: 60 Tab, Refills: 0      lisinopriL (PRINIVIL, ZESTRIL) 5 mg tablet Take 1 Tab by mouth daily. Qty: 30 Tab, Refills: 0         CONTINUE these medications which have NOT CHANGED    Details   apixaban (ELIQUIS) 2.5 mg tablet Take 2.5 mg by mouth two (2) times a day. !! ergocalciferol, vitamin D2, (DRISDOL PO) Take 1.25 mg by mouth Once every 2 weeks. polyethylene glycol (MIRALAX) 17 gram packet Take 1 Packet by mouth two (2) times a day. Qty: 30 Packet, Refills: 0      !! ergocalciferol (ERGOCALCIFEROL) 50,000 unit capsule Take 50,000 Units by mouth Once every 2 weeks. acetaminophen (TYLENOL EXTRA STRENGTH) 500 mg tablet Take 2 Tabs by mouth every six (6) hours as needed for Pain. Qty: 50 Tab, Refills: 0      atorvastatin (LIPITOR) 40 mg tablet Take 1 Tab by mouth nightly. Qty: 30 Tab, Refills: 0      pantoprazole (PROTONIX) 40 mg tablet Take 40 mg by mouth daily. Indications: GASTROESOPHAGEAL REFLUX      surcfmaox-Dtgcxjay-Tqsfe-W.Pet (PREPARATION H MAXIMUM STRENGTH) 0.25-1 % rectal cream Apply 1 Tube to affected area as needed for Hemorrhoids. Qty: 1 Tube, Refills: 0      nitroglycerin (NITROSTAT) 0.4 mg SL tablet 1 Tab by SubLINGual route every five (5) minutes as needed for Chest Pain. Qty: 30 Tab, Refills: 2       !! - Potential duplicate medications found. Please discuss with provider.       STOP taking these medications       dilTIAZem CD (CARDIZEM CD) 180 mg ER capsule Comments:   Reason for Stopping:         metoprolol tartrate (LOPRESSOR) 25 mg tablet Comments:   Reason for Stopping:         traMADol (ULTRAM) 50 mg tablet Comments:   Reason for Stopping:               Activity: Activity as tolerated    Diet: Cardiac Diet    Wound Care: None needed    Follow-up: 1 week with PCP, 3-4 weeks with cardiology    Discharge time: >35 minutes    Aamir Llanes PA-C  [address] [place] [address] Group  Hospitalist Division  Office:  [phone]  Pager: [phone]      9/12/2020, 10:13 AM

## 2020-09-14 ENCOUNTER — PATIENT OUTREACH (OUTPATIENT)
Dept: CASE MANAGEMENT | Age: 85
End: 2020-09-14

## 2020-09-14 NOTE — PROGRESS NOTES
Care Transitions Nurse ( CTN) attempted to contact patient via telephone call. There was no response. A voicemail message was left requesting a non-emergency return telephone call. CTN  contact information provided.

## 2020-09-15 ENCOUNTER — PATIENT OUTREACH (OUTPATIENT)
Dept: CASE MANAGEMENT | Age: 85
End: 2020-09-15

## 2020-09-15 NOTE — PROGRESS NOTES
Care Transitions Nurse ( CTN) attempted to contact patient via telephone call. There was no response and no option to leave a voicemail message x 3. Per review of chart patient is 80years old, lives alone with the assistance of caregivers from [facility].     Patient  unable to be reached via telephone call x 2 days. In regards to patient  Safety and follow up  CTN called [phone] spoke with [employer], how had been previously contacted by inpatient case management. Ms. Viola Tijerina related that patient is doing well and has seen her PCP. Ms. Viola Tijerina has reviewed patient's medications. Patient has a medical alert necklace that she wears and will use if needed per Ms. Lundberg.

## 2020-10-11 NOTE — PROGRESS NOTES
Physician Progress Note      Christiana Davila  CSN #:                  [id number]  :                       1923  ADMIT DATE:       9/10/2020 5:32 PM  [address] DATE:        2020 12:15 PM  RESPONDING  PROVIDER #:        Osvaldo AGRAWAL DO          QUERY TEXT:    Pt admitted with SOB. Pt noted to have possible CHF. WHEN ALL TESTING HAS BEEN COMPLETED, If possible, please document in progress notes and discharge summary if you are evaluating and/or treating any of the following: The medical record reflects the following:  Risk Factors: 79 yo female with PMH HTN, Atrial Fibrillation    Clinical Indicators: Presented w/cough x several weeks, SOB and leg swelling, BMP 5150  => ED  physician note Suspect patient has mild new onset CHF, last echo  , Will consult cardiology  => Chest XR with possible small bilateral pleural effusions  =>ECHO LVEF is 50%, Visually measured ejection fraction, Normal cavity size and wall size. Low normal systolic function  => D/C summary Echo with low normal systolic function. Unable to assess diastolic,  Troponin remained negative      Treatment: IV Bumex, Pending ECHO, Tele monitoring,  Cardiology consult, BB increased    Thank your time,  Unknown Elkin LUIS, [facility]  [phone]  Options provided:  -- Acute on Chronic Systolic CHF/HFrEF  -- Acute on Chronic Diastolic CHF/HFpEF  -- Acute on Chronic Systolic and Diastolic CHF  -- Acute Systolic CHF/HFrEF  -- Acute Diastolic CHF/HFpEF  -- Acute Systolic and Diastolic CHF  -- Chronic Systolic CHF/HFrEF  -- Chronic Diastolic CHF/HFpEF  -- Chronic Systolic and Diastolic CHF  -- Other - I will add my own diagnosis  -- Disagree - Not applicable / Not valid  -- Disagree - Clinically unable to determine / Unknown  -- Refer to Clinical Documentation Reviewer    PROVIDER RESPONSE TEXT:    This patient is in diastolic CHF/HFpEF exacerbation.     Query created by: Erin Yañez on 10/6/2020 11:17 AM      Electronically signed by:  Christa Brown DO 10/11/2020 2:35 PM

## 2020-11-18 ENCOUNTER — HOSPITAL ENCOUNTER (EMERGENCY)
Age: 85
Discharge: HOME | End: 2020-11-18
Attending: EMERGENCY MEDICINE
Payer: MEDICARE

## 2020-11-18 ENCOUNTER — APPOINTMENT (OUTPATIENT)
Dept: GENERAL RADIOLOGY | Age: 85
End: 2020-11-18
Attending: EMERGENCY MEDICINE
Payer: MEDICARE

## 2020-11-18 VITALS
DIASTOLIC BLOOD PRESSURE: 82 MMHG | TEMPERATURE: 98 F | HEART RATE: 78 BPM | OXYGEN SATURATION: 96 % | SYSTOLIC BLOOD PRESSURE: 125 MMHG | RESPIRATION RATE: 18 BRPM

## 2020-11-18 DIAGNOSIS — R05.9 COUGH: ICD-10-CM

## 2020-11-18 DIAGNOSIS — I50.9 CHRONIC CONGESTIVE HEART FAILURE, UNSPECIFIED HEART FAILURE TYPE (HCC): Primary | ICD-10-CM

## 2020-11-18 LAB
ALBUMIN SERPL-MCNC: 3 G/DL (ref 3.4–5)
ALBUMIN/GLOB SERPL: 0.8 {RATIO} (ref 0.8–1.7)
ALP SERPL-CCNC: 88 U/L (ref 45–117)
ALT SERPL-CCNC: 15 U/L (ref 13–56)
ANION GAP SERPL CALC-SCNC: 4 MMOL/L (ref 3–18)
AST SERPL-CCNC: 17 U/L (ref 10–38)
BASOPHILS # BLD: 0 K/UL (ref 0–0.1)
BASOPHILS NFR BLD: 0 % (ref 0–2)
BILIRUB SERPL-MCNC: 0.7 MG/DL (ref 0.2–1)
BNP SERPL-MCNC: 4359 PG/ML (ref 0–1800)
BUN SERPL-MCNC: 25 MG/DL (ref 7–18)
BUN/CREAT SERPL: 19 (ref 12–20)
CALCIUM SERPL-MCNC: 8.6 MG/DL (ref 8.5–10.1)
CHLORIDE SERPL-SCNC: 105 MMOL/L (ref 100–111)
CO2 SERPL-SCNC: 33 MMOL/L (ref 21–32)
COVID-19 RAPID TEST, COVR: NOT DETECTED
CREAT SERPL-MCNC: 1.35 MG/DL (ref 0.6–1.3)
DIFFERENTIAL METHOD BLD: ABNORMAL
EOSINOPHIL # BLD: 0 K/UL (ref 0–0.4)
EOSINOPHIL NFR BLD: 1 % (ref 0–5)
ERYTHROCYTE [DISTWIDTH] IN BLOOD BY AUTOMATED COUNT: 13.1 % (ref 11.6–14.5)
GLOBULIN SER CALC-MCNC: 3.9 G/DL (ref 2–4)
GLUCOSE SERPL-MCNC: 144 MG/DL (ref 74–99)
HCT VFR BLD AUTO: 34.5 % (ref 35–45)
HGB BLD-MCNC: 10.7 G/DL (ref 12–16)
LYMPHOCYTES # BLD: 0.7 K/UL (ref 0.9–3.6)
LYMPHOCYTES NFR BLD: 15 % (ref 21–52)
MCH RBC QN AUTO: 30.1 PG (ref 24–34)
MCHC RBC AUTO-ENTMCNC: 31 G/DL (ref 31–37)
MCV RBC AUTO: 96.9 FL (ref 74–97)
MONOCYTES # BLD: 0.4 K/UL (ref 0.05–1.2)
MONOCYTES NFR BLD: 8 % (ref 3–10)
NEUTS SEG # BLD: 3.9 K/UL (ref 1.8–8)
NEUTS SEG NFR BLD: 76 % (ref 40–73)
PLATELET # BLD AUTO: 225 K/UL (ref 135–420)
PMV BLD AUTO: 10.5 FL (ref 9.2–11.8)
POTASSIUM SERPL-SCNC: 3.5 MMOL/L (ref 3.5–5.5)
PROT SERPL-MCNC: 6.9 G/DL (ref 6.4–8.2)
RBC # BLD AUTO: 3.56 M/UL (ref 4.2–5.3)
SODIUM SERPL-SCNC: 142 MMOL/L (ref 136–145)
SOURCE, COVRS: NORMAL
SPECIMEN TYPE, XMCV1T: NORMAL
TROPONIN I SERPL-MCNC: <0.02 NG/ML (ref 0–0.04)
WBC # BLD AUTO: 5 K/UL (ref 4.6–13.2)

## 2020-11-18 PROCEDURE — 93005 ELECTROCARDIOGRAM TRACING: CPT

## 2020-11-18 PROCEDURE — 85025 COMPLETE CBC W/AUTO DIFF WBC: CPT

## 2020-11-18 PROCEDURE — 84484 ASSAY OF TROPONIN QUANT: CPT

## 2020-11-18 PROCEDURE — 83880 ASSAY OF NATRIURETIC PEPTIDE: CPT

## 2020-11-18 PROCEDURE — 99284 EMERGENCY DEPT VISIT MOD MDM: CPT

## 2020-11-18 PROCEDURE — 74011000250 HC RX REV CODE- 250: Performed by: EMERGENCY MEDICINE

## 2020-11-18 PROCEDURE — 87635 SARS-COV-2 COVID-19 AMP PRB: CPT

## 2020-11-18 PROCEDURE — 94640 AIRWAY INHALATION TREATMENT: CPT

## 2020-11-18 PROCEDURE — 71045 X-RAY EXAM CHEST 1 VIEW: CPT

## 2020-11-18 PROCEDURE — 74011250637 HC RX REV CODE- 250/637: Performed by: EMERGENCY MEDICINE

## 2020-11-18 PROCEDURE — 80053 COMPREHEN METABOLIC PANEL: CPT

## 2020-11-18 RX ORDER — DOXYCYCLINE 100 MG/1
100 CAPSULE ORAL
Status: COMPLETED | OUTPATIENT
Start: 2020-11-18 | End: 2020-11-18

## 2020-11-18 RX ORDER — IPRATROPIUM BROMIDE AND ALBUTEROL SULFATE 2.5; .5 MG/3ML; MG/3ML
3 SOLUTION RESPIRATORY (INHALATION)
Status: COMPLETED | OUTPATIENT
Start: 2020-11-18 | End: 2020-11-18

## 2020-11-18 RX ORDER — GUAIFENESIN 600 MG/1
600 TABLET, EXTENDED RELEASE ORAL 2 TIMES DAILY
Qty: 14 TAB | Refills: 0 | Status: SHIPPED | OUTPATIENT
Start: 2020-11-18 | End: 2020-11-25

## 2020-11-18 RX ORDER — DOXYCYCLINE 100 MG/1
100 CAPSULE ORAL 2 TIMES DAILY
Qty: 14 CAP | Refills: 0 | Status: SHIPPED | OUTPATIENT
Start: 2020-11-18 | End: 2020-11-25

## 2020-11-18 RX ADMIN — DOXYCYCLINE 100 MG: 100 CAPSULE ORAL at 13:48

## 2020-11-18 RX ADMIN — IPRATROPIUM BROMIDE AND ALBUTEROL SULFATE 3 ML: .5; 3 SOLUTION RESPIRATORY (INHALATION) at 12:49

## 2020-11-18 NOTE — ED NOTES
Spoke with Yakelin from [facility] and she is calling patient's caregivers to see if they can pick her up.

## 2020-11-18 NOTE — ED PROVIDER NOTES
EMERGENCY DEPARTMENT HISTORY AND PHYSICAL EXAM    12:56 PM      Date: 11/18/2020  Patient Name: Chinedu Godinez    History of Presenting Illness     Chief Complaint   Patient presents with    Chest Congestion    Cough         History Provided By: Patient      Additional History (Context): Chinedu Godinez is a 80 y.o. female who presents with increased cough. States this is been going on for \"quite a few days\" where she feels like there is some tightness and chest congestion she is having a nonproductive cough she denies any wheezes she denies any lower extremity edema fevers or chills nausea or vomiting. Patient states she has had Covid testing x2 in the past the last was approximately 1 month ago both of which were negative. Denies any tobacco alcohol or recreational drug use she is coming from home. PCP: Shahrzad Damon MD      Current Facility-Administered Medications   Medication Dose Route Frequency Provider Last Rate Last Dose    doxycycline (MONODOX) capsule 100 mg  100 mg Oral NOW Anabel Reardon MD         Current Outpatient Medications   Medication Sig Dispense Refill    doxycycline (MONODOX) 100 mg capsule Take 1 Cap by mouth two (2) times a day for 7 days. 14 Cap 0    guaiFENesin ER (Mucinex) 600 mg ER tablet Take 1 Tab by mouth two (2) times a day for 7 days. 14 Tab 0    bumetanide (BUMEX) 1 mg tablet Take 1 Tab by mouth daily. 30 Tab 0    carvediloL (COREG) 25 mg tablet Take 1 Tab by mouth two (2) times daily (with meals). 60 Tab 0    lisinopriL (PRINIVIL, ZESTRIL) 5 mg tablet Take 1 Tab by mouth daily. 30 Tab 0    apixaban (ELIQUIS) 2.5 mg tablet Take 2.5 mg by mouth two (2) times a day.  ergocalciferol, vitamin D2, (DRISDOL PO) Take 1.25 mg by mouth Once every 2 weeks.  polyethylene glycol (MIRALAX) 17 gram packet Take 1 Packet by mouth two (2) times a day.  30 Packet 0    yiupelbzy-Fhvssrft-Jcjry-W.Pet (PREPARATION H MAXIMUM STRENGTH) 0.25-1 % rectal cream Apply 1 Tube to affected area as needed for Hemorrhoids. 1 Tube 0    ergocalciferol (ERGOCALCIFEROL) 50,000 unit capsule Take 50,000 Units by mouth Once every 2 weeks.  nitroglycerin (NITROSTAT) 0.4 mg SL tablet 1 Tab by SubLINGual route every five (5) minutes as needed for Chest Pain. (Patient taking differently: 1 Tab by SubLINGual route every five (5) minutes as needed for Chest Pain. 3 doses then call 911) 30 Tab 2    acetaminophen (TYLENOL EXTRA STRENGTH) 500 mg tablet Take 2 Tabs by mouth every six (6) hours as needed for Pain. 50 Tab 0    atorvastatin (LIPITOR) 40 mg tablet Take 1 Tab by mouth nightly. 30 Tab 0    pantoprazole (PROTONIX) 40 mg tablet Take 40 mg by mouth daily. Indications: GASTROESOPHAGEAL REFLUX         Past History     Past Medical History:  Past Medical History:   Diagnosis Date    A-fib ([facility] [address]     Coronary artery disease     Degenerative spine disease     Dyslipidemia     Hypertension     Kidney stone     Osteoarthritis     [facility]     Balloon angioplasty without coronary stent       Past Surgical History:  Past Surgical History:   Procedure Laterality Date    HX APPENDECTOMY      HX HYSTERECTOMY         Family History:  Family History   Family history unknown: Yes       Social History:  Social History     Tobacco Use    Smoking status: Never Smoker    Smokeless tobacco: Never Used   Substance Use Topics    Alcohol use: No    Drug use: No       Allergies: Allergies   Allergen Reactions    Demerol [Meperidine] Other (comments)     Oral pain     Erythromycin Other (comments)    Nitrofurantoin Other (comments)     Mouth pain     Pcn [Penicillins] Other (comments)     Oral pain     Sulfa (Sulfonamide Antibiotics) Other (comments)     Oral pain          Review of Systems       Review of Systems   Constitutional: Positive for activity change and appetite change. Negative for chills, diaphoresis, fatigue and fever.    HENT: Positive for congestion and rhinorrhea. Eyes: Negative for visual disturbance. Respiratory: Positive for cough, chest tightness and shortness of breath. Cardiovascular: Negative for chest pain, palpitations and leg swelling. Gastrointestinal: Negative for abdominal pain, diarrhea, nausea and vomiting. Genitourinary: Negative for dysuria and hematuria. Musculoskeletal: Negative for back pain. Skin: Negative for rash. Neurological: Negative for dizziness, syncope, weakness and headaches. Hematological: Negative. Psychiatric/Behavioral: Negative. All other systems reviewed and are negative. Physical Exam     Visit Vitals  BP 99/73 (BP 1 Location: Left arm, BP Patient Position: At rest)   Pulse 70   Temp 98 °F (36.7 °C)   Resp 18   SpO2 97%       Physical Exam  Vitals signs and nursing note reviewed. Constitutional:       General: She is not in acute distress. Appearance: She is well-developed. HENT:      Head: Normocephalic and atraumatic. Eyes:      General: No scleral icterus. Conjunctiva/sclera: Conjunctivae normal.      Pupils: Pupils are equal, round, and reactive to light. Neck:      Musculoskeletal: Normal range of motion and neck supple. Cardiovascular:      Rate and Rhythm: Normal rate. Rhythm irregular. Heart sounds: Normal heart sounds. No murmur. Pulmonary:      Effort: Pulmonary effort is normal. No respiratory distress. Breath sounds: Wheezing present. Comments: Minimal expiratory wheeze  Abdominal:      General: Bowel sounds are normal. There is no distension. Palpations: Abdomen is soft. Tenderness: There is no abdominal tenderness. Musculoskeletal:      Right lower leg: No edema. Left lower leg: No edema. Lymphadenopathy:      Cervical: No cervical adenopathy. Skin:     General: Skin is warm and dry. Findings: No rash. Neurological:      Mental Status: She is alert and oriented to person, place, and time.       Coordination: Coordination normal.   Psychiatric:         Behavior: Behavior normal.           Diagnostic Study Results     Labs -  Recent Results (from the past 12 hour(s))   EKG, 12 LEAD, INITIAL    Collection Time: 11/18/20 12:43 PM   Result Value Ref Range    Ventricular Rate 71 BPM    QRS Duration 88 ms    Q-T Interval 386 ms    QTC Calculation (Bezet) 419 ms    Calculated R Axis 45 degrees    Calculated T Axis -47 degrees    Diagnosis       Atrial fibrillation  Nonspecific T wave abnormality  Abnormal ECG  When compared with ECG of 10-SEP-2020 17:33,  Nonspecific T wave abnormality, worse in Inferior leads     SARS-COV-2    Collection Time: 11/18/20 12:48 PM   Result Value Ref Range    Specimen source Nasopharyngeal      COVID-19 rapid test Not detected NOTD      Specimen type NP Swab     CBC WITH AUTOMATED DIFF    Collection Time: 11/18/20 12:49 PM   Result Value Ref Range    WBC 5.0 4.6 - 13.2 K/uL    RBC 3.56 (L) 4.20 - 5.30 M/uL    HGB 10.7 (L) 12.0 - 16.0 g/dL    HCT 34.5 (L) 35.0 - 45.0 %    MCV 96.9 74.0 - 97.0 FL    MCH 30.1 24.0 - 34.0 PG    MCHC 31.0 31.0 - 37.0 g/dL    RDW 13.1 11.6 - 14.5 %    PLATELET 077 919 - 064 K/uL    MPV 10.5 9.2 - 11.8 FL    NEUTROPHILS 76 (H) 40 - 73 %    LYMPHOCYTES 15 (L) 21 - 52 %    MONOCYTES 8 3 - 10 %    EOSINOPHILS 1 0 - 5 %    BASOPHILS 0 0 - 2 %    ABS. NEUTROPHILS 3.9 1.8 - 8.0 K/UL    ABS. LYMPHOCYTES 0.7 (L) 0.9 - 3.6 K/UL    ABS. MONOCYTES 0.4 0.05 - 1.2 K/UL    ABS. EOSINOPHILS 0.0 0.0 - 0.4 K/UL    ABS.  BASOPHILS 0.0 0.0 - 0.1 K/UL    DF AUTOMATED     METABOLIC PANEL, COMPREHENSIVE    Collection Time: 11/18/20 12:49 PM   Result Value Ref Range    Sodium 142 136 - 145 mmol/L    Potassium 3.5 3.5 - 5.5 mmol/L    Chloride 105 100 - 111 mmol/L    CO2 33 (H) 21 - 32 mmol/L    Anion gap 4 3.0 - 18 mmol/L    Glucose 144 (H) 74 - 99 mg/dL    BUN 25 (H) 7.0 - 18 MG/DL    Creatinine 1.35 (H) 0.6 - 1.3 MG/DL    BUN/Creatinine ratio 19 12 - 20      GFR est AA 44 (L) >60 ml/min/1.73m2 GFR est non-AA 36 (L) >60 ml/min/1.73m2    Calcium 8.6 8.5 - 10.1 MG/DL    Bilirubin, total 0.7 0.2 - 1.0 MG/DL    ALT (SGPT) 15 13 - 56 U/L    AST (SGOT) 17 10 - 38 U/L    Alk. phosphatase 88 45 - 117 U/L    Protein, total 6.9 6.4 - 8.2 g/dL    Albumin 3.0 (L) 3.4 - 5.0 g/dL    Globulin 3.9 2.0 - 4.0 g/dL    A-G Ratio 0.8 0.8 - 1.7     NT-PRO BNP    Collection Time: 11/18/20 12:49 PM   Result Value Ref Range    NT pro-BNP 4,359 (H) 0 - 1,800 PG/ML   TROPONIN I    Collection Time: 11/18/20 12:49 PM   Result Value Ref Range    Troponin-I, QT <0.02 0.0 - 0.045 NG/ML       Radiologic Studies -   XR CHEST PORT   Final Result   IMPRESSION:         1. Improved findings of pulmonary vascular congestion and edema without   superimposed acute radiographic abnormality. Medical Decision Making   I am the first provider for this patient. I reviewed the vital signs, available nursing notes, past medical history, past surgical history, family history and social history. Vital Signs-Reviewed the patient's vital signs.       EKG: A. fib at a rate of 71 with no acute ST-T wave changes QTC is 419 interpreted by me at 12:52 PM    Records Reviewed: Nursing Notes and Old Medical Records (Time of Review: 12:56 PM)    ED Course: Progress Notes, Reevaluation, and Consults:      Provider Notes (Medical Decision Making):   MDM  Number of Diagnoses or Management Options  Diagnosis management comments: Cough congestion patient does not look overtly toxic-year-old record she did have mild CHF during last admission will rule out here she is currently in A. fib rate controlled believed to be her baseline       Amount and/or Complexity of Data Reviewed  Clinical lab tests: ordered  Tests in the radiology section of CPT®: ordered    Risk of Complications, Morbidity, and/or Mortality  Presenting problems: high  Diagnostic procedures: moderate  Management options: moderate            Critical Care Time:       Diagnosis Clinical Impression:   1. Chronic congestive heart failure, unspecified heart failure type ([address]    2. Cough        Disposition: home     Follow-up Information     Follow up With Specialties Details Why [address] [facility] EMERGENCY DEPT Emergency Medicine  As needed, If symptoms worsen 600 [facility] 51    Chuy Grayson MD Geriatric Medicine Schedule an appointment as soon as possible for a visit for ED follow up appointment  [address]  [address] [phone]             Patient's Medications   Start Taking    DOXYCYCLINE (MONODOX) 100 MG CAPSULE    Take 1 Cap by mouth two (2) times a day for 7 days. GUAIFENESIN ER (MUCINEX) 600 MG ER TABLET    Take 1 Tab by mouth two (2) times a day for 7 days. Continue Taking    ACETAMINOPHEN (TYLENOL EXTRA STRENGTH) 500 MG TABLET    Take 2 Tabs by mouth every six (6) hours as needed for Pain. APIXABAN (ELIQUIS) 2.5 MG TABLET    Take 2.5 mg by mouth two (2) times a day. ATORVASTATIN (LIPITOR) 40 MG TABLET    Take 1 Tab by mouth nightly. BUMETANIDE (BUMEX) 1 MG TABLET    Take 1 Tab by mouth daily. CARVEDILOL (COREG) 25 MG TABLET    Take 1 Tab by mouth two (2) times daily (with meals). ERGOCALCIFEROL (ERGOCALCIFEROL) 50,000 UNIT CAPSULE    Take 50,000 Units by mouth Once every 2 weeks. ERGOCALCIFEROL, VITAMIN D2, (DRISDOL PO)    Take 1.25 mg by mouth Once every 2 weeks. LISINOPRIL (PRINIVIL, ZESTRIL) 5 MG TABLET    Take 1 Tab by mouth daily. NITROGLYCERIN (NITROSTAT) 0.4 MG SL TABLET    1 Tab by SubLINGual route every five (5) minutes as needed for Chest Pain. PANTOPRAZOLE (PROTONIX) 40 MG TABLET    Take 40 mg by mouth daily. Indications: GASTROESOPHAGEAL REFLUX    KHMUJVJXA-FACBPUEX-QZBRN-W.PET (PREPARATION H MAXIMUM STRENGTH) 0.25-1 % RECTAL CREAM    Apply 1 Tube to affected area as needed for Hemorrhoids.     POLYETHYLENE GLYCOL (MIRALAX) 17 GRAM PACKET    Take 1 Packet by mouth two (2) times a day. These Medications have changed    No medications on file   Stop Taking    No medications on file     _______________________________    Please note that this dictation was completed with [employer], the computer voice recognition software. Quite often unanticipated grammatical, syntax, homophones, and other interpretive errors are inadvertently transcribed by the computer software. Please disregard these errors. Please excuse any errors that have escaped final proofreading.

## 2020-11-18 NOTE — ED TRIAGE NOTES
Patient co nasal and chest congestion.  States she feels like she needs to cough something up but cannot get it up

## 2020-11-18 NOTE — Clinical Note
[facility] EMERGENCY DEPT 
[address] 
[address] [facility] [id number] [phone] Work/School Note Date: 11/18/2020 To Whom It May concern: 
 
Kavitha Sanchez was evaulated by the following provider(s): 
No providers found. COVID19 virus is suspected. Per the CDC guidelines we recommend home isolation until the following conditions are all met: 1. At least 10 days have passed since symptoms first appeared and 2. At least 24 hours have passed since last fever without the use of fever-reducing medications and 
3. Symptoms (e.g., cough, shortness of breath) have improved Sincerely, Maria Del Rosario Curtis MD

## 2020-11-19 LAB
CALCULATED R AXIS, ECG10: 45 DEGREES
CALCULATED T AXIS, ECG11: -47 DEGREES
DIAGNOSIS, 93000: NORMAL
Q-T INTERVAL, ECG07: 386 MS
QRS DURATION, ECG06: 88 MS
QTC CALCULATION (BEZET), ECG08: 419 MS
VENTRICULAR RATE, ECG03: 71 BPM

## 2021-01-13 ENCOUNTER — HOME HEALTH ADMISSION (OUTPATIENT)
Dept: HOME HEALTH SERVICES | Facility: HOME HEALTH | Age: 86
End: 2021-01-13
Payer: MEDICARE

## 2021-01-14 ENCOUNTER — HOME CARE VISIT (OUTPATIENT)
Dept: SCHEDULING | Facility: HOME HEALTH | Age: 86
End: 2021-01-14
Payer: MEDICARE

## 2021-01-14 VITALS
DIASTOLIC BLOOD PRESSURE: 80 MMHG | RESPIRATION RATE: 14 BRPM | HEART RATE: 92 BPM | SYSTOLIC BLOOD PRESSURE: 120 MMHG | TEMPERATURE: 97.6 F | OXYGEN SATURATION: 99 %

## 2021-01-14 PROCEDURE — A4452 WATERPROOF TAPE: HCPCS

## 2021-01-14 PROCEDURE — A6216 NON-STERILE GAUZE<=16 SQ IN: HCPCS

## 2021-01-14 PROCEDURE — G0299 HHS/HOSPICE OF RN EA 15 MIN: HCPCS

## 2021-01-14 PROCEDURE — 400013 HH SOC

## 2021-01-14 PROCEDURE — A6260 WOUND CLEANSER ANY TYPE/SIZE: HCPCS

## 2021-01-14 PROCEDURE — 400018 HH-NO PAY CLAIM PROCEDURE

## 2021-01-14 PROCEDURE — A6212 FOAM DRG <=16 SQ IN W/BORDER: HCPCS

## 2021-01-14 PROCEDURE — A6446 CONFORM BAND S W>=3" <5"/YD: HCPCS

## 2021-01-14 PROCEDURE — 3331090002 HH PPS REVENUE DEBIT

## 2021-01-14 PROCEDURE — 3331090001 HH PPS REVENUE CREDIT

## 2021-01-15 PROCEDURE — 3331090002 HH PPS REVENUE DEBIT

## 2021-01-15 PROCEDURE — 3331090001 HH PPS REVENUE CREDIT

## 2021-01-16 ENCOUNTER — HOME CARE VISIT (OUTPATIENT)
Dept: HOME HEALTH SERVICES | Facility: HOME HEALTH | Age: 86
End: 2021-01-16
Payer: MEDICARE

## 2021-01-16 PROCEDURE — 3331090002 HH PPS REVENUE DEBIT

## 2021-01-16 PROCEDURE — 3331090001 HH PPS REVENUE CREDIT

## 2021-01-17 ENCOUNTER — HOME CARE VISIT (OUTPATIENT)
Dept: SCHEDULING | Facility: HOME HEALTH | Age: 86
End: 2021-01-17
Payer: MEDICARE

## 2021-01-17 PROCEDURE — 3331090002 HH PPS REVENUE DEBIT

## 2021-01-17 PROCEDURE — 3331090001 HH PPS REVENUE CREDIT

## 2021-01-17 PROCEDURE — G0299 HHS/HOSPICE OF RN EA 15 MIN: HCPCS

## 2021-01-18 VITALS
DIASTOLIC BLOOD PRESSURE: 70 MMHG | RESPIRATION RATE: 16 BRPM | TEMPERATURE: 97.7 F | HEART RATE: 88 BPM | OXYGEN SATURATION: 97 % | SYSTOLIC BLOOD PRESSURE: 110 MMHG

## 2021-01-18 PROCEDURE — 3331090001 HH PPS REVENUE CREDIT

## 2021-01-18 PROCEDURE — 3331090002 HH PPS REVENUE DEBIT

## 2021-01-19 PROCEDURE — 3331090001 HH PPS REVENUE CREDIT

## 2021-01-19 PROCEDURE — 3331090002 HH PPS REVENUE DEBIT

## 2021-01-20 PROCEDURE — 3331090002 HH PPS REVENUE DEBIT

## 2021-01-20 PROCEDURE — 3331090001 HH PPS REVENUE CREDIT

## 2021-01-21 PROCEDURE — 3331090001 HH PPS REVENUE CREDIT

## 2021-01-21 PROCEDURE — 3331090002 HH PPS REVENUE DEBIT

## 2021-01-22 ENCOUNTER — HOME CARE VISIT (OUTPATIENT)
Dept: HOME HEALTH SERVICES | Facility: HOME HEALTH | Age: 86
End: 2021-01-22
Payer: MEDICARE

## 2021-01-22 PROCEDURE — 3331090002 HH PPS REVENUE DEBIT

## 2021-01-22 PROCEDURE — 3331090001 HH PPS REVENUE CREDIT

## 2021-01-23 PROCEDURE — 3331090002 HH PPS REVENUE DEBIT

## 2021-01-23 PROCEDURE — 3331090001 HH PPS REVENUE CREDIT

## 2021-01-24 ENCOUNTER — HOME CARE VISIT (OUTPATIENT)
Dept: SCHEDULING | Facility: HOME HEALTH | Age: 86
End: 2021-01-24
Payer: MEDICARE

## 2021-01-24 PROCEDURE — G0299 HHS/HOSPICE OF RN EA 15 MIN: HCPCS

## 2021-01-24 PROCEDURE — 3331090001 HH PPS REVENUE CREDIT

## 2021-01-24 PROCEDURE — 3331090002 HH PPS REVENUE DEBIT

## 2021-01-25 VITALS
HEART RATE: 62 BPM | DIASTOLIC BLOOD PRESSURE: 62 MMHG | SYSTOLIC BLOOD PRESSURE: 128 MMHG | TEMPERATURE: 97.8 F | RESPIRATION RATE: 18 BRPM | OXYGEN SATURATION: 96 %

## 2021-01-25 PROCEDURE — 3331090002 HH PPS REVENUE DEBIT

## 2021-01-25 PROCEDURE — 3331090001 HH PPS REVENUE CREDIT

## 2021-01-26 PROCEDURE — 3331090001 HH PPS REVENUE CREDIT

## 2021-01-26 PROCEDURE — 3331090002 HH PPS REVENUE DEBIT

## 2021-01-27 PROCEDURE — 3331090001 HH PPS REVENUE CREDIT

## 2021-01-27 PROCEDURE — 3331090002 HH PPS REVENUE DEBIT

## 2021-01-28 ENCOUNTER — HOME CARE VISIT (OUTPATIENT)
Dept: SCHEDULING | Facility: HOME HEALTH | Age: 86
End: 2021-01-28
Payer: MEDICARE

## 2021-01-28 VITALS
SYSTOLIC BLOOD PRESSURE: 130 MMHG | HEART RATE: 64 BPM | TEMPERATURE: 98.1 F | DIASTOLIC BLOOD PRESSURE: 60 MMHG | OXYGEN SATURATION: 97 % | RESPIRATION RATE: 18 BRPM

## 2021-01-28 PROCEDURE — G0300 HHS/HOSPICE OF LPN EA 15 MIN: HCPCS

## 2021-01-28 PROCEDURE — 3331090001 HH PPS REVENUE CREDIT

## 2021-01-28 PROCEDURE — 3331090002 HH PPS REVENUE DEBIT

## 2021-01-29 PROCEDURE — 3331090002 HH PPS REVENUE DEBIT

## 2021-01-29 PROCEDURE — 3331090001 HH PPS REVENUE CREDIT

## 2021-01-30 PROCEDURE — 3331090001 HH PPS REVENUE CREDIT

## 2021-01-30 PROCEDURE — 3331090002 HH PPS REVENUE DEBIT

## 2021-01-31 PROCEDURE — 3331090001 HH PPS REVENUE CREDIT

## 2021-01-31 PROCEDURE — 3331090002 HH PPS REVENUE DEBIT

## 2021-02-01 ENCOUNTER — HOME CARE VISIT (OUTPATIENT)
Dept: SCHEDULING | Facility: HOME HEALTH | Age: 86
End: 2021-02-01
Payer: MEDICARE

## 2021-02-01 VITALS
OXYGEN SATURATION: 92 % | TEMPERATURE: 97.8 F | SYSTOLIC BLOOD PRESSURE: 142 MMHG | HEART RATE: 60 BPM | RESPIRATION RATE: 14 BRPM | DIASTOLIC BLOOD PRESSURE: 64 MMHG

## 2021-02-01 PROCEDURE — G0300 HHS/HOSPICE OF LPN EA 15 MIN: HCPCS

## 2021-02-01 PROCEDURE — 3331090002 HH PPS REVENUE DEBIT

## 2021-02-01 PROCEDURE — 3331090001 HH PPS REVENUE CREDIT

## 2021-02-02 PROCEDURE — 3331090001 HH PPS REVENUE CREDIT

## 2021-02-02 PROCEDURE — 3331090002 HH PPS REVENUE DEBIT

## 2021-02-03 PROCEDURE — 3331090002 HH PPS REVENUE DEBIT

## 2021-02-03 PROCEDURE — 3331090001 HH PPS REVENUE CREDIT

## 2021-02-04 ENCOUNTER — HOME CARE VISIT (OUTPATIENT)
Dept: SCHEDULING | Facility: HOME HEALTH | Age: 86
End: 2021-02-04
Payer: MEDICARE

## 2021-02-04 VITALS
HEART RATE: 85 BPM | DIASTOLIC BLOOD PRESSURE: 80 MMHG | RESPIRATION RATE: 16 BRPM | TEMPERATURE: 97.6 F | SYSTOLIC BLOOD PRESSURE: 130 MMHG | OXYGEN SATURATION: 93 %

## 2021-02-04 PROCEDURE — G0300 HHS/HOSPICE OF LPN EA 15 MIN: HCPCS

## 2021-02-04 PROCEDURE — 3331090002 HH PPS REVENUE DEBIT

## 2021-02-04 PROCEDURE — 3331090001 HH PPS REVENUE CREDIT

## 2021-02-05 PROCEDURE — 3331090002 HH PPS REVENUE DEBIT

## 2021-02-05 PROCEDURE — 3331090001 HH PPS REVENUE CREDIT

## 2021-02-06 PROCEDURE — 3331090002 HH PPS REVENUE DEBIT

## 2021-02-06 PROCEDURE — 3331090001 HH PPS REVENUE CREDIT

## 2021-02-07 PROCEDURE — 3331090002 HH PPS REVENUE DEBIT

## 2021-02-07 PROCEDURE — 3331090001 HH PPS REVENUE CREDIT

## 2021-02-08 ENCOUNTER — HOME CARE VISIT (OUTPATIENT)
Dept: SCHEDULING | Facility: HOME HEALTH | Age: 86
End: 2021-02-08
Payer: MEDICARE

## 2021-02-08 PROCEDURE — G0300 HHS/HOSPICE OF LPN EA 15 MIN: HCPCS

## 2021-02-08 PROCEDURE — 3331090001 HH PPS REVENUE CREDIT

## 2021-02-08 PROCEDURE — 3331090002 HH PPS REVENUE DEBIT

## 2021-02-09 VITALS
OXYGEN SATURATION: 97 % | DIASTOLIC BLOOD PRESSURE: 66 MMHG | SYSTOLIC BLOOD PRESSURE: 124 MMHG | TEMPERATURE: 98.3 F | HEART RATE: 63 BPM | RESPIRATION RATE: 15 BRPM

## 2021-02-09 PROCEDURE — 3331090002 HH PPS REVENUE DEBIT

## 2021-02-09 PROCEDURE — 3331090001 HH PPS REVENUE CREDIT

## 2021-02-10 PROCEDURE — 3331090002 HH PPS REVENUE DEBIT

## 2021-02-10 PROCEDURE — 3331090001 HH PPS REVENUE CREDIT

## 2021-02-11 PROCEDURE — 3331090002 HH PPS REVENUE DEBIT

## 2021-02-11 PROCEDURE — 3331090001 HH PPS REVENUE CREDIT

## 2021-02-12 ENCOUNTER — HOME CARE VISIT (OUTPATIENT)
Dept: HOME HEALTH SERVICES | Facility: HOME HEALTH | Age: 86
End: 2021-02-12
Payer: MEDICARE

## 2021-02-12 PROCEDURE — 3331090002 HH PPS REVENUE DEBIT

## 2021-02-12 PROCEDURE — G0300 HHS/HOSPICE OF LPN EA 15 MIN: HCPCS

## 2021-02-12 PROCEDURE — 3331090001 HH PPS REVENUE CREDIT

## 2021-02-13 VITALS
OXYGEN SATURATION: 93 % | DIASTOLIC BLOOD PRESSURE: 70 MMHG | RESPIRATION RATE: 15 BRPM | TEMPERATURE: 97.7 F | HEART RATE: 95 BPM | SYSTOLIC BLOOD PRESSURE: 122 MMHG

## 2021-02-13 PROCEDURE — 400018 HH-NO PAY CLAIM PROCEDURE

## 2021-02-13 PROCEDURE — 3331090001 HH PPS REVENUE CREDIT

## 2021-02-13 PROCEDURE — 3331090002 HH PPS REVENUE DEBIT

## 2021-02-14 PROCEDURE — 3331090001 HH PPS REVENUE CREDIT

## 2021-02-14 PROCEDURE — 3331090002 HH PPS REVENUE DEBIT

## 2021-02-15 ENCOUNTER — HOME CARE VISIT (OUTPATIENT)
Dept: SCHEDULING | Facility: HOME HEALTH | Age: 86
End: 2021-02-15
Payer: MEDICARE

## 2021-02-15 VITALS
SYSTOLIC BLOOD PRESSURE: 122 MMHG | HEART RATE: 90 BPM | DIASTOLIC BLOOD PRESSURE: 70 MMHG | OXYGEN SATURATION: 96 % | TEMPERATURE: 98.6 F | RESPIRATION RATE: 16 BRPM

## 2021-02-15 PROCEDURE — 3331090002 HH PPS REVENUE DEBIT

## 2021-02-15 PROCEDURE — 3331090001 HH PPS REVENUE CREDIT

## 2021-02-15 PROCEDURE — 400013 HH SOC

## 2021-02-15 PROCEDURE — G0300 HHS/HOSPICE OF LPN EA 15 MIN: HCPCS

## 2021-02-16 PROCEDURE — 3331090002 HH PPS REVENUE DEBIT

## 2021-02-16 PROCEDURE — 3331090001 HH PPS REVENUE CREDIT

## 2021-02-17 PROCEDURE — 3331090002 HH PPS REVENUE DEBIT

## 2021-02-17 PROCEDURE — 3331090001 HH PPS REVENUE CREDIT

## 2021-02-18 ENCOUNTER — HOME CARE VISIT (OUTPATIENT)
Dept: SCHEDULING | Facility: HOME HEALTH | Age: 86
End: 2021-02-18
Payer: MEDICARE

## 2021-02-18 VITALS
HEART RATE: 68 BPM | TEMPERATURE: 98.1 F | DIASTOLIC BLOOD PRESSURE: 70 MMHG | SYSTOLIC BLOOD PRESSURE: 130 MMHG | OXYGEN SATURATION: 98 % | RESPIRATION RATE: 15 BRPM

## 2021-02-18 PROCEDURE — 3331090001 HH PPS REVENUE CREDIT

## 2021-02-18 PROCEDURE — 3331090002 HH PPS REVENUE DEBIT

## 2021-02-18 PROCEDURE — G0300 HHS/HOSPICE OF LPN EA 15 MIN: HCPCS

## 2021-02-19 PROCEDURE — 3331090001 HH PPS REVENUE CREDIT

## 2021-02-19 PROCEDURE — 3331090002 HH PPS REVENUE DEBIT

## 2021-02-20 PROCEDURE — 3331090002 HH PPS REVENUE DEBIT

## 2021-02-20 PROCEDURE — 3331090001 HH PPS REVENUE CREDIT

## 2021-02-21 PROCEDURE — 3331090001 HH PPS REVENUE CREDIT

## 2021-02-21 PROCEDURE — 3331090002 HH PPS REVENUE DEBIT

## 2021-02-22 ENCOUNTER — HOME CARE VISIT (OUTPATIENT)
Dept: SCHEDULING | Facility: HOME HEALTH | Age: 86
End: 2021-02-22
Payer: MEDICARE

## 2021-02-22 VITALS
OXYGEN SATURATION: 95 % | TEMPERATURE: 98.2 F | SYSTOLIC BLOOD PRESSURE: 112 MMHG | HEART RATE: 80 BPM | RESPIRATION RATE: 16 BRPM | DIASTOLIC BLOOD PRESSURE: 70 MMHG

## 2021-02-22 PROCEDURE — 3331090002 HH PPS REVENUE DEBIT

## 2021-02-22 PROCEDURE — 3331090001 HH PPS REVENUE CREDIT

## 2021-02-22 PROCEDURE — G0300 HHS/HOSPICE OF LPN EA 15 MIN: HCPCS

## 2021-02-23 PROCEDURE — 3331090001 HH PPS REVENUE CREDIT

## 2021-02-23 PROCEDURE — 3331090002 HH PPS REVENUE DEBIT

## 2021-02-24 PROCEDURE — 3331090002 HH PPS REVENUE DEBIT

## 2021-02-24 PROCEDURE — 3331090001 HH PPS REVENUE CREDIT

## 2021-02-25 ENCOUNTER — HOME CARE VISIT (OUTPATIENT)
Dept: SCHEDULING | Facility: HOME HEALTH | Age: 86
End: 2021-02-25
Payer: MEDICARE

## 2021-02-25 VITALS
RESPIRATION RATE: 16 BRPM | DIASTOLIC BLOOD PRESSURE: 62 MMHG | HEART RATE: 87 BPM | SYSTOLIC BLOOD PRESSURE: 112 MMHG | TEMPERATURE: 97.6 F | OXYGEN SATURATION: 95 %

## 2021-02-25 PROCEDURE — G0300 HHS/HOSPICE OF LPN EA 15 MIN: HCPCS

## 2021-02-25 PROCEDURE — 3331090002 HH PPS REVENUE DEBIT

## 2021-02-25 PROCEDURE — 3331090001 HH PPS REVENUE CREDIT

## 2021-02-26 PROCEDURE — 3331090001 HH PPS REVENUE CREDIT

## 2021-02-26 PROCEDURE — 3331090002 HH PPS REVENUE DEBIT

## 2021-02-27 PROCEDURE — 3331090001 HH PPS REVENUE CREDIT

## 2021-02-27 PROCEDURE — 3331090002 HH PPS REVENUE DEBIT

## 2021-02-28 PROCEDURE — 3331090002 HH PPS REVENUE DEBIT

## 2021-02-28 PROCEDURE — 3331090001 HH PPS REVENUE CREDIT

## 2021-03-01 ENCOUNTER — HOME CARE VISIT (OUTPATIENT)
Dept: SCHEDULING | Facility: HOME HEALTH | Age: 86
End: 2021-03-01
Payer: MEDICARE

## 2021-03-01 VITALS
TEMPERATURE: 98.4 F | RESPIRATION RATE: 15 BRPM | OXYGEN SATURATION: 94 % | HEART RATE: 67 BPM | DIASTOLIC BLOOD PRESSURE: 64 MMHG | SYSTOLIC BLOOD PRESSURE: 122 MMHG

## 2021-03-01 PROCEDURE — G0300 HHS/HOSPICE OF LPN EA 15 MIN: HCPCS

## 2021-03-01 PROCEDURE — 3331090002 HH PPS REVENUE DEBIT

## 2021-03-01 PROCEDURE — 3331090001 HH PPS REVENUE CREDIT

## 2021-03-02 PROCEDURE — 3331090001 HH PPS REVENUE CREDIT

## 2021-03-02 PROCEDURE — 3331090002 HH PPS REVENUE DEBIT

## 2021-03-03 PROCEDURE — 3331090002 HH PPS REVENUE DEBIT

## 2021-03-03 PROCEDURE — 3331090001 HH PPS REVENUE CREDIT

## 2021-03-04 ENCOUNTER — HOME CARE VISIT (OUTPATIENT)
Dept: SCHEDULING | Facility: HOME HEALTH | Age: 86
End: 2021-03-04
Payer: MEDICARE

## 2021-03-04 VITALS
SYSTOLIC BLOOD PRESSURE: 124 MMHG | RESPIRATION RATE: 16 BRPM | TEMPERATURE: 97.9 F | HEART RATE: 67 BPM | OXYGEN SATURATION: 96 % | DIASTOLIC BLOOD PRESSURE: 72 MMHG

## 2021-03-04 PROCEDURE — 3331090002 HH PPS REVENUE DEBIT

## 2021-03-04 PROCEDURE — 3331090001 HH PPS REVENUE CREDIT

## 2021-03-04 PROCEDURE — G0300 HHS/HOSPICE OF LPN EA 15 MIN: HCPCS

## 2021-03-05 PROCEDURE — 3331090002 HH PPS REVENUE DEBIT

## 2021-03-05 PROCEDURE — 3331090001 HH PPS REVENUE CREDIT

## 2021-03-06 PROCEDURE — 3331090002 HH PPS REVENUE DEBIT

## 2021-03-06 PROCEDURE — 3331090001 HH PPS REVENUE CREDIT

## 2021-03-07 PROCEDURE — 3331090002 HH PPS REVENUE DEBIT

## 2021-03-07 PROCEDURE — 3331090001 HH PPS REVENUE CREDIT

## 2021-03-08 ENCOUNTER — HOME CARE VISIT (OUTPATIENT)
Dept: SCHEDULING | Facility: HOME HEALTH | Age: 86
End: 2021-03-08
Payer: MEDICARE

## 2021-03-08 VITALS
OXYGEN SATURATION: 95 % | HEART RATE: 73 BPM | DIASTOLIC BLOOD PRESSURE: 64 MMHG | TEMPERATURE: 97.8 F | RESPIRATION RATE: 16 BRPM | SYSTOLIC BLOOD PRESSURE: 112 MMHG

## 2021-03-08 PROCEDURE — G0300 HHS/HOSPICE OF LPN EA 15 MIN: HCPCS

## 2021-03-08 PROCEDURE — 3331090001 HH PPS REVENUE CREDIT

## 2021-03-08 PROCEDURE — 3331090002 HH PPS REVENUE DEBIT

## 2021-03-09 PROCEDURE — 3331090002 HH PPS REVENUE DEBIT

## 2021-03-09 PROCEDURE — 3331090001 HH PPS REVENUE CREDIT

## 2021-03-10 PROCEDURE — 3331090001 HH PPS REVENUE CREDIT

## 2021-03-10 PROCEDURE — 3331090002 HH PPS REVENUE DEBIT

## 2021-03-11 ENCOUNTER — HOME CARE VISIT (OUTPATIENT)
Dept: SCHEDULING | Facility: HOME HEALTH | Age: 86
End: 2021-03-11
Payer: MEDICARE

## 2021-03-11 PROCEDURE — G0299 HHS/HOSPICE OF RN EA 15 MIN: HCPCS

## 2021-03-11 PROCEDURE — 3331090002 HH PPS REVENUE DEBIT

## 2021-03-11 PROCEDURE — 3331090001 HH PPS REVENUE CREDIT

## 2021-03-12 PROCEDURE — 3331090002 HH PPS REVENUE DEBIT

## 2021-03-12 PROCEDURE — 3331090001 HH PPS REVENUE CREDIT

## 2021-03-13 PROCEDURE — 3331090001 HH PPS REVENUE CREDIT

## 2021-03-13 PROCEDURE — 3331090002 HH PPS REVENUE DEBIT

## 2021-03-14 VITALS
HEART RATE: 80 BPM | RESPIRATION RATE: 16 BRPM | SYSTOLIC BLOOD PRESSURE: 120 MMHG | DIASTOLIC BLOOD PRESSURE: 70 MMHG | TEMPERATURE: 99 F | OXYGEN SATURATION: 96 %

## 2021-03-14 PROCEDURE — 3331090001 HH PPS REVENUE CREDIT

## 2021-03-14 PROCEDURE — 3331090002 HH PPS REVENUE DEBIT

## 2021-08-03 PROBLEM — I48.91 ATRIAL FIBRILLATION (HCC): Status: RESOLVED | Noted: 2020-09-10 | Resolved: 2021-08-03

## 2022-12-28 NOTE — ROUTINE PROCESS
1908 Bedside and Verbal shift change report given to Nicholas Storey RN (oncoming nurse) by Gay Del Rio RN (offgoing nurse). Report included the following information SBAR, Kardex, Intake/Output and MAR. Pt received in bed. Pt is alert, no distress noted. No complaints of pain or discomfort. Call light is within reach, bedside table in reach. Bed in low position with wheels locked. 2120 assisted pt to bedside commode to void 0715 Bedside and Verbal shift change report given to Jessika Gillette RN (oncoming nurse) by Nicholas Storey RN (offgoing nurse). Report included the following information SBAR, Kardex, Intake/Output and MAR. [facility] phone call message- general phone call:    Reason for call: pt would like to speak with a nurse.  She has a question on a up coming MRI.    Return call needed: Yes    OK to leave a message on voice mail? Yes    Primary language: English      needed? No    Call taken on December 28, 2022 at 9:44 AM by Tam Marin

## 2023-11-07 NOTE — ED NOTES
Assumed care of pt  Pt c/o lower midline back pain radiating to right, wrapping around to front  Pain described as \"spasms\"  No other symptoms
I performed a brief evaluation, including history and physical, of the patient here in triage and I have determined that pt will need further treatment and evaluation from the main side ER physician. I have placed initial orders to help in expediting patients care.      April 09, 2018 at 5:49 PM - JANETT Otto        Visit Vitals    BP (!) 201/99 (BP 1 Location: Right arm, BP Patient Position: At rest)    Pulse 77    Temp 98.1 °F (36.7 °C)    Resp 20    Ht 5' 2\" (1.575 m)    Wt 56.7 kg (125 lb)    SpO2 99%    BMI 22.86 kg/m2
Pt discharged to home via w/c and in company of friend  Discharge instructions provided via discussion and handout. Teaching to patient and friend. Verbalized understanding. No questions voiced. Discharged with 2 RX.
Pt medicated and placed on cardiac monitor
15